# Patient Record
Sex: FEMALE | Race: WHITE | Employment: UNEMPLOYED
[De-identification: names, ages, dates, MRNs, and addresses within clinical notes are randomized per-mention and may not be internally consistent; named-entity substitution may affect disease eponyms.]

---

## 2017-01-19 ENCOUNTER — OFFICE VISIT (OUTPATIENT)
Dept: FAMILY MEDICINE CLINIC | Facility: CLINIC | Age: 27
End: 2017-01-19

## 2017-01-19 VITALS
DIASTOLIC BLOOD PRESSURE: 80 MMHG | SYSTOLIC BLOOD PRESSURE: 112 MMHG | WEIGHT: 222.6 LBS | BODY MASS INDEX: 39.44 KG/M2 | HEIGHT: 63 IN | HEART RATE: 86 BPM

## 2017-01-19 DIAGNOSIS — S46.811A TRAPEZIUS MUSCLE STRAIN, RIGHT, INITIAL ENCOUNTER: Primary | ICD-10-CM

## 2017-01-19 PROCEDURE — 99214 OFFICE O/P EST MOD 30 MIN: CPT | Performed by: FAMILY MEDICINE

## 2017-01-19 RX ORDER — MELOXICAM 15 MG/1
15 TABLET ORAL DAILY
Qty: 30 TABLET | Refills: 3 | Status: SHIPPED | OUTPATIENT
Start: 2017-01-19 | End: 2017-08-09 | Stop reason: ALTCHOICE

## 2017-01-19 RX ORDER — TIZANIDINE 4 MG/1
4 TABLET ORAL 3 TIMES DAILY
Qty: 20 TABLET | Refills: 0 | Status: SHIPPED | OUTPATIENT
Start: 2017-01-19 | End: 2017-08-09 | Stop reason: ALTCHOICE

## 2017-04-13 ENCOUNTER — OFFICE VISIT (OUTPATIENT)
Dept: FAMILY MEDICINE CLINIC | Age: 27
End: 2017-04-13
Payer: MEDICARE

## 2017-04-13 VITALS
HEART RATE: 91 BPM | OXYGEN SATURATION: 98 % | SYSTOLIC BLOOD PRESSURE: 110 MMHG | DIASTOLIC BLOOD PRESSURE: 78 MMHG | BODY MASS INDEX: 40.22 KG/M2 | RESPIRATION RATE: 14 BRPM | WEIGHT: 227 LBS | HEIGHT: 63 IN

## 2017-04-13 DIAGNOSIS — L40.9 PSORIASIS OF SCALP: Primary | ICD-10-CM

## 2017-04-13 PROCEDURE — 99213 OFFICE O/P EST LOW 20 MIN: CPT | Performed by: FAMILY MEDICINE

## 2017-04-13 RX ORDER — TRIAMCINOLONE ACETONIDE 1 MG/ML
LOTION TOPICAL
Qty: 60 ML | Refills: 3 | Status: SHIPPED | OUTPATIENT
Start: 2017-04-13 | End: 2017-04-20

## 2017-04-13 RX ORDER — TRIAMCINOLONE ACETONIDE 0.15 MG/G
AEROSOL, SPRAY TOPICAL
Qty: 100 G | Refills: 3 | Status: SHIPPED | OUTPATIENT
Start: 2017-04-13 | End: 2017-08-09 | Stop reason: ALTCHOICE

## 2017-05-03 ENCOUNTER — TELEPHONE (OUTPATIENT)
Dept: FAMILY MEDICINE CLINIC | Age: 27
End: 2017-05-03

## 2017-05-03 RX ORDER — CALCIPOTRIENE 50 UG/G
CREAM TOPICAL
Qty: 1 TUBE | Refills: 3 | Status: SHIPPED | OUTPATIENT
Start: 2017-05-03 | End: 2017-08-09 | Stop reason: ALTCHOICE

## 2017-06-01 ENCOUNTER — HOSPITAL ENCOUNTER (OUTPATIENT)
Age: 27
Setting detail: SPECIMEN
Discharge: HOME | End: 2017-06-01
Payer: MEDICARE

## 2017-06-01 ENCOUNTER — OFFICE VISIT (OUTPATIENT)
Dept: OBGYN CLINIC | Age: 27
End: 2017-06-01
Payer: MEDICARE

## 2017-06-01 VITALS
HEIGHT: 63 IN | BODY MASS INDEX: 41.11 KG/M2 | HEART RATE: 66 BPM | RESPIRATION RATE: 16 BRPM | DIASTOLIC BLOOD PRESSURE: 70 MMHG | OXYGEN SATURATION: 99 % | SYSTOLIC BLOOD PRESSURE: 100 MMHG | WEIGHT: 232 LBS

## 2017-06-01 DIAGNOSIS — N92.1 BREAKTHROUGH BLEEDING ON NEXPLANON: ICD-10-CM

## 2017-06-01 DIAGNOSIS — Z87.891 FORMER SMOKER: ICD-10-CM

## 2017-06-01 DIAGNOSIS — Z20.2 POTENTIAL EXPOSURE TO STD: ICD-10-CM

## 2017-06-01 DIAGNOSIS — Z01.419 WOMEN'S ANNUAL ROUTINE GYNECOLOGICAL EXAMINATION: Primary | ICD-10-CM

## 2017-06-01 DIAGNOSIS — Z97.5 BREAKTHROUGH BLEEDING ON NEXPLANON: ICD-10-CM

## 2017-06-01 DIAGNOSIS — Z97.5 NEXPLANON IN PLACE: ICD-10-CM

## 2017-06-01 PROCEDURE — 99395 PREV VISIT EST AGE 18-39: CPT | Performed by: NURSE PRACTITIONER

## 2017-06-01 ASSESSMENT — ENCOUNTER SYMPTOMS
RESPIRATORY NEGATIVE: 1
EYES NEGATIVE: 1
GASTROINTESTINAL NEGATIVE: 1

## 2017-06-02 LAB
C TRACH DNA GENITAL QL NAA+PROBE: NEGATIVE
CYTOLOGY REPORT: NORMAL
DIRECT EXAM: NORMAL
Lab: NORMAL
N. GONORRHOEAE DNA: NEGATIVE
SPECIMEN DESCRIPTION: NORMAL
STATUS: NORMAL

## 2017-07-25 ENCOUNTER — OFFICE VISIT (OUTPATIENT)
Dept: FAMILY MEDICINE CLINIC | Age: 27
End: 2017-07-25
Payer: MEDICARE

## 2017-07-25 VITALS
HEIGHT: 63 IN | DIASTOLIC BLOOD PRESSURE: 81 MMHG | SYSTOLIC BLOOD PRESSURE: 119 MMHG | WEIGHT: 235 LBS | OXYGEN SATURATION: 98 % | BODY MASS INDEX: 41.64 KG/M2 | RESPIRATION RATE: 12 BRPM | HEART RATE: 87 BPM

## 2017-07-25 DIAGNOSIS — Z00.01 ENCOUNTER FOR WELL ADULT EXAM WITH ABNORMAL FINDINGS: Primary | ICD-10-CM

## 2017-07-25 DIAGNOSIS — M25.50 ARTHRALGIA, UNSPECIFIED JOINT: ICD-10-CM

## 2017-07-25 DIAGNOSIS — Z13.31 POSITIVE DEPRESSION SCREENING: ICD-10-CM

## 2017-07-25 PROCEDURE — G8431 POS CLIN DEPRES SCRN F/U DOC: HCPCS | Performed by: FAMILY MEDICINE

## 2017-07-25 PROCEDURE — 99395 PREV VISIT EST AGE 18-39: CPT | Performed by: FAMILY MEDICINE

## 2017-07-25 ASSESSMENT — PATIENT HEALTH QUESTIONNAIRE - PHQ9
3. TROUBLE FALLING OR STAYING ASLEEP: 2
10. IF YOU CHECKED OFF ANY PROBLEMS, HOW DIFFICULT HAVE THESE PROBLEMS MADE IT FOR YOU TO DO YOUR WORK, TAKE CARE OF THINGS AT HOME, OR GET ALONG WITH OTHER PEOPLE: 0
2. FEELING DOWN, DEPRESSED OR HOPELESS: 3
5. POOR APPETITE OR OVEREATING: 2
8. MOVING OR SPEAKING SO SLOWLY THAT OTHER PEOPLE COULD HAVE NOTICED. OR THE OPPOSITE, BEING SO FIGETY OR RESTLESS THAT YOU HAVE BEEN MOVING AROUND A LOT MORE THAN USUAL: 1
9. THOUGHTS THAT YOU WOULD BE BETTER OFF DEAD, OR OF HURTING YOURSELF: 0
SUM OF ALL RESPONSES TO PHQ QUESTIONS 1-9: 17
6. FEELING BAD ABOUT YOURSELF - OR THAT YOU ARE A FAILURE OR HAVE LET YOURSELF OR YOUR FAMILY DOWN: 3
4. FEELING TIRED OR HAVING LITTLE ENERGY: 3
SUM OF ALL RESPONSES TO PHQ9 QUESTIONS 1 & 2: 4
1. LITTLE INTEREST OR PLEASURE IN DOING THINGS: 1
7. TROUBLE CONCENTRATING ON THINGS, SUCH AS READING THE NEWSPAPER OR WATCHING TELEVISION: 2

## 2017-07-28 LAB
AVERAGE GLUCOSE: 117
HBA1C MFR BLD: 5.7 %

## 2017-08-01 DIAGNOSIS — Z00.01 ENCOUNTER FOR WELL ADULT EXAM WITH ABNORMAL FINDINGS: ICD-10-CM

## 2017-08-01 DIAGNOSIS — M25.50 ARTHRALGIA, UNSPECIFIED JOINT: ICD-10-CM

## 2017-08-01 LAB
ALBUMIN SERPL-MCNC: NORMAL G/DL
ALP BLD-CCNC: NORMAL U/L
ALT SERPL-CCNC: NORMAL U/L
ANION GAP SERPL CALCULATED.3IONS-SCNC: NORMAL MMOL/L
AST SERPL-CCNC: NORMAL U/L
BASOPHILS ABSOLUTE: NORMAL /ΜL
BASOPHILS RELATIVE PERCENT: NORMAL %
BILIRUB SERPL-MCNC: NORMAL MG/DL (ref 0.1–1.4)
BILIRUBIN, URINE: NORMAL
BLOOD, URINE: NORMAL
BUN BLDV-MCNC: NORMAL MG/DL
CALCIUM SERPL-MCNC: NORMAL MG/DL
CHLORIDE BLD-SCNC: NORMAL MMOL/L
CLARITY: NORMAL
CO2: NORMAL MMOL/L
COLOR: NORMAL
CREAT SERPL-MCNC: NORMAL MG/DL
EOSINOPHILS ABSOLUTE: NORMAL /ΜL
EOSINOPHILS RELATIVE PERCENT: NORMAL %
GFR CALCULATED: NORMAL
GLUCOSE BLD-MCNC: NORMAL MG/DL
GLUCOSE URINE: NORMAL
HCT VFR BLD CALC: NORMAL % (ref 36–46)
HEMOGLOBIN: NORMAL G/DL (ref 12–16)
KETONES, URINE: NORMAL
LEUKOCYTE ESTERASE, URINE: NORMAL
LYMPHOCYTES ABSOLUTE: NORMAL /ΜL
LYMPHOCYTES RELATIVE PERCENT: NORMAL %
MCH RBC QN AUTO: NORMAL PG
MCHC RBC AUTO-ENTMCNC: NORMAL G/DL
MCV RBC AUTO: NORMAL FL
MONOCYTES ABSOLUTE: NORMAL /ΜL
MONOCYTES RELATIVE PERCENT: NORMAL %
NEUTROPHILS ABSOLUTE: NORMAL /ΜL
NEUTROPHILS RELATIVE PERCENT: NORMAL %
NITRITE, URINE: NORMAL
PDW BLD-RTO: NORMAL %
PH UA: NORMAL (ref 4.5–8)
PLATELET # BLD: NORMAL K/ΜL
PMV BLD AUTO: NORMAL FL
POTASSIUM SERPL-SCNC: NORMAL MMOL/L
PROTEIN UA: NORMAL
RBC # BLD: NORMAL 10^6/ΜL
RHEUMATOID FACTOR: NORMAL
SODIUM BLD-SCNC: NORMAL MMOL/L
SPECIFIC GRAVITY, URINE: NORMAL
T4 FREE: NORMAL
TOTAL PROTEIN: NORMAL
TSH SERPL DL<=0.05 MIU/L-ACNC: NORMAL UIU/ML
UROBILINOGEN, URINE: NORMAL
WBC # BLD: NORMAL 10^3/ML

## 2017-08-09 ENCOUNTER — OFFICE VISIT (OUTPATIENT)
Dept: FAMILY MEDICINE CLINIC | Age: 27
End: 2017-08-09
Payer: MEDICARE

## 2017-08-09 VITALS
OXYGEN SATURATION: 95 % | HEIGHT: 63 IN | WEIGHT: 236 LBS | BODY MASS INDEX: 41.82 KG/M2 | SYSTOLIC BLOOD PRESSURE: 106 MMHG | DIASTOLIC BLOOD PRESSURE: 76 MMHG | HEART RATE: 99 BPM

## 2017-08-09 DIAGNOSIS — R73.03 PREDIABETES: Primary | ICD-10-CM

## 2017-08-09 DIAGNOSIS — R63.5 WEIGHT GAIN FINDING: ICD-10-CM

## 2017-08-09 PROCEDURE — 99213 OFFICE O/P EST LOW 20 MIN: CPT | Performed by: FAMILY MEDICINE

## 2017-10-13 ENCOUNTER — OFFICE VISIT (OUTPATIENT)
Dept: FAMILY MEDICINE CLINIC | Age: 27
End: 2017-10-13
Payer: MEDICARE

## 2017-10-13 VITALS
HEART RATE: 83 BPM | RESPIRATION RATE: 12 BRPM | WEIGHT: 234 LBS | SYSTOLIC BLOOD PRESSURE: 106 MMHG | DIASTOLIC BLOOD PRESSURE: 82 MMHG | BODY MASS INDEX: 41.46 KG/M2 | OXYGEN SATURATION: 96 %

## 2017-10-13 DIAGNOSIS — G89.29 CHRONIC MIDLINE LOW BACK PAIN WITHOUT SCIATICA: Primary | ICD-10-CM

## 2017-10-13 DIAGNOSIS — M54.50 CHRONIC MIDLINE LOW BACK PAIN WITHOUT SCIATICA: Primary | ICD-10-CM

## 2017-10-13 DIAGNOSIS — H54.7 VISION PROBLEMS: ICD-10-CM

## 2017-10-13 PROCEDURE — 99213 OFFICE O/P EST LOW 20 MIN: CPT | Performed by: FAMILY MEDICINE

## 2017-10-13 NOTE — PROGRESS NOTES
Security Number ([id number]) and Date of Birth (mm/dd/yyyy) as indicated and click Submit. You will be taken to the next sign-up page. 5. Create a [employer] ID. This will be your [employer] login ID and cannot be changed, so think of one that is secure and easy to remember. 6. Create a [employer] password. You can change your password at any time. 7. Enter your Password Reset Question and Answer. This can be used at a later time if you forget your password. 8. Enter your e-mail address. You will receive e-mail notification when new information is available in [address] 9. Click Sign Up. You can now view your medical record. Additional Information  If you have questions, please contact your physician practice where you receive care. Remember, [employer] is NOT to be used for urgent needs. For medical emergencies, dial 911.

## 2017-10-13 NOTE — PROGRESS NOTES
with sitting to long or standing. Associated signs/symptoms: no other sx. Patient also wants to know if she can have a note from me to tell the 's proved that she can have a 's license. She tells me that she had meningitis when she was 16 years old. 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 She did not follow up with any neurologist.  She cannot tell me what her that she cannot make the 's license. She tells me that she has episodes were today. She just stare. Then she does not recognize anybody anything. She cannot tell me how long the stares are. He denies any other symptoms including vision changes, sensation loss, muscle weakness, headaches, speach problems    Review of Systems   Constitutional: Negative for fever and unexpected weight change. Respiratory: Negative for cough and shortness of breath. Cardiovascular: Negative for chest pain and leg swelling. Neurological: Negative for dizziness and headaches. positive for stares. Psychiatric/Behavioral: Negative for confusion and agitation. Objective:   Physical Exam  Constitutional: VS (see above). General appearance: normal development, habitus and attention, no deformities. Eyes: normal conjunctiva and lids. CAV: RRR, no RMG. No edema lower extremities. Pulmo: CTA bilateral, no CWR. Skin: no rashes, lesions or ulcers. Musculoskeletal: normal gait. Nails: no clubbing or cyanosis. Psychiatric: alert and oriented to place, time and person. Normal mood and affect. Assessment:      1. Chronic midline low back pain without sciatica  [facility]   2. Vision problems  [facility] Avril Suresh MD       Plan:   Patient's physical therapy already. I will send to pain management. It seems that her for muscle strength is diabetes. She has always pain.   I will send the patient to neurologist if she really has a fixed gaze and maybe she has even seizures. Await urologist's recommendation. Call or return to clinic prn if these symptoms worsen or fail to improve as anticipated. I have reviewed the instructions with the patient, answering all questions to her satisfaction. Return in about 6 months (around 4/13/2018), or if symptoms worsen or fail to improve. Orders Placed This Encounter   Procedures    [facility] [address] [facility]     Referral Priority:   Routine     Referral Type:   Consult for Advice and Opinion     Referral Reason:   Specialty Services Required     Number of Visits Requested:   1    [facility] Sonia Benson MD     Referral Priority:   Routine     Referral Type:   Consult for Advice and Opinion     Referral Reason:   Specialty Services Required     Referred to Provider:   Odessa Sanchez MD     Requested Specialty:   Neurology     Number of Visits Requested:   1     No orders of the defined types were placed in this encounter.       Electronically signed by Patricia Lara MD on 10/13/2017 at 2:01 PM       (Please note that portions of this note were completed with a voice recognition program. Efforts were made to edit the dictations but occasionally words are mis-transcribed.)

## 2017-10-19 ENCOUNTER — HOSPITAL ENCOUNTER (OUTPATIENT)
Dept: PAIN MANAGEMENT | Age: 27
Discharge: HOME | End: 2017-10-19
Payer: MEDICARE

## 2017-10-19 VITALS
BODY MASS INDEX: 41.46 KG/M2 | SYSTOLIC BLOOD PRESSURE: 107 MMHG | HEIGHT: 63 IN | DIASTOLIC BLOOD PRESSURE: 62 MMHG | WEIGHT: 234 LBS | TEMPERATURE: 97.9 F | RESPIRATION RATE: 18 BRPM

## 2017-10-19 DIAGNOSIS — G89.29 CHRONIC GENERALIZED PAIN: Primary | ICD-10-CM

## 2017-10-19 DIAGNOSIS — R52 CHRONIC GENERALIZED PAIN: Primary | ICD-10-CM

## 2017-10-19 PROCEDURE — 99204 OFFICE O/P NEW MOD 45 MIN: CPT

## 2017-10-19 PROCEDURE — 99244 OFF/OP CNSLTJ NEW/EST MOD 40: CPT | Performed by: PAIN MEDICINE

## 2017-10-19 RX ORDER — GABAPENTIN 300 MG/1
300 CAPSULE ORAL 3 TIMES DAILY
Qty: 90 CAPSULE | Refills: 0 | Status: SHIPPED | OUTPATIENT
Start: 2017-10-19 | End: 2017-12-14 | Stop reason: SDUPTHER

## 2017-10-19 ASSESSMENT — ENCOUNTER SYMPTOMS
BACK PAIN: 1
EYE DISCHARGE: 0
SUSPICIOUS LESIONS: 0
BOWEL INCONTINENCE: 0
UNUSUAL HAIR DISTRIBUTION: 0
STRIDOR: 0
SPUTUM PRODUCTION: 0
HEMOPTYSIS: 0
JAUNDICE: 0

## 2017-12-14 ENCOUNTER — HOSPITAL ENCOUNTER (OUTPATIENT)
Dept: PAIN MANAGEMENT | Age: 27
Discharge: HOME | End: 2017-12-14
Payer: MEDICARE

## 2017-12-14 VITALS
TEMPERATURE: 98.4 F | DIASTOLIC BLOOD PRESSURE: 74 MMHG | HEIGHT: 63 IN | HEART RATE: 105 BPM | SYSTOLIC BLOOD PRESSURE: 125 MMHG | WEIGHT: 234 LBS | RESPIRATION RATE: 16 BRPM | BODY MASS INDEX: 41.46 KG/M2

## 2017-12-14 DIAGNOSIS — G89.29 CHRONIC MIDLINE LOW BACK PAIN WITHOUT SCIATICA: Primary | ICD-10-CM

## 2017-12-14 DIAGNOSIS — M54.50 CHRONIC MIDLINE LOW BACK PAIN WITHOUT SCIATICA: Primary | ICD-10-CM

## 2017-12-14 DIAGNOSIS — R52 CHRONIC GENERALIZED PAIN: ICD-10-CM

## 2017-12-14 DIAGNOSIS — G89.29 CHRONIC UPPER BACK PAIN: ICD-10-CM

## 2017-12-14 DIAGNOSIS — M54.9 CHRONIC UPPER BACK PAIN: ICD-10-CM

## 2017-12-14 DIAGNOSIS — G89.29 CHRONIC GENERALIZED PAIN: ICD-10-CM

## 2017-12-14 PROCEDURE — 99214 OFFICE O/P EST MOD 30 MIN: CPT

## 2017-12-14 PROCEDURE — 99214 OFFICE O/P EST MOD 30 MIN: CPT | Performed by: PAIN MEDICINE

## 2017-12-14 RX ORDER — GABAPENTIN 300 MG/1
300 CAPSULE ORAL 3 TIMES DAILY
Qty: 90 CAPSULE | Refills: 0 | Status: SHIPPED | OUTPATIENT
Start: 2017-12-14 | End: 2018-02-28 | Stop reason: SINTOL

## 2017-12-14 ASSESSMENT — ENCOUNTER SYMPTOMS
BACK PAIN: 1
BLURRED VISION: 0

## 2017-12-14 ASSESSMENT — PAIN SCALES - GENERAL: PAINLEVEL_OUTOF10: 0

## 2018-01-19 ENCOUNTER — HOSPITAL ENCOUNTER (OUTPATIENT)
Dept: PAIN MANAGEMENT | Age: 28
Discharge: HOME | End: 2018-01-19
Payer: MEDICARE

## 2018-01-19 VITALS
SYSTOLIC BLOOD PRESSURE: 117 MMHG | DIASTOLIC BLOOD PRESSURE: 70 MMHG | HEART RATE: 87 BPM | TEMPERATURE: 98.4 F | RESPIRATION RATE: 16 BRPM

## 2018-01-19 DIAGNOSIS — M54.50 CHRONIC MIDLINE LOW BACK PAIN WITHOUT SCIATICA: Chronic | ICD-10-CM

## 2018-01-19 DIAGNOSIS — G89.29 CHRONIC MIDLINE LOW BACK PAIN WITHOUT SCIATICA: Chronic | ICD-10-CM

## 2018-01-19 PROBLEM — M54.9 BACK PAIN, CHRONIC: Chronic | Status: ACTIVE | Noted: 2018-01-19

## 2018-01-19 PROCEDURE — 99214 OFFICE O/P EST MOD 30 MIN: CPT

## 2018-01-19 PROCEDURE — 99213 OFFICE O/P EST LOW 20 MIN: CPT | Performed by: NURSE PRACTITIONER

## 2018-01-19 ASSESSMENT — ENCOUNTER SYMPTOMS
BACK PAIN: 1
COUGH: 0
CONSTIPATION: 0
SHORTNESS OF BREATH: 0
BOWEL INCONTINENCE: 0

## 2018-01-19 NOTE — PROGRESS NOTES
disturbances in coordination, loss of balance, numbness and tingling. Physical Exam:  /70   Pulse 87   Temp 98.4 °F (36.9 °C) (Oral)   Resp 16     Physical Exam   Constitutional: She is oriented to person, place, and time and well-developed, well-nourished, and in no distress. HENT:   Head: Normocephalic. Eyes: EOM are normal.   Neck: Normal range of motion. Pulmonary/Chest: Effort normal.   Musculoskeletal: Normal range of motion. Neurological: She is alert and oriented to person, place, and time. Skin: Skin is warm and dry. Psychiatric: Affect normal.       Record/Diagnostics Review:    As above, I did review the imaging    Assessment:  Problem List Items Addressed This Visit     Back pain, chronic (Chronic)      Other Visit Diagnoses    None. Treatment Plan:  DISCUSSION: Treatment options discussed with patient and all questions answered to patient's satisfaction.       TREATMENT OPTIONS:   Patient has failed conservative treatment for back pain  She is unable to localize pain to one are in the back  Follow up with PCP for eval for fibromyalgia

## 2018-01-31 ENCOUNTER — OFFICE VISIT (OUTPATIENT)
Dept: FAMILY MEDICINE CLINIC | Age: 28
End: 2018-01-31
Payer: MEDICARE

## 2018-01-31 VITALS
HEIGHT: 63 IN | DIASTOLIC BLOOD PRESSURE: 79 MMHG | BODY MASS INDEX: 41.11 KG/M2 | HEART RATE: 72 BPM | WEIGHT: 232 LBS | RESPIRATION RATE: 16 BRPM | SYSTOLIC BLOOD PRESSURE: 108 MMHG | OXYGEN SATURATION: 99 %

## 2018-01-31 DIAGNOSIS — M54.9 OTHER CHRONIC BACK PAIN: Primary | ICD-10-CM

## 2018-01-31 DIAGNOSIS — G89.29 OTHER CHRONIC BACK PAIN: Primary | ICD-10-CM

## 2018-01-31 PROCEDURE — G8484 FLU IMMUNIZE NO ADMIN: HCPCS | Performed by: FAMILY MEDICINE

## 2018-01-31 PROCEDURE — 99213 OFFICE O/P EST LOW 20 MIN: CPT | Performed by: FAMILY MEDICINE

## 2018-01-31 PROCEDURE — G8417 CALC BMI ABV UP PARAM F/U: HCPCS | Performed by: FAMILY MEDICINE

## 2018-01-31 PROCEDURE — G8427 DOCREV CUR MEDS BY ELIG CLIN: HCPCS | Performed by: FAMILY MEDICINE

## 2018-01-31 PROCEDURE — 1036F TOBACCO NON-USER: CPT | Performed by: FAMILY MEDICINE

## 2018-01-31 RX ORDER — DULOXETIN HYDROCHLORIDE 30 MG/1
30 CAPSULE, DELAYED RELEASE ORAL DAILY
Qty: 30 CAPSULE | Refills: 3 | Status: SHIPPED | OUTPATIENT
Start: 2018-01-31 | End: 2018-02-19 | Stop reason: SDUPTHER

## 2018-01-31 NOTE — PROGRESS NOTES
Routine     Referral Type:   Consult for Advice and Opinion     Referral Reason:   Specialty Services Required     Referred to Provider:   Nazia Cuellar MD     Requested Specialty:   Physical Medicine and Rehab     Number of Visits Requested:   1     Orders Placed This Encounter   Medications    DULoxetine (CYMBALTA) 30 MG extended release capsule     Sig: Take 1 capsule by mouth daily     Dispense:  30 capsule     Refill:  3       Electronically signed by Kevan Valdez MD on 1/31/2018 at 11:59 AM       (Please note that portions of this note were completed with a voice recognition program. Efforts were made to edit the dictations but occasionally words are mis-transcribed.)

## 2018-02-11 DIAGNOSIS — R51.9 INTRACTABLE HEADACHE, UNSPECIFIED CHRONICITY PATTERN, UNSPECIFIED HEADACHE TYPE: ICD-10-CM

## 2018-02-12 RX ORDER — SUMATRIPTAN 50 MG/1
TABLET, FILM COATED ORAL
Qty: 9 TABLET | Refills: 3 | Status: SHIPPED | OUTPATIENT
Start: 2018-02-12 | End: 2018-04-17

## 2018-02-19 ENCOUNTER — TELEPHONE (OUTPATIENT)
Dept: FAMILY MEDICINE CLINIC | Age: 28
End: 2018-02-19

## 2018-02-19 DIAGNOSIS — G89.29 OTHER CHRONIC BACK PAIN: ICD-10-CM

## 2018-02-19 DIAGNOSIS — M54.9 OTHER CHRONIC BACK PAIN: ICD-10-CM

## 2018-02-19 RX ORDER — DULOXETIN HYDROCHLORIDE 30 MG/1
30 CAPSULE, DELAYED RELEASE ORAL 2 TIMES DAILY
Qty: 60 CAPSULE | Refills: 3 | Status: SHIPPED | OUTPATIENT
Start: 2018-02-19 | End: 2018-04-17

## 2018-02-19 RX ORDER — DULOXETIN HYDROCHLORIDE 30 MG/1
30 CAPSULE, DELAYED RELEASE ORAL 2 TIMES DAILY
Qty: 30 CAPSULE | Refills: 3 | Status: SHIPPED | OUTPATIENT
Start: 2018-02-19 | End: 2018-02-19 | Stop reason: SDUPTHER

## 2018-02-28 ENCOUNTER — INITIAL CONSULT (OUTPATIENT)
Dept: PHYSICAL MEDICINE AND REHAB | Age: 28
End: 2018-02-28
Payer: MEDICARE

## 2018-02-28 VITALS
HEIGHT: 63 IN | WEIGHT: 229 LBS | HEART RATE: 68 BPM | SYSTOLIC BLOOD PRESSURE: 117 MMHG | BODY MASS INDEX: 40.57 KG/M2 | TEMPERATURE: 98.6 F | DIASTOLIC BLOOD PRESSURE: 70 MMHG

## 2018-02-28 DIAGNOSIS — M70.61 TROCHANTERIC BURSITIS OF RIGHT HIP: ICD-10-CM

## 2018-02-28 DIAGNOSIS — G89.29 CHRONIC BILATERAL LOW BACK PAIN WITHOUT SCIATICA: Primary | Chronic | ICD-10-CM

## 2018-02-28 DIAGNOSIS — R52 DIFFUSE PAIN: ICD-10-CM

## 2018-02-28 DIAGNOSIS — M54.50 CHRONIC BILATERAL LOW BACK PAIN WITHOUT SCIATICA: Primary | Chronic | ICD-10-CM

## 2018-02-28 PROCEDURE — G8484 FLU IMMUNIZE NO ADMIN: HCPCS | Performed by: PHYSICAL MEDICINE & REHABILITATION

## 2018-02-28 PROCEDURE — G8417 CALC BMI ABV UP PARAM F/U: HCPCS | Performed by: PHYSICAL MEDICINE & REHABILITATION

## 2018-02-28 PROCEDURE — G8427 DOCREV CUR MEDS BY ELIG CLIN: HCPCS | Performed by: PHYSICAL MEDICINE & REHABILITATION

## 2018-02-28 PROCEDURE — 99244 OFF/OP CNSLTJ NEW/EST MOD 40: CPT | Performed by: PHYSICAL MEDICINE & REHABILITATION

## 2018-02-28 RX ORDER — NAPROXEN 500 MG/1
500 TABLET ORAL 2 TIMES DAILY WITH MEALS
Qty: 60 TABLET | Refills: 1 | Status: SHIPPED | OUTPATIENT
Start: 2018-02-28 | End: 2018-04-17

## 2018-02-28 ASSESSMENT — ENCOUNTER SYMPTOMS
BACK PAIN: 1
BOWEL INCONTINENCE: 0

## 2018-02-28 NOTE — PROGRESS NOTES
[facility] PHYSICIANS  [facility] PHYSICAL MEDICINE AND REHABILITATION  [address] [id number]  Dept: [phone]  Dept Fax: [phone]    New Patient Consultation Note    Kennedy Padron, 24 y.o., female, is c/o of Back Pain (Consult: Chronic Back Pain per Dr. Patrick Márquez)   and request for evaluation of back pain by Temo Islas MD.    HPI:     Back Pain   This is a chronic (7 years ) problem. The current episode started more than 1 year ago. The problem occurs intermittently. The problem is unchanged (for the last 6-7 months). The pain is present in the lumbar spine (right side only). The quality of the pain is described as shooting and stabbing. Radiates to: can radiate to either foot at times. Can radiate to either elbow or wrist. The pain is at a severity of 4/10. The pain is mild. The pain is the same all the time. The symptoms are aggravated by bending, lying down, twisting and standing. Stiffness is present: sometimes she is stiff could be any time of day. ~2 x/ month. Pertinent negatives include no bladder incontinence, bowel incontinence, numbness, tingling or weakness. She has tried ice, heat and NSAIDs for the symptoms. The treatment provided no relief. The patient states that her pain varies. For the last 6-7 months she has had mid to low back pain everyday. She only has pain in the peripherary if she sits or stands for too long. This lasts for 1-2 hours.       Past Medical History:   Diagnosis Date    ADHD     Anxiety     Brain injury ([address]     Depression     Hearing loss     Learning disability     Migraine headache       Past Surgical History:   Procedure Laterality Date    TONSILLECTOMY      WISDOM TOOTH EXTRACTION       Family History   Problem Relation Age of Onset    Diabetes Mother     Heart Disease Father     Diabetes Father     Diabetes Maternal Grandmother     Diabetes Maternal Grandfather     Diabetes Paternal Grandmother     Diabetes Paternal Grandfather     Other Sister      sepsis in blood stream and pneumonia    Alcohol Abuse Brother     Drug Abuse Brother     Alcohol Abuse Brother     Drug Abuse Brother      Social History   Substance Use Topics    Smoking status: Former Smoker     Quit date: 1/1/2016    Smokeless tobacco: Never Used    Alcohol use No      Occupation: does not work    Current Outpatient Prescriptions   Medication Sig Dispense Refill    naproxen (NAPROSYN) 500 MG tablet Take 1 tablet by mouth 2 times daily (with meals) 60 tablet 1    DULoxetine (CYMBALTA) 30 MG extended release capsule Take 1 capsule by mouth 2 times daily 60 capsule 3    amitriptyline (ELAVIL) 50 MG tablet Take 50 mg by mouth nightly       etonogestrel (NEXPLANON) 68 MG implant Inject 68 mg into the skin once Indications: old one removed and new one inserted 8/14/15      SUMAtriptan (IMITREX) 50 MG tablet TAKE ONE TABLET BY MOUTH ONCE AS NEEDED FOR MIGRAINE 9 tablet 3     No current facility-administered medications for this visit. Allergies   Allergen Reactions    Cat Hair Extract     Dust Mite Extract     Molds & Smuts        Subjective:      Review of Systems   Gastrointestinal: Negative for bowel incontinence. Genitourinary: Negative for bladder incontinence. Musculoskeletal: Positive for back pain. Neurological: Negative for tingling, weakness and numbness. Constitutional: Negative for fever, chills and unexpected weight change. HENT: Negative for trouble swallowing. Respiratory: Negative for cough and shortness of breath. Cardiovascular: Negative for chest pain. Endocrine: Negative for polyuria. Genitourinary: Negative for dysuria, urgency, frequency and difficulty urinating. Musculoskeletal: Negative for back pain and arthralgias. Neurological: Negative for headaches. Objective:     Physical Exam   Constitutional: She appears well-developed. Neck: Normal range of motion.    Musculoskeletal:        Cervical measuring 2.7 cm in maximum diameter. This likely represents a dominant follicle. Assessment:      1. Chronic bilateral low back pain without sciatica  [facility] Antonio Lemos MD   2. Trochanteric bursitis of right hip  naproxen (NAPROSYN) 500 MG tablet   3. Diffuse pain  [facility] Antonio Lemos MD          Plan:      1. The patient is here today with diffuse pain in the back. She reports a history of diffuse pain in the extremities off and on for years. The patient had an essentially normal exam today. She reports that she just finished physical therapy in December 2017 with minimal improvement. 2.  The patient was started on treatment for fibromyalgia and is currently taking cymbalta with fair results per patient report. He reports some fatigue , but does not feel pain on both sides of the body or above and below the waist currently. The patient had only 4-5 of 18 painful points on exam (old criteria and inconsistent exam). I will recommend evaluation and treatment by a rheumatologist for fibromyalgia.  3 Greater trochanteric bursitis- we'll start Naproxen BID for 1 month and provided the patient with a HEP. Orders Placed This Encounter   Procedures    [facility] Antonio Lemos MD     Referral Priority:   Routine     Referral Type:   Consult for Advice and Opinion     Referral Reason:   Specialty Services Required     Referred to Provider:   Roney Rockwell MD     Requested Specialty:   Rheumatology     Number of Visits Requested:   1     Orders Placed This Encounter   Medications    naproxen (NAPROSYN) 500 MG tablet     Sig: Take 1 tablet by mouth 2 times daily (with meals)     Dispense:  60 tablet     Refill:  1     Return in about 1 month (around 3/28/2018) for reevaluate hip pain.        Electronically signed by Eve Ibarra MD on 2/28/2018 at 12:18 PM.     Please note that this chart was

## 2018-03-12 DIAGNOSIS — G89.29 OTHER CHRONIC BACK PAIN: ICD-10-CM

## 2018-03-12 DIAGNOSIS — M54.9 OTHER CHRONIC BACK PAIN: ICD-10-CM

## 2018-03-12 RX ORDER — DULOXETIN HYDROCHLORIDE 30 MG/1
30 CAPSULE, DELAYED RELEASE ORAL 2 TIMES DAILY
Qty: 60 CAPSULE | Refills: 3 | Status: CANCELLED | OUTPATIENT
Start: 2018-03-12

## 2018-03-27 ENCOUNTER — TELEPHONE (OUTPATIENT)
Dept: FAMILY MEDICINE CLINIC | Age: 28
End: 2018-03-27

## 2018-04-06 ENCOUNTER — CLINICAL DOCUMENTATION (OUTPATIENT)
Dept: PHYSICAL MEDICINE AND REHAB | Age: 28
End: 2018-04-06

## 2018-04-17 ENCOUNTER — OFFICE VISIT (OUTPATIENT)
Dept: PHYSICAL MEDICINE AND REHAB | Age: 28
End: 2018-04-17
Payer: MEDICARE

## 2018-04-17 VITALS
WEIGHT: 239.2 LBS | DIASTOLIC BLOOD PRESSURE: 72 MMHG | BODY MASS INDEX: 44.02 KG/M2 | TEMPERATURE: 99 F | SYSTOLIC BLOOD PRESSURE: 102 MMHG | HEART RATE: 93 BPM | HEIGHT: 62 IN

## 2018-04-17 DIAGNOSIS — M54.50 CHRONIC BILATERAL LOW BACK PAIN WITHOUT SCIATICA: Primary | Chronic | ICD-10-CM

## 2018-04-17 DIAGNOSIS — G89.29 CHRONIC BILATERAL LOW BACK PAIN WITHOUT SCIATICA: Primary | Chronic | ICD-10-CM

## 2018-04-17 PROCEDURE — 1036F TOBACCO NON-USER: CPT | Performed by: PHYSICAL MEDICINE & REHABILITATION

## 2018-04-17 PROCEDURE — G8427 DOCREV CUR MEDS BY ELIG CLIN: HCPCS | Performed by: PHYSICAL MEDICINE & REHABILITATION

## 2018-04-17 PROCEDURE — G8417 CALC BMI ABV UP PARAM F/U: HCPCS | Performed by: PHYSICAL MEDICINE & REHABILITATION

## 2018-04-17 PROCEDURE — 99214 OFFICE O/P EST MOD 30 MIN: CPT | Performed by: PHYSICAL MEDICINE & REHABILITATION

## 2018-04-19 ENCOUNTER — TELEPHONE (OUTPATIENT)
Dept: PHYSICAL MEDICINE AND REHAB | Age: 28
End: 2018-04-19

## 2018-04-19 DIAGNOSIS — M54.50 CHRONIC BILATERAL LOW BACK PAIN WITHOUT SCIATICA: Primary | Chronic | ICD-10-CM

## 2018-04-19 DIAGNOSIS — G89.29 CHRONIC BILATERAL LOW BACK PAIN WITHOUT SCIATICA: Primary | Chronic | ICD-10-CM

## 2018-08-14 ENCOUNTER — PROCEDURE VISIT (OUTPATIENT)
Dept: OBGYN CLINIC | Age: 28
End: 2018-08-14
Payer: MEDICARE

## 2018-08-14 VITALS
SYSTOLIC BLOOD PRESSURE: 110 MMHG | DIASTOLIC BLOOD PRESSURE: 76 MMHG | HEIGHT: 62 IN | WEIGHT: 233 LBS | BODY MASS INDEX: 42.88 KG/M2

## 2018-08-14 DIAGNOSIS — Z30.46 ENCOUNTER FOR NEXPLANON REMOVAL: Primary | ICD-10-CM

## 2018-08-14 DIAGNOSIS — Z30.013 ENCOUNTER FOR INITIAL PRESCRIPTION OF INJECTABLE CONTRACEPTIVE: ICD-10-CM

## 2018-08-14 DIAGNOSIS — N92.6 IRREGULAR BLEEDING: ICD-10-CM

## 2018-08-14 PROCEDURE — 96372 THER/PROPH/DIAG INJ SC/IM: CPT | Performed by: ADVANCED PRACTICE MIDWIFE

## 2018-08-14 PROCEDURE — 11982 REMOVE DRUG IMPLANT DEVICE: CPT | Performed by: ADVANCED PRACTICE MIDWIFE

## 2018-08-14 RX ORDER — MEDROXYPROGESTERONE ACETATE 150 MG/ML
150 INJECTION, SUSPENSION INTRAMUSCULAR ONCE
Qty: 1 ML | Refills: 3 | Status: SHIPPED | OUTPATIENT
Start: 2018-08-14 | End: 2019-06-17 | Stop reason: SDUPTHER

## 2018-08-15 ENCOUNTER — NURSE ONLY (OUTPATIENT)
Dept: OBGYN CLINIC | Age: 28
End: 2018-08-15
Payer: MEDICARE

## 2018-08-15 DIAGNOSIS — Z30.013 ENCOUNTER FOR INITIAL PRESCRIPTION OF INJECTABLE CONTRACEPTIVE: Primary | ICD-10-CM

## 2018-08-15 PROBLEM — Z30.46 ENCOUNTER FOR NEXPLANON REMOVAL: Status: ACTIVE | Noted: 2018-08-15

## 2018-08-15 PROCEDURE — 96372 THER/PROPH/DIAG INJ SC/IM: CPT | Performed by: ADVANCED PRACTICE MIDWIFE

## 2018-08-15 RX ORDER — MEDROXYPROGESTERONE ACETATE 150 MG/ML
150 INJECTION, SUSPENSION INTRAMUSCULAR ONCE
Status: COMPLETED | OUTPATIENT
Start: 2018-08-15 | End: 2018-08-15

## 2018-08-15 RX ORDER — NORTRIPTYLINE HYDROCHLORIDE 25 MG/1
CAPSULE ORAL
COMMUNITY
Start: 2018-08-01 | End: 2019-05-24 | Stop reason: ALTCHOICE

## 2018-08-15 RX ADMIN — MEDROXYPROGESTERONE ACETATE 150 MG: 150 INJECTION, SUSPENSION INTRAMUSCULAR at 10:50

## 2018-08-15 NOTE — PROGRESS NOTES
Maira Villalba  1990    CC: Here for removal of Nexplanon after 3 years. States has gained a lot of weight since placement and Nexplanon is very deep. Difficulty palpating device, but eventually located and marked. Wanting to start DMPA. Pt consents to proceed and consent form has been signed. PROCEDURE:  Nexplanon Removal  Left upper inner arm cleansed with Betadine swabsticks. 8 cc of 1% Lidocaine with Epinephrine injected  Incision approximately 1 inch was made proximal to device. Device removed after some difficulty locating it. Device was confirmed intact. 3 interrupted sutures placed. Band aid was placed over removal site and arm was pressure wrapped. Patient tolerated procedure well. Bleeding, infection and pain precautions discussed.   RTO in 2 week for Nurse Visit - Suture removal

## 2018-08-21 ENCOUNTER — TELEPHONE (OUTPATIENT)
Dept: OBGYN CLINIC | Age: 28
End: 2018-08-21

## 2018-08-21 NOTE — TELEPHONE ENCOUNTER
Patient called today and states she would like to take out her own sutures. She states the wound is healed and she does not want to come in for a 5 minute appointment to have them taken out. I told her we do not recommend taking out the sutures at home. I stated we would like to see the area and remove them at the office. I offered her an appointment today and she declined and said she would call back.

## 2018-08-24 ENCOUNTER — OFFICE VISIT (OUTPATIENT)
Dept: OBGYN CLINIC | Age: 28
End: 2018-08-24
Payer: MEDICARE

## 2018-08-24 ENCOUNTER — HOSPITAL ENCOUNTER (OUTPATIENT)
Age: 28
Setting detail: SPECIMEN
Discharge: HOME | End: 2018-08-24
Payer: MEDICARE

## 2018-08-24 VITALS
SYSTOLIC BLOOD PRESSURE: 104 MMHG | HEART RATE: 68 BPM | BODY MASS INDEX: 42.88 KG/M2 | DIASTOLIC BLOOD PRESSURE: 78 MMHG | HEIGHT: 62 IN | WEIGHT: 233 LBS

## 2018-08-24 DIAGNOSIS — Z01.419 ENCOUNTER FOR WELL WOMAN EXAM: ICD-10-CM

## 2018-08-24 PROCEDURE — 99395 PREV VISIT EST AGE 18-39: CPT | Performed by: ADVANCED PRACTICE MIDWIFE

## 2018-08-24 NOTE — PROGRESS NOTES
adenopathy    Physical Exam:     Constitutional:   Blood pressure 104/78, pulse 68, height 5' 2\" (1.575 m), weight 233 lb (105.7 kg), last menstrual period 08/15/2018, not currently breastfeeding. General Appearance: This is a well-developed, well-nourished and well-groomed female    Skin:  Inspection of the skin revealed no rashes or lesions. Incision from Nexplanon removal is C/D/I. Neck and EENT:  The neck was supple with full range of motion and no masses. The thyroid was not enlarged and had no masses. Normal external ears are present  Nares are patent    Respiratory: The lungs were clear to auscultation bilaterally. There were no rales, rhonchi or wheezes. There was good respiratory effort. Cardiovascular: The heart was in a regular rhythm and rate was normal.  No murmur or extra sounds were noted. Breast:  The breasts are normal size and symmetrical.  There are no skin changes with position changes. The nipples are without deviations or discharge. No masses were palpated. No axillary lymphadenopathy is present. Back:  Straight with no CVA tenderness present    Abdomen: The abdomen is soft and non-tender. There was no guarding, rebound or rigidity. The bladder was without fullness or tenderness. No hernias were appreciated. Pelvic: The external genitalia has a normal appearance without masses or lesions. BUS is normal.  The vagina is pink and well rugated. The cervix is without lesions with no CMT. Pap was obtained without difficulty. The uterus is normal size, shape and consistency. The adnexa are without masses or tenderness. Rectum is normal.    Psychiatric:  Alert, oriented to time, place, person and situation. There are no mood or affect changes.     Assesment:     Routine annual gynecological exam  Patient Active Problem List   Diagnosis    Irregular bleeding    Back pain, chronic    Chronic bilateral low back pain without sciatica    Encounter for Nexplanon removal    Encounter for initial prescription of injectable contraceptive    Encounter for well woman exam     Plan:   1. Return for annual exam.  Pap per current recommendations. 2.  Mammogram: No.  SBE was reinforced  3. DEXA scan:  No  4. Colonscopy: No  5. Routine health maintenance: With PCP  6. Hereditary Breast, Ovarian, Colon and Uterine Cancer screening: not done    Patient was seen with total face to face time of 25 minutes. More than 50% of this visit was counseling and education regarding    Diagnosis Orders   1.  Encounter for well woman exam

## 2018-09-12 LAB — CYTOLOGY REPORT: NORMAL

## 2018-09-23 PROBLEM — Z01.419 ENCOUNTER FOR WELL WOMAN EXAM: Status: RESOLVED | Noted: 2018-08-24 | Resolved: 2018-09-23

## 2018-10-08 ENCOUNTER — OFFICE VISIT (OUTPATIENT)
Dept: OBGYN CLINIC | Age: 28
End: 2018-10-08
Payer: MEDICARE

## 2018-10-08 VITALS
DIASTOLIC BLOOD PRESSURE: 82 MMHG | SYSTOLIC BLOOD PRESSURE: 104 MMHG | BODY MASS INDEX: 42.88 KG/M2 | WEIGHT: 233 LBS | HEIGHT: 62 IN | HEART RATE: 66 BPM

## 2018-10-08 DIAGNOSIS — K64.4 CUTANEOUS TAG, HEMORRHOIDAL: Primary | ICD-10-CM

## 2018-10-08 PROCEDURE — G8417 CALC BMI ABV UP PARAM F/U: HCPCS | Performed by: ADVANCED PRACTICE MIDWIFE

## 2018-10-08 PROCEDURE — 99213 OFFICE O/P EST LOW 20 MIN: CPT | Performed by: ADVANCED PRACTICE MIDWIFE

## 2018-10-08 PROCEDURE — 1036F TOBACCO NON-USER: CPT | Performed by: ADVANCED PRACTICE MIDWIFE

## 2018-10-08 PROCEDURE — G8427 DOCREV CUR MEDS BY ELIG CLIN: HCPCS | Performed by: ADVANCED PRACTICE MIDWIFE

## 2018-10-08 PROCEDURE — G8484 FLU IMMUNIZE NO ADMIN: HCPCS | Performed by: ADVANCED PRACTICE MIDWIFE

## 2018-10-09 ENCOUNTER — OFFICE VISIT (OUTPATIENT)
Dept: FAMILY MEDICINE CLINIC | Age: 28
End: 2018-10-09
Payer: MEDICARE

## 2018-10-09 VITALS
WEIGHT: 233 LBS | SYSTOLIC BLOOD PRESSURE: 111 MMHG | HEART RATE: 98 BPM | DIASTOLIC BLOOD PRESSURE: 80 MMHG | RESPIRATION RATE: 16 BRPM | HEIGHT: 62 IN | BODY MASS INDEX: 42.88 KG/M2

## 2018-10-09 DIAGNOSIS — L30.8 PSORIASIFORM DERMATITIS: Primary | ICD-10-CM

## 2018-10-09 PROCEDURE — G8484 FLU IMMUNIZE NO ADMIN: HCPCS | Performed by: FAMILY MEDICINE

## 2018-10-09 PROCEDURE — 99213 OFFICE O/P EST LOW 20 MIN: CPT | Performed by: FAMILY MEDICINE

## 2018-10-09 PROCEDURE — G8427 DOCREV CUR MEDS BY ELIG CLIN: HCPCS | Performed by: FAMILY MEDICINE

## 2018-10-09 PROCEDURE — 1036F TOBACCO NON-USER: CPT | Performed by: FAMILY MEDICINE

## 2018-10-09 PROCEDURE — G8417 CALC BMI ABV UP PARAM F/U: HCPCS | Performed by: FAMILY MEDICINE

## 2018-10-09 ASSESSMENT — PATIENT HEALTH QUESTIONNAIRE - PHQ9
2. FEELING DOWN, DEPRESSED OR HOPELESS: 1
SUM OF ALL RESPONSES TO PHQ QUESTIONS 1-9: 2
SUM OF ALL RESPONSES TO PHQ QUESTIONS 1-9: 2
1. LITTLE INTEREST OR PLEASURE IN DOING THINGS: 1
SUM OF ALL RESPONSES TO PHQ9 QUESTIONS 1 & 2: 2

## 2018-10-31 ENCOUNTER — NURSE ONLY (OUTPATIENT)
Dept: OBGYN CLINIC | Age: 28
End: 2018-10-31
Payer: MEDICARE

## 2018-10-31 DIAGNOSIS — Z30.42 ENCOUNTER FOR SURVEILLANCE OF INJECTABLE CONTRACEPTIVE: Primary | ICD-10-CM

## 2018-10-31 PROCEDURE — 99211 OFF/OP EST MAY X REQ PHY/QHP: CPT | Performed by: ADVANCED PRACTICE MIDWIFE

## 2018-10-31 RX ORDER — MEDROXYPROGESTERONE ACETATE 150 MG/ML
150 INJECTION, SUSPENSION INTRAMUSCULAR ONCE
Status: COMPLETED | OUTPATIENT
Start: 2018-10-31 | End: 2018-10-31

## 2018-10-31 RX ADMIN — MEDROXYPROGESTERONE ACETATE 150 MG: 150 INJECTION, SUSPENSION INTRAMUSCULAR at 09:43

## 2018-11-05 ENCOUNTER — OFFICE VISIT (OUTPATIENT)
Dept: DERMATOLOGY | Age: 28
End: 2018-11-05
Payer: MEDICARE

## 2018-11-05 VITALS
DIASTOLIC BLOOD PRESSURE: 79 MMHG | SYSTOLIC BLOOD PRESSURE: 111 MMHG | HEIGHT: 62 IN | BODY MASS INDEX: 42.18 KG/M2 | HEART RATE: 90 BPM | WEIGHT: 229.2 LBS | OXYGEN SATURATION: 97 %

## 2018-11-05 DIAGNOSIS — L40.9 PSORIASIS: Primary | ICD-10-CM

## 2018-11-05 DIAGNOSIS — L40.8 SEBOPSORIASIS: ICD-10-CM

## 2018-11-05 PROCEDURE — G8484 FLU IMMUNIZE NO ADMIN: HCPCS | Performed by: DERMATOLOGY

## 2018-11-05 PROCEDURE — 99202 OFFICE O/P NEW SF 15 MIN: CPT | Performed by: DERMATOLOGY

## 2018-11-05 PROCEDURE — 1036F TOBACCO NON-USER: CPT | Performed by: DERMATOLOGY

## 2018-11-05 PROCEDURE — G8417 CALC BMI ABV UP PARAM F/U: HCPCS | Performed by: DERMATOLOGY

## 2018-11-05 PROCEDURE — G8427 DOCREV CUR MEDS BY ELIG CLIN: HCPCS | Performed by: DERMATOLOGY

## 2018-11-05 RX ORDER — DESONIDE 0.5 MG/G
CREAM TOPICAL
Qty: 60 G | Refills: 2 | Status: SHIPPED | OUTPATIENT
Start: 2018-11-05 | End: 2019-07-24

## 2018-11-05 RX ORDER — KETOCONAZOLE 20 MG/ML
SHAMPOO TOPICAL
Qty: 120 ML | Refills: 2 | Status: SHIPPED | OUTPATIENT
Start: 2018-11-05 | End: 2019-01-07 | Stop reason: SDUPTHER

## 2018-11-05 RX ORDER — AMITRIPTYLINE HYDROCHLORIDE 25 MG/1
25 TABLET, FILM COATED ORAL NIGHTLY
Status: ON HOLD | COMMUNITY
End: 2020-12-14 | Stop reason: ALTCHOICE

## 2018-11-05 RX ORDER — FLUOCINONIDE TOPICAL SOLUTION USP, 0.05% 0.5 MG/ML
SOLUTION TOPICAL
Qty: 60 ML | Refills: 2 | Status: SHIPPED | OUTPATIENT
Start: 2018-11-05 | End: 2019-01-07 | Stop reason: SDUPTHER

## 2018-11-05 NOTE — PROGRESS NOTES
Dermatology Patient Note  [facility] DERMATOLOGY  [address]  [address] [place] [address]  Dept: [phone]  Dept Fax: [phone]: 11/5/2018   REFERRING PROVIDER: Les Marin MD      Amy Wilson is a 34 y.o. female  who presents today in the office for:    New Patient (Pt states that her scalp itches and she has it on her ears and eyebrows. She thinks that it could be Psoriasis or eczema. She states that she has had it for years.)      HISTORY OF PRESENT ILLNESS:  Patient presents for rash  Location: scalp and face  Duration: years  Symptoms: itching, scaling  Course: worsening  Exacerbating factors: unsure  Prior treatments: dovonex (didn't help), kenalog shampoo (helped a littl)      CURRENT MEDICATIONS:   Current Outpatient Prescriptions   Medication Sig Dispense Refill    amitriptyline (ELAVIL) 25 MG tablet Take 25 mg by mouth nightly      fluocinonide (LIDEX) 0.05 % external solution Apply to scalp daily for rash 60 mL 2    ketoconazole (NIZORAL) 2 % shampoo Apply 3-4 times weekly to scalp, leave on for five minutes prior to washing off 120 mL 2    desonide (DESOWEN) 0.05 % cream Apply to rash twice daily 60 g 2    nortriptyline (PAMELOR) 25 MG capsule       LYRICA 50 MG capsule       medroxyPROGESTERone (DEPO-PROVERA) 150 MG/ML injection Inject 1 mL into the muscle once for 1 dose 1 mL 3     No current facility-administered medications for this visit. ALLERGIES:   Allergies   Allergen Reactions    Cat Hair Extract     Dust Mite Extract     Molds & Smuts        SOCIAL HISTORY:  Social History   Substance Use Topics    Smoking status: Former Smoker     Quit date: 1/1/2016    Smokeless tobacco: Never Used    Alcohol use No       REVIEW OF SYSTEMS:  Review of Systems  Skin: Denies any new changing, growing orbleeding lesions or rashes except as described in the HPI   Constitutional: Denies fevers, chills, and malaise.     PHYSICAL EXAM:   BP 111/79 (Site: Left Upper Arm, Position: Sitting, Cuff Size: Large Adult)   Pulse 90   Ht 5' 2\" (1.575 m)   Wt 229 lb 3.2 oz (104 kg)   SpO2 97%   BMI 41.92 kg/m²     General Exam:  General Appearance: No acute distress, Well nourished     Neuro: Alert and oriented to person, place and time  Psych: Normal affect   Lymph Node: Not performed    Cutaneous Exam: Performed as documented in clinic note below. Sun-exposed skin, which includes the head/face, neck, both arms, digits and/or nails was examined. Pertinent Physical Exam Findings:  Physical Exam  Thick psoriatic plaques of scalp  Erythematous thin scaly plaques and seborrheic distribution on face    Medical Necessity of Exam Performed:   Monitoring of side effectsfrom topical therapy    Additional Diagnostic Testing performed during exam: Not performed ,  Not performed    ASSESSMENT:   Diagnosis Orders   1. Psoriasis  fluocinonide (LIDEX) 0.05 % external solution    ketoconazole (NIZORAL) 2 % shampoo    desonide (DESOWEN) 0.05 % cream   2. Sebopsoriasis  fluocinonide (LIDEX) 0.05 % external solution    ketoconazole (NIZORAL) 2 % shampoo    desonide (DESOWEN) 0.05 % cream       Plan of Action is as Follows:  Assessment   1. Psoriasis/Sebopsoriasis - scalp and face only  - discussed diagnosis, etiology, natural course, and treatment options  - If can control topically, can avoid systemic rx and side effects.  - fluocinonide (LIDEX) 0.05 % external solution; Apply to scalp daily for rash  Dispense: 60 mL; Refill: 2  - ketoconazole (NIZORAL) 2 % shampoo;  Apply 3-4 times weekly to scalp, leave on for five minutes prior to washing off  Dispense: 120 mL; Refill: 2  - desonide (DESOWEN) 0.05 % cream; Apply to rash twice daily  Dispense: 60 g; Refill: 2    RTC 2 months       Patient Instructions   Apply Fluocinonide solution once a day to scalp  Apply Desonide to the face twice a day  Apply Ketoconazole shampoo 2-3 times per week, lather it into the scalp and

## 2018-12-28 NOTE — PROGRESS NOTES
After obtaining consent, and per orders of Mehnaz Myles CNM, injection of Medroxyprogesterone 150mg given by Marie Lopez. Patient instructed to remain in clinic for 20 minutes afterwards, and to report any adverse reaction to me immediately. Message left informing patient of normal results.

## 2019-01-07 ENCOUNTER — OFFICE VISIT (OUTPATIENT)
Dept: DERMATOLOGY | Age: 29
End: 2019-01-07
Payer: MEDICARE

## 2019-01-07 VITALS
OXYGEN SATURATION: 91 % | HEIGHT: 62 IN | TEMPERATURE: 98.3 F | WEIGHT: 229.2 LBS | SYSTOLIC BLOOD PRESSURE: 115 MMHG | HEART RATE: 103 BPM | DIASTOLIC BLOOD PRESSURE: 82 MMHG | BODY MASS INDEX: 42.18 KG/M2

## 2019-01-07 DIAGNOSIS — L40.9 PSORIASIS: ICD-10-CM

## 2019-01-07 DIAGNOSIS — L40.8 SEBOPSORIASIS: Primary | ICD-10-CM

## 2019-01-07 PROCEDURE — 1036F TOBACCO NON-USER: CPT | Performed by: DERMATOLOGY

## 2019-01-07 PROCEDURE — G8417 CALC BMI ABV UP PARAM F/U: HCPCS | Performed by: DERMATOLOGY

## 2019-01-07 PROCEDURE — 99213 OFFICE O/P EST LOW 20 MIN: CPT | Performed by: DERMATOLOGY

## 2019-01-07 PROCEDURE — G8484 FLU IMMUNIZE NO ADMIN: HCPCS | Performed by: DERMATOLOGY

## 2019-01-07 PROCEDURE — G8427 DOCREV CUR MEDS BY ELIG CLIN: HCPCS | Performed by: DERMATOLOGY

## 2019-01-07 RX ORDER — FLUOCINONIDE TOPICAL SOLUTION USP, 0.05% 0.5 MG/ML
SOLUTION TOPICAL
Qty: 60 ML | Refills: 2 | Status: SHIPPED | OUTPATIENT
Start: 2019-01-07 | End: 2019-05-24 | Stop reason: SDUPTHER

## 2019-01-07 RX ORDER — TACROLIMUS 1 MG/G
OINTMENT TOPICAL
Qty: 30 G | Refills: 2 | Status: SHIPPED | OUTPATIENT
Start: 2019-01-07 | End: 2019-05-24 | Stop reason: SDUPTHER

## 2019-01-07 RX ORDER — KETOCONAZOLE 20 MG/ML
SHAMPOO TOPICAL
Qty: 120 ML | Refills: 2 | Status: SHIPPED | OUTPATIENT
Start: 2019-01-07 | End: 2019-05-24 | Stop reason: SDUPTHER

## 2019-01-10 ENCOUNTER — TELEPHONE (OUTPATIENT)
Dept: DERMATOLOGY | Age: 29
End: 2019-01-10

## 2019-01-14 ENCOUNTER — OFFICE VISIT (OUTPATIENT)
Dept: FAMILY MEDICINE CLINIC | Age: 29
End: 2019-01-14
Payer: MEDICARE

## 2019-01-14 ENCOUNTER — HOSPITAL ENCOUNTER (OUTPATIENT)
Age: 29
Setting detail: SPECIMEN
Discharge: HOME | End: 2019-01-14
Payer: MEDICARE

## 2019-01-14 VITALS
OXYGEN SATURATION: 97 % | SYSTOLIC BLOOD PRESSURE: 115 MMHG | HEART RATE: 90 BPM | DIASTOLIC BLOOD PRESSURE: 82 MMHG | WEIGHT: 230 LBS | BODY MASS INDEX: 42.07 KG/M2 | TEMPERATURE: 97.3 F

## 2019-01-14 DIAGNOSIS — R53.82 CHRONIC FATIGUE: ICD-10-CM

## 2019-01-14 DIAGNOSIS — Z13.31 POSITIVE DEPRESSION SCREENING: ICD-10-CM

## 2019-01-14 DIAGNOSIS — R45.89 DEPRESSED MOOD: ICD-10-CM

## 2019-01-14 DIAGNOSIS — R53.82 CHRONIC FATIGUE: Primary | ICD-10-CM

## 2019-01-14 LAB
-: ABNORMAL
ABSOLUTE EOS #: 0.12 K/UL (ref 0–0.44)
ABSOLUTE IMMATURE GRANULOCYTE: <0.03 K/UL (ref 0–0.3)
ABSOLUTE LYMPH #: 2.89 K/UL (ref 1.1–3.7)
ABSOLUTE MONO #: 0.57 K/UL (ref 0.1–1.2)
ALBUMIN SERPL-MCNC: 4.2 G/DL (ref 3.5–5.2)
ALBUMIN/GLOBULIN RATIO: 1.4 (ref 1–2.5)
ALP BLD-CCNC: 74 U/L (ref 35–104)
ALT SERPL-CCNC: 14 U/L (ref 5–33)
AMORPHOUS: ABNORMAL
ANION GAP SERPL CALCULATED.3IONS-SCNC: 14 MMOL/L (ref 9–17)
AST SERPL-CCNC: 14 U/L
BACTERIA: ABNORMAL
BASOPHILS # BLD: 1 % (ref 0–2)
BASOPHILS ABSOLUTE: 0.06 K/UL (ref 0–0.2)
BILIRUB SERPL-MCNC: 0.16 MG/DL (ref 0.3–1.2)
BILIRUBIN URINE: NEGATIVE
BUN BLDV-MCNC: 11 MG/DL (ref 6–20)
BUN/CREAT BLD: ABNORMAL (ref 9–20)
CALCIUM SERPL-MCNC: 9 MG/DL (ref 8.6–10.4)
CASTS UA: ABNORMAL /LPF (ref 0–2)
CHLORIDE BLD-SCNC: 104 MMOL/L (ref 98–107)
CO2: 22 MMOL/L (ref 20–31)
COLOR: YELLOW
COMMENT UA: ABNORMAL
CREAT SERPL-MCNC: 0.92 MG/DL (ref 0.5–0.9)
CRYSTALS, UA: ABNORMAL /HPF
DIFFERENTIAL TYPE: ABNORMAL
EOSINOPHILS RELATIVE PERCENT: 2 % (ref 1–4)
EPITHELIAL CELLS UA: ABNORMAL /HPF (ref 0–5)
GFR AFRICAN AMERICAN: >60 ML/MIN
GFR NON-AFRICAN AMERICAN: >60 ML/MIN
GFR SERPL CREATININE-BSD FRML MDRD: ABNORMAL ML/MIN/{1.73_M2}
GFR SERPL CREATININE-BSD FRML MDRD: ABNORMAL ML/MIN/{1.73_M2}
GLUCOSE BLD-MCNC: 135 MG/DL (ref 70–99)
GLUCOSE URINE: NEGATIVE
HCT VFR BLD CALC: 43.6 % (ref 36.3–47.1)
HEMOGLOBIN: 13.6 G/DL (ref 11.9–15.1)
IMMATURE GRANULOCYTES: 0 %
KETONES, URINE: NEGATIVE
LEUKOCYTE ESTERASE, URINE: NEGATIVE
LYMPHOCYTES # BLD: 42 % (ref 24–43)
MCH RBC QN AUTO: 25.6 PG (ref 25.2–33.5)
MCHC RBC AUTO-ENTMCNC: 31.2 G/DL (ref 28.4–34.8)
MCV RBC AUTO: 82 FL (ref 82.6–102.9)
MONOCYTES # BLD: 8 % (ref 3–12)
MUCUS: ABNORMAL
NITRITE, URINE: NEGATIVE
NRBC AUTOMATED: 0 PER 100 WBC
OTHER OBSERVATIONS UA: ABNORMAL
PDW BLD-RTO: 13.9 % (ref 11.8–14.4)
PH UA: 5.5 (ref 5–8)
PLATELET # BLD: 367 K/UL (ref 138–453)
PLATELET ESTIMATE: ABNORMAL
PMV BLD AUTO: 10.9 FL (ref 8.1–13.5)
POTASSIUM SERPL-SCNC: 3.7 MMOL/L (ref 3.7–5.3)
PROTEIN UA: NEGATIVE
RBC # BLD: 5.32 M/UL (ref 3.95–5.11)
RBC # BLD: ABNORMAL 10*6/UL
RBC UA: ABNORMAL /HPF (ref 0–2)
RENAL EPITHELIAL, UA: ABNORMAL /HPF
SEG NEUTROPHILS: 47 % (ref 36–65)
SEGMENTED NEUTROPHILS ABSOLUTE COUNT: 3.23 K/UL (ref 1.5–8.1)
SODIUM BLD-SCNC: 140 MMOL/L (ref 135–144)
SPECIFIC GRAVITY UA: 1.02 (ref 1–1.03)
THYROXINE, FREE: 1.07 NG/DL (ref 0.93–1.7)
TOTAL PROTEIN: 7.3 G/DL (ref 6.4–8.3)
TRICHOMONAS: ABNORMAL
TSH SERPL DL<=0.05 MIU/L-ACNC: 1.3 MIU/L (ref 0.3–5)
TURBIDITY: ABNORMAL
URINE HGB: ABNORMAL
UROBILINOGEN, URINE: NORMAL
VITAMIN B-12: 803 PG/ML (ref 232–1245)
VITAMIN D 25-HYDROXY: 20.4 NG/ML (ref 30–100)
WBC # BLD: 6.9 K/UL (ref 3.5–11.3)
WBC # BLD: ABNORMAL 10*3/UL
WBC UA: ABNORMAL /HPF (ref 0–5)
YEAST: ABNORMAL

## 2019-01-14 PROCEDURE — 1036F TOBACCO NON-USER: CPT | Performed by: FAMILY MEDICINE

## 2019-01-14 PROCEDURE — G8431 POS CLIN DEPRES SCRN F/U DOC: HCPCS | Performed by: FAMILY MEDICINE

## 2019-01-14 PROCEDURE — G8417 CALC BMI ABV UP PARAM F/U: HCPCS | Performed by: FAMILY MEDICINE

## 2019-01-14 PROCEDURE — G8484 FLU IMMUNIZE NO ADMIN: HCPCS | Performed by: FAMILY MEDICINE

## 2019-01-14 PROCEDURE — G8427 DOCREV CUR MEDS BY ELIG CLIN: HCPCS | Performed by: FAMILY MEDICINE

## 2019-01-14 PROCEDURE — 99213 OFFICE O/P EST LOW 20 MIN: CPT | Performed by: FAMILY MEDICINE

## 2019-01-14 RX ORDER — PAROXETINE HYDROCHLORIDE 12.5 MG/1
12.5 TABLET, FILM COATED, EXTENDED RELEASE ORAL DAILY
Qty: 30 TABLET | Refills: 3 | Status: SHIPPED | OUTPATIENT
Start: 2019-01-14 | End: 2019-02-18 | Stop reason: SDUPTHER

## 2019-01-14 ASSESSMENT — PATIENT HEALTH QUESTIONNAIRE - PHQ9
4. FEELING TIRED OR HAVING LITTLE ENERGY: 2
10. IF YOU CHECKED OFF ANY PROBLEMS, HOW DIFFICULT HAVE THESE PROBLEMS MADE IT FOR YOU TO DO YOUR WORK, TAKE CARE OF THINGS AT HOME, OR GET ALONG WITH OTHER PEOPLE: 0
9. THOUGHTS THAT YOU WOULD BE BETTER OFF DEAD, OR OF HURTING YOURSELF: 0
8. MOVING OR SPEAKING SO SLOWLY THAT OTHER PEOPLE COULD HAVE NOTICED. OR THE OPPOSITE, BEING SO FIGETY OR RESTLESS THAT YOU HAVE BEEN MOVING AROUND A LOT MORE THAN USUAL: 0
SUM OF ALL RESPONSES TO PHQ9 QUESTIONS 1 & 2: 5
5. POOR APPETITE OR OVEREATING: 0
7. TROUBLE CONCENTRATING ON THINGS, SUCH AS READING THE NEWSPAPER OR WATCHING TELEVISION: 1
1. LITTLE INTEREST OR PLEASURE IN DOING THINGS: 3
6. FEELING BAD ABOUT YOURSELF - OR THAT YOU ARE A FAILURE OR HAVE LET YOURSELF OR YOUR FAMILY DOWN: 2
SUM OF ALL RESPONSES TO PHQ QUESTIONS 1-9: 12
SUM OF ALL RESPONSES TO PHQ QUESTIONS 1-9: 12
2. FEELING DOWN, DEPRESSED OR HOPELESS: 2
3. TROUBLE FALLING OR STAYING ASLEEP: 2

## 2019-01-15 LAB — THYROID PEROXIDASE (TPO) AB: <10 IU/ML (ref 0–35)

## 2019-01-16 ENCOUNTER — TELEPHONE (OUTPATIENT)
Dept: FAMILY MEDICINE CLINIC | Age: 29
End: 2019-01-16

## 2019-01-16 ENCOUNTER — NURSE ONLY (OUTPATIENT)
Dept: OBGYN CLINIC | Age: 29
End: 2019-01-16
Payer: MEDICARE

## 2019-01-16 DIAGNOSIS — Z30.42 ENCOUNTER FOR SURVEILLANCE OF INJECTABLE CONTRACEPTIVE: Primary | ICD-10-CM

## 2019-01-16 PROCEDURE — 99211 OFF/OP EST MAY X REQ PHY/QHP: CPT | Performed by: ADVANCED PRACTICE MIDWIFE

## 2019-01-16 RX ORDER — MEDROXYPROGESTERONE ACETATE 150 MG/ML
150 INJECTION, SUSPENSION INTRAMUSCULAR ONCE
Status: COMPLETED | OUTPATIENT
Start: 2019-01-16 | End: 2019-01-16

## 2019-01-16 RX ADMIN — MEDROXYPROGESTERONE ACETATE 150 MG: 150 INJECTION, SUSPENSION INTRAMUSCULAR at 10:25

## 2019-01-24 ENCOUNTER — TELEPHONE (OUTPATIENT)
Dept: FAMILY MEDICINE CLINIC | Age: 29
End: 2019-01-24

## 2019-02-18 ENCOUNTER — OFFICE VISIT (OUTPATIENT)
Dept: FAMILY MEDICINE CLINIC | Age: 29
End: 2019-02-18
Payer: MEDICARE

## 2019-02-18 VITALS
HEART RATE: 92 BPM | OXYGEN SATURATION: 97 % | WEIGHT: 231 LBS | DIASTOLIC BLOOD PRESSURE: 81 MMHG | SYSTOLIC BLOOD PRESSURE: 111 MMHG | BODY MASS INDEX: 42.25 KG/M2

## 2019-02-18 DIAGNOSIS — R45.89 DEPRESSED MOOD: Primary | ICD-10-CM

## 2019-02-18 DIAGNOSIS — E55.9 VITAMIN D DEFICIENCY: ICD-10-CM

## 2019-02-18 PROCEDURE — G8484 FLU IMMUNIZE NO ADMIN: HCPCS | Performed by: FAMILY MEDICINE

## 2019-02-18 PROCEDURE — G8417 CALC BMI ABV UP PARAM F/U: HCPCS | Performed by: FAMILY MEDICINE

## 2019-02-18 PROCEDURE — 1036F TOBACCO NON-USER: CPT | Performed by: FAMILY MEDICINE

## 2019-02-18 PROCEDURE — G8427 DOCREV CUR MEDS BY ELIG CLIN: HCPCS | Performed by: FAMILY MEDICINE

## 2019-02-18 PROCEDURE — 99213 OFFICE O/P EST LOW 20 MIN: CPT | Performed by: FAMILY MEDICINE

## 2019-02-18 RX ORDER — PAROXETINE HYDROCHLORIDE 25 MG/1
25 TABLET, FILM COATED, EXTENDED RELEASE ORAL DAILY
Qty: 30 TABLET | Refills: 3 | Status: SHIPPED | OUTPATIENT
Start: 2019-02-18 | End: 2019-05-30 | Stop reason: SDUPTHER

## 2019-02-18 RX ORDER — ERGOCALCIFEROL (VITAMIN D2) 1250 MCG
50000 CAPSULE ORAL WEEKLY
Qty: 4 CAPSULE | Refills: 3 | Status: SHIPPED | OUTPATIENT
Start: 2019-02-18 | End: 2019-07-25 | Stop reason: SDUPTHER

## 2019-04-03 ENCOUNTER — NURSE ONLY (OUTPATIENT)
Dept: OBGYN CLINIC | Age: 29
End: 2019-04-03
Payer: MEDICARE

## 2019-04-03 DIAGNOSIS — Z30.42 ENCOUNTER FOR SURVEILLANCE OF INJECTABLE CONTRACEPTIVE: Primary | ICD-10-CM

## 2019-04-03 PROCEDURE — 96372 THER/PROPH/DIAG INJ SC/IM: CPT | Performed by: ADVANCED PRACTICE MIDWIFE

## 2019-04-03 RX ORDER — MEDROXYPROGESTERONE ACETATE 150 MG/ML
150 INJECTION, SUSPENSION INTRAMUSCULAR ONCE
Status: COMPLETED | OUTPATIENT
Start: 2019-04-03 | End: 2019-04-03

## 2019-04-03 RX ADMIN — MEDROXYPROGESTERONE ACETATE 150 MG: 150 INJECTION, SUSPENSION INTRAMUSCULAR at 10:06

## 2019-05-24 ENCOUNTER — OFFICE VISIT (OUTPATIENT)
Dept: DERMATOLOGY | Age: 29
End: 2019-05-24
Payer: MEDICARE

## 2019-05-24 VITALS
HEIGHT: 62 IN | BODY MASS INDEX: 42.84 KG/M2 | HEART RATE: 87 BPM | SYSTOLIC BLOOD PRESSURE: 112 MMHG | OXYGEN SATURATION: 92 % | WEIGHT: 232.8 LBS | DIASTOLIC BLOOD PRESSURE: 77 MMHG

## 2019-05-24 DIAGNOSIS — L40.9 PSORIASIS: ICD-10-CM

## 2019-05-24 DIAGNOSIS — L40.8 SEBOPSORIASIS: ICD-10-CM

## 2019-05-24 DIAGNOSIS — M25.561 ARTHRALGIA OF RIGHT KNEE: Primary | ICD-10-CM

## 2019-05-24 PROCEDURE — G8427 DOCREV CUR MEDS BY ELIG CLIN: HCPCS | Performed by: DERMATOLOGY

## 2019-05-24 PROCEDURE — 99213 OFFICE O/P EST LOW 20 MIN: CPT | Performed by: DERMATOLOGY

## 2019-05-24 PROCEDURE — G8417 CALC BMI ABV UP PARAM F/U: HCPCS | Performed by: DERMATOLOGY

## 2019-05-24 PROCEDURE — 1036F TOBACCO NON-USER: CPT | Performed by: DERMATOLOGY

## 2019-05-24 RX ORDER — TACROLIMUS 1 MG/G
OINTMENT TOPICAL
Qty: 30 G | Refills: 2 | Status: SHIPPED | OUTPATIENT
Start: 2019-05-24 | End: 2019-07-24

## 2019-05-24 RX ORDER — KETOCONAZOLE 20 MG/ML
SHAMPOO TOPICAL
Qty: 120 ML | Refills: 2 | Status: SHIPPED | OUTPATIENT
Start: 2019-05-24 | End: 2019-07-24

## 2019-05-24 RX ORDER — FLUOCINONIDE TOPICAL SOLUTION USP, 0.05% 0.5 MG/ML
SOLUTION TOPICAL
Qty: 60 ML | Refills: 2 | Status: SHIPPED | OUTPATIENT
Start: 2019-05-24 | End: 2019-07-24

## 2019-05-24 NOTE — PATIENT INSTRUCTIONS
1. Continue ketoconazole and fluocinonide for scalp  2. Continue tacrolimus (PROTOPIC) 0.1 % ointment  3. Apply triamcinolone 0.1% twice daily to active plaques on elbows  4. Referral to rheumatology  5. Follow up in 4 months    METHOTREXATE    What is methotrexate and how does it work? Methotrexate is known as a disease-modifying drug or DMARD. It slows the  production of new cells by the bodys immune system (the body's immune  defence system) and reduces inflammation. It was first used to treat certain  types of cancer and was discovered to be an effective treatment for skin and  joint diseases when used at lower doses. Which skin conditions are treated with methotrexate? The diseases that methotrexate is used to treat include psoriasis, and  associated psoriatic arthritis, eczema, pemphigoid, pemphigus, sarcoidosis,  scleroderma and dermatomyositis. How long will I need to take methotrexate before it has an effect? It may take 3-12 weeks before you notice significant improvement in your  condition after starting methotrexate. When should I take methotrexate? Methotrexate is usually taken in tablet form once a week and should be taken  on the same day each week. It should never be taken every day. The tablets  are taken with food and should be swallowed whole, not crushed or chewed. Methotrexate may also be given once a week by injection - either subcutaneous  (an injection under the skin), or intramuscular (an injection into a muscle, for  example of the buttocks or thigh). Dermatologists usually prescribe 2.5 mg strength tablets of methotrexate. These tablets must not be confused with the 10 mg tablets, which look similar  but are clearly of a much higher strength. Care should be taken to make sure  that the correct dose and strength has been prescribed and dispensed to you. You must always check the dose and strength with your  or doctor  before taking methotrexate.     If you miss taking methotrexate on your normal day, you can take it within 48  hours. However, if you are more than three days late you should not take the  methotrexate that week. You should take your next dose on the usual day the  following week. What dose should I take? Your doctor will advise you on the weekly dose. Usually a small dose is  prescribed at first and the dose is gradually increased until the drug is effective  at a safe dose. The dose will be adjusted according to your response to  treatment and any side effects you may experience. What are the possible side effects of methotrexate? Methotrexate can cause nausea, tiredness, diarrhoea or mouth ulcers. Rarely  hair loss and rashes may occur. Methotrexate can affect the white blood cell  count so that fewer white blood cells are produced (bone marrow suppression). You will be more prone to develop infections such as chest infections. You should see your doctor if:   You develop a sore throat, a fever or any other symptoms or signs of  infection.  You develop mouth ulcers.  You develop unexplained bruising or bleeding from the gums.  You develop nausea, vomiting, abdominal pain or dark urine.  You become breathless or develop a cough. Folic acid is frequently recommended as a vitamin supplement when taking  methotrexate because it may reduce the incidence of side effects such as  nausea. Liver and lung fibrosis are very rare complications that may occur when the  methotrexate has been taken for a number of years. If you have not had chicken-pox previously and come into contact with someone  who has chicken-pox or shingles infection, or if you develop chicken-pox or  shingles while you are taking methotrexate, you should see your doctor  immediately as you may need specialist treatment. How will I be monitored for the side effects of methotrexate treatment?     Your doctor will arrange for you to have regular blood tests alcohol consumption to a  minimum while taking methotrexate. Can I take other medicines at the same time as methotrexate? Some drugs interact with methotrexate and this can be dangerous. You should  always tell any doctor, nurse or pharmacist treating you that you are taking  Methotrexate. Special care is needed with non-steroidal anti-inflammatory drugs, such as  aspirin or ibuprofen. You should only take anti-inflammatory drugs if your doctor  prescribes them for you. Paracetamol preparations are generally safer to take. Do not take 'over-the-counter' herbal or vitamin preparations without discussing  this first with your doctor, nurse or pharmacist. You must always avoid  antibiotics containing trimethoprim. Where can I find out more about methotrexate? If you want to know more about methotrexate, you should speak to your doctor  or pharmacist. Please note that this information leaflet does not list all of the  side effects of methotrexate. For further details, look at the drug information  sheet which comes as an insert with your prescription for methotrexate.     Source: [facility]

## 2019-05-24 NOTE — PROGRESS NOTES
Dermatology Patient Note  [facility] DERMATOLOGY  [address]  [address] [place] [address]  Dept: [phone]  Dept Fax: [phone]      VISITDATE: 5/24/2019   REFERRING PROVIDER: No ref. provider found      Yordan Martinez is a 34 y.o. female  who presents today in the office for:    Follow-up (Patient is here for her 3 month follow-up for psoriasis. Patient states she is using ketoconozole shampoo, protopic, and desonide with improvement. )      HISTORY OF PRESENT ILLNESS:  Patients presents for f/u psoriasis  Interval history: significant improvement of face and scalp, nearly clear, but has new lesions on elbows  Patient assessment of progress: near complete and adequate  Current treatment and adherence: keto shampoo, fluocinonide for scalp, desonide and protopic for face  Prior treatments tried: see initial HPI  Patient describes chronic back pain from bulging disk, right knee pain, fibromyalgia.   Has not seen rheumatology  Interested in [address] [place] [address] for psoriasis      CURRENT MEDICATIONS:   Current Outpatient Medications   Medication Sig Dispense Refill    triamcinolone (KENALOG) 0.1 % ointment Apply to rash on body twice daily (not face, armpit, or groin) 80 g 2    tacrolimus (PROTOPIC) 0.1 % ointment Apply to rash twice daily 30 g 2    ketoconazole (NIZORAL) 2 % shampoo Apply 3-4 times weekly to scalp, leave on for five minutes prior to washing off 120 mL 2    fluocinonide (LIDEX) 0.05 % external solution Apply to scalp daily for rash 60 mL 2    PARoxetine (PAXIL CR) 25 MG extended release tablet Take 1 tablet by mouth daily 30 tablet 3    ergocalciferol (ERGOCALCIFEROL) 02164 units capsule Take 1 capsule by mouth once a week 4 capsule 3    amitriptyline (ELAVIL) 25 MG tablet Take 25 mg by mouth nightly      desonide (DESOWEN) 0.05 % cream Apply to rash twice daily 60 g 2    LYRICA 50 MG capsule       medroxyPROGESTERone (DEPO-PROVERA) 150 MG/ML injection Inject 1 mL into the muscle once for 1 dose 1 mL 3     No current facility-administered medications for this visit. ALLERGIES:   Allergies   Allergen Reactions    Cat Hair Extract     Dust Mite Extract     Molds & Smuts        SOCIAL HISTORY:  Social History     Tobacco Use    Smoking status: Former Smoker     Last attempt to quit: 1/1/2016     Years since quitting: 3.3    Smokeless tobacco: Never Used   Substance Use Topics    Alcohol use: No     Alcohol/week: 0.0 oz       REVIEW OF SYSTEMS:  Review of Systems  Skin: Denies any new changing, growing orbleeding lesions or rashes except as described in the HPI   Constitutional: Denies fevers, chills, and malaise. PHYSICAL EXAM:   /77 (Site: Left Upper Arm, Position: Sitting, Cuff Size: Large Adult)   Pulse 87   Ht 5' 2\" (1.575 m)   Wt 232 lb 12.8 oz (105.6 kg)   SpO2 92%   BMI 42.58 kg/m²     General Exam:  General Appearance: No acute distress, Well nourished     Neuro: Alert and oriented to person, place and time  Psych: Normal affect   Lymph Node: Not performed    Cutaneous Exam: Performed as documented in clinic note below. Sun-exposed skin, which includes the head/face, neck, both arms, digits and/or nails was examined. Pertinent Physical Exam Findings:  Physical Exam  Thin psoriatic plaques of elbows  Thin isolated psoriatic plaques of scalp  Face clear    Photo surveillance performed: No    Medical Necessity of Exam Performed:   Distribution of patient concerns    Additional Diagnostic Testing performed during exam: Not performed ,  Not performed    ASSESSMENT:   Diagnosis Orders   1. Arthralgia of right knee  AFL - [place] Bamberger, MD, Rheumatology, [place]   2. Sebopsoriasis  triamcinolone (KENALOG) 0.1 % ointment    tacrolimus (PROTOPIC) 0.1 % ointment    ketoconazole (NIZORAL) 2 % shampoo    fluocinonide (LIDEX) 0.05 % external solution   3.  Psoriasis  triamcinolone (KENALOG) 0.1 % ointment    tacrolimus (PROTOPIC) 0.1 % ointment ketoconazole (NIZORAL) 2 % shampoo    fluocinonide (LIDEX) 0.05 % external solution    KIM Nam MD, Rheumatology, [facility] is as Follows:  Assessment   1. Psoriasis/Sebopsoriasis, 5% BSA  - discussed that biologics use limited by insurance requirements to moderate-severe psoriasis or psoriatic arthritis. Patient has joint pain so will refer for evaluation  - if still interested in systemic and no PsA, can consider MTX  - add tmc for elbow  - triamcinolone (KENALOG) 0.1 % ointment; Apply to rash on body twice daily (not face, armpit, or groin)  Dispense: 80 g; Refill: 2  - tacrolimus (PROTOPIC) 0.1 % ointment; Apply to rash twice daily  Dispense: 30 g; Refill: 2  - ketoconazole (NIZORAL) 2 % shampoo; Apply 3-4 times weekly to scalp, leave on for five minutes prior to washing off  Dispense: 120 mL; Refill: 2  - fluocinonide (LIDEX) 0.05 % external solution; Apply to scalp daily for rash  Dispense: 60 mL; Refill: 2    3. Arthralgia of right knee  - KIM Nam MD, Rheumatology, [facility] 4 months            Patient Instructions   1. Continue ketoconazole and fluocinonide for scalp  2. Continue tacrolimus (PROTOPIC) 0.1 % ointment  3. Apply triamcinolone 0.1% twice daily to active plaques on elbows  4. Referral to rheumatology  5. Follow up in 4 months    METHOTREXATE    What is methotrexate and how does it work? Methotrexate is known as a disease-modifying drug or DMARD. It slows the  production of new cells by the bodys immune system (the body's immune  defence system) and reduces inflammation. It was first used to treat certain  types of cancer and was discovered to be an effective treatment for skin and  joint diseases when used at lower doses. Which skin conditions are treated with methotrexate?     The diseases that methotrexate is used to treat include psoriasis, and  associated psoriatic arthritis, eczema, pemphigoid, pemphigus, sarcoidosis,  scleroderma and dermatomyositis. How long will I need to take methotrexate before it has an effect? It may take 3-12 weeks before you notice significant improvement in your  condition after starting methotrexate. When should I take methotrexate? Methotrexate is usually taken in tablet form once a week and should be taken  on the same day each week. It should never be taken every day. The tablets  are taken with food and should be swallowed whole, not crushed or chewed. Methotrexate may also be given once a week by injection - either subcutaneous  (an injection under the skin), or intramuscular (an injection into a muscle, for  example of the buttocks or thigh). Dermatologists usually prescribe 2.5 mg strength tablets of methotrexate. These tablets must not be confused with the 10 mg tablets, which look similar  but are clearly of a much higher strength. Care should be taken to make sure  that the correct dose and strength has been prescribed and dispensed to you. You must always check the dose and strength with your  or doctor  before taking methotrexate. If you miss taking methotrexate on your normal day, you can take it within 48  hours. However, if you are more than three days late you should not take the  methotrexate that week. You should take your next dose on the usual day the  following week. What dose should I take? Your doctor will advise you on the weekly dose. Usually a small dose is  prescribed at first and the dose is gradually increased until the drug is effective  at a safe dose. The dose will be adjusted according to your response to  treatment and any side effects you may experience. What are the possible side effects of methotrexate? Methotrexate can cause nausea, tiredness, diarrhoea or mouth ulcers. Rarely  hair loss and rashes may occur.  Methotrexate can affect the white blood cell  count so that fewer white blood cells are produced (bone months  before the vaccination and not prescribed until 2 weeks after the vaccination  has been given. Yearly flu and five yearly Pneumovax vaccinations are safe  and recommended. However, the new nasal flu vaccination is a live vaccine  and should not be given to patients on methotrexate (see Patient Information  Leaflet on Immunisations). Does methotrexate affect fertility, pregnancy and breast feeding? Methotrexate can reduce fertility in men, especially at higher doses, and is likely  to harm an unborn baby. Women must not take methotrexate during  pregnancy. Men and women should take effective contraceptive precautions  whilst taking methotrexate and for at least 3 months after stopping the  Methotrexate. If you are planning a family, or if you become pregnant while on methotrexate,  you must discuss this with your doctor as soon as possible. You must not breast feed if you are taking methotrexate. May I drink alcohol while I am taking methotrexate? Alcohol does interact with methotrexate. Both alcohol and methotrexate can  potentially damage the liver. It is advisable to keep alcohol consumption to a  minimum while taking methotrexate. Can I take other medicines at the same time as methotrexate? Some drugs interact with methotrexate and this can be dangerous. You should  always tell any doctor, nurse or pharmacist treating you that you are taking  Methotrexate. Special care is needed with non-steroidal anti-inflammatory drugs, such as  aspirin or ibuprofen. You should only take anti-inflammatory drugs if your doctor  prescribes them for you. Paracetamol preparations are generally safer to take. Do not take 'over-the-counter' herbal or vitamin preparations without discussing  this first with your doctor, nurse or pharmacist. You must always avoid  antibiotics containing trimethoprim. Where can I find out more about methotrexate?     If you want to know more about methotrexate, you should speak to your doctor  or pharmacist. Please note that this information leaflet does not list all of the  side effects of methotrexate. For further details, look at the drug information  sheet which comes as an insert with your prescription for methotrexate. Source: [facility] Dermat[facility]        Follow-up: No follow-ups on file. This note was created with the assistance of a speech-recognition program.  Although the intention is to generate a document that actually reflects the content of the visit, no guarantees can be provided that every mistake has been identified and corrected byediting.     Electronically signed by Uziel Evans MD on 5/24/19 at 10:49 AM

## 2019-05-30 DIAGNOSIS — R45.89 DEPRESSED MOOD: ICD-10-CM

## 2019-05-30 RX ORDER — PAROXETINE HYDROCHLORIDE 25 MG/1
TABLET, FILM COATED, EXTENDED RELEASE ORAL
Qty: 30 TABLET | Refills: 3 | Status: SHIPPED | OUTPATIENT
Start: 2019-05-30 | End: 2019-06-12 | Stop reason: ALTCHOICE

## 2019-06-12 ENCOUNTER — TELEPHONE (OUTPATIENT)
Dept: FAMILY MEDICINE CLINIC | Age: 29
End: 2019-06-12

## 2019-06-12 RX ORDER — PAROXETINE HYDROCHLORIDE 37.5 MG/1
37.5 TABLET, FILM COATED, EXTENDED RELEASE ORAL DAILY
Qty: 30 TABLET | Refills: 3 | Status: SHIPPED | OUTPATIENT
Start: 2019-06-12 | End: 2019-10-21 | Stop reason: SDUPTHER

## 2019-06-12 NOTE — TELEPHONE ENCOUNTER
Pt states paxil er 25mg is no longer working for her for the past month and is requesting an alternate med be sent to [facility] [address].

## 2019-06-19 ENCOUNTER — NURSE ONLY (OUTPATIENT)
Dept: OBGYN CLINIC | Age: 29
End: 2019-06-19
Payer: MEDICARE

## 2019-06-19 PROCEDURE — 96372 THER/PROPH/DIAG INJ SC/IM: CPT | Performed by: ADVANCED PRACTICE MIDWIFE

## 2019-06-19 RX ORDER — MEDROXYPROGESTERONE ACETATE 150 MG/ML
150 INJECTION, SUSPENSION INTRAMUSCULAR ONCE
Status: COMPLETED | OUTPATIENT
Start: 2019-06-19 | End: 2019-06-19

## 2019-06-19 RX ADMIN — MEDROXYPROGESTERONE ACETATE 150 MG: 150 INJECTION, SUSPENSION INTRAMUSCULAR at 13:11

## 2019-07-24 ENCOUNTER — OFFICE VISIT (OUTPATIENT)
Dept: FAMILY MEDICINE CLINIC | Age: 29
End: 2019-07-24
Payer: MEDICARE

## 2019-07-24 ENCOUNTER — HOSPITAL ENCOUNTER (OUTPATIENT)
Age: 29
Setting detail: SPECIMEN
Discharge: HOME | End: 2019-07-24
Payer: MEDICARE

## 2019-07-24 VITALS
TEMPERATURE: 98.3 F | BODY MASS INDEX: 42.98 KG/M2 | DIASTOLIC BLOOD PRESSURE: 74 MMHG | HEART RATE: 80 BPM | OXYGEN SATURATION: 98 % | WEIGHT: 235 LBS | SYSTOLIC BLOOD PRESSURE: 103 MMHG

## 2019-07-24 DIAGNOSIS — R45.89 DEPRESSED MOOD: Primary | ICD-10-CM

## 2019-07-24 DIAGNOSIS — E55.9 VITAMIN D DEFICIENCY: ICD-10-CM

## 2019-07-24 LAB — VITAMIN D 25-HYDROXY: 25 NG/ML (ref 30–100)

## 2019-07-24 PROCEDURE — 1036F TOBACCO NON-USER: CPT | Performed by: FAMILY MEDICINE

## 2019-07-24 PROCEDURE — G8417 CALC BMI ABV UP PARAM F/U: HCPCS | Performed by: FAMILY MEDICINE

## 2019-07-24 PROCEDURE — 99213 OFFICE O/P EST LOW 20 MIN: CPT | Performed by: FAMILY MEDICINE

## 2019-07-24 PROCEDURE — G8427 DOCREV CUR MEDS BY ELIG CLIN: HCPCS | Performed by: FAMILY MEDICINE

## 2019-07-24 RX ORDER — PAROXETINE HYDROCHLORIDE 12.5 MG/1
12.5 TABLET, FILM COATED, EXTENDED RELEASE ORAL DAILY
Qty: 30 TABLET | Refills: 3 | Status: SHIPPED | OUTPATIENT
Start: 2019-07-24 | End: 2020-03-04

## 2019-07-25 DIAGNOSIS — E55.9 VITAMIN D DEFICIENCY: Primary | ICD-10-CM

## 2019-07-25 RX ORDER — ERGOCALCIFEROL (VITAMIN D2) 1250 MCG
50000 CAPSULE ORAL WEEKLY
Qty: 4 CAPSULE | Refills: 3 | Status: SHIPPED | OUTPATIENT
Start: 2019-07-25 | End: 2019-11-05 | Stop reason: SDUPTHER

## 2019-08-19 ENCOUNTER — OFFICE VISIT (OUTPATIENT)
Dept: FAMILY MEDICINE CLINIC | Age: 29
End: 2019-08-19
Payer: MEDICARE

## 2019-08-19 VITALS
HEART RATE: 81 BPM | SYSTOLIC BLOOD PRESSURE: 116 MMHG | WEIGHT: 235 LBS | DIASTOLIC BLOOD PRESSURE: 85 MMHG | TEMPERATURE: 98.6 F | BODY MASS INDEX: 42.98 KG/M2 | OXYGEN SATURATION: 95 %

## 2019-08-19 DIAGNOSIS — Z72.89: ICD-10-CM

## 2019-08-19 DIAGNOSIS — R05.9 COUGH: Primary | ICD-10-CM

## 2019-08-19 PROCEDURE — 1036F TOBACCO NON-USER: CPT | Performed by: FAMILY MEDICINE

## 2019-08-19 PROCEDURE — 99213 OFFICE O/P EST LOW 20 MIN: CPT | Performed by: FAMILY MEDICINE

## 2019-08-19 PROCEDURE — G8427 DOCREV CUR MEDS BY ELIG CLIN: HCPCS | Performed by: FAMILY MEDICINE

## 2019-08-19 PROCEDURE — G8417 CALC BMI ABV UP PARAM F/U: HCPCS | Performed by: FAMILY MEDICINE

## 2019-08-19 RX ORDER — AZITHROMYCIN 250 MG/1
250 TABLET, FILM COATED ORAL SEE ADMIN INSTRUCTIONS
Qty: 6 TABLET | Refills: 0 | Status: SHIPPED | OUTPATIENT
Start: 2019-08-19 | End: 2019-08-24

## 2019-08-19 NOTE — PROGRESS NOTES
Visit Information    Have you changed or started any medications since your last visit including any over-the-counter medicines, vitamins, or herbal medicines? no   Are you having any side effects from any of your medications? -  no  Have you stopped taking any of your medications? Is so, why? -  no    Have you seen any other physician or provider since your last visit? No  Have you had any other diagnostic tests since your last visit? No  Have you been seen in the emergency room and/or had an admission to a hospital since we last saw you? No  Have you had your routine dental cleaning in the past 6 months? no    Have you activated your [facility] [employer] account? If not, what are your barriers?  Yes     Patient Care Team:  Marlen Ashton MD as PCP - General (Family Medicine)  Marlen Ashton MD as PCP - [facility]  Ryan Corbin MD as Consulting Physician (Obstetrics & Gynecology)    Medical History Review  Past Medical, Family, and Social History reviewed and does not contribute to the patient presenting condition    Health Maintenance   Topic Date Due    Varicella Vaccine (1 of 2 - 13+ 2-dose series) 07/18/2005    HIV screen  07/18/2007    DTaP/Tdap/Td vaccine (1 - Tdap) 07/18/2011    A1C test (Diabetic or Prediabetic)  07/28/2018    Cervical cancer screen  08/24/2019    Flu vaccine (1) 02/18/2020 (Originally 9/1/2019)    HPV vaccine  Aged Out    Pneumococcal 0-64 years Vaccine  Aged Out

## 2019-09-05 DIAGNOSIS — Z30.013 ENCOUNTER FOR INITIAL PRESCRIPTION OF INJECTABLE CONTRACEPTIVE: ICD-10-CM

## 2019-09-05 RX ORDER — MEDROXYPROGESTERONE ACETATE 150 MG/ML
INJECTION, SUSPENSION INTRAMUSCULAR
Qty: 1 ML | Refills: 0 | Status: SHIPPED | OUTPATIENT
Start: 2019-09-05 | End: 2019-11-30 | Stop reason: SDUPTHER

## 2019-09-10 ENCOUNTER — OFFICE VISIT (OUTPATIENT)
Dept: OBGYN CLINIC | Age: 29
End: 2019-09-10
Payer: MEDICARE

## 2019-09-10 VITALS
WEIGHT: 233.8 LBS | BODY MASS INDEX: 43.02 KG/M2 | DIASTOLIC BLOOD PRESSURE: 79 MMHG | SYSTOLIC BLOOD PRESSURE: 119 MMHG | HEIGHT: 62 IN | HEART RATE: 82 BPM

## 2019-09-10 DIAGNOSIS — Z30.42 SURVEILLANCE FOR DEPO-PROVERA CONTRACEPTION: Primary | ICD-10-CM

## 2019-09-10 DIAGNOSIS — Z00.00 WELL WOMAN EXAM (NO GYNECOLOGICAL EXAM): ICD-10-CM

## 2019-09-10 DIAGNOSIS — N94.6 DYSMENORRHEA: ICD-10-CM

## 2019-09-10 LAB
CONTROL: PRESENT
PREGNANCY TEST URINE, POC: NEGATIVE

## 2019-09-10 PROCEDURE — 99395 PREV VISIT EST AGE 18-39: CPT | Performed by: ADVANCED PRACTICE MIDWIFE

## 2019-09-10 PROCEDURE — 96372 THER/PROPH/DIAG INJ SC/IM: CPT | Performed by: ADVANCED PRACTICE MIDWIFE

## 2019-09-10 PROCEDURE — 81025 URINE PREGNANCY TEST: CPT | Performed by: ADVANCED PRACTICE MIDWIFE

## 2019-09-10 RX ORDER — MEDROXYPROGESTERONE ACETATE 150 MG/ML
150 INJECTION, SUSPENSION INTRAMUSCULAR ONCE
Status: COMPLETED | OUTPATIENT
Start: 2019-09-10 | End: 2019-09-10

## 2019-09-10 RX ORDER — TRAMADOL HYDROCHLORIDE 50 MG/1
TABLET ORAL
Refills: 2 | Status: ON HOLD | COMMUNITY
Start: 2019-07-08 | End: 2020-12-14 | Stop reason: ALTCHOICE

## 2019-09-10 RX ORDER — DICLOFENAC SODIUM 75 MG/1
TABLET, DELAYED RELEASE ORAL
Refills: 4 | COMMUNITY
Start: 2019-09-06 | End: 2022-06-09 | Stop reason: ALTCHOICE

## 2019-09-10 RX ORDER — ROPINIROLE 0.5 MG/1
TABLET, FILM COATED ORAL
Refills: 4 | Status: ON HOLD | COMMUNITY
Start: 2019-09-06 | End: 2020-12-14 | Stop reason: ALTCHOICE

## 2019-09-10 RX ADMIN — MEDROXYPROGESTERONE ACETATE 150 MG: 150 INJECTION, SUSPENSION INTRAMUSCULAR at 14:12

## 2019-09-10 NOTE — PATIENT INSTRUCTIONS
Call w concerns    Consider adding exercise for weight management and daily vitamin, especially if desires pregnancy

## 2019-09-30 ENCOUNTER — OFFICE VISIT (OUTPATIENT)
Dept: DERMATOLOGY | Age: 29
End: 2019-09-30
Payer: MEDICARE

## 2019-09-30 VITALS
WEIGHT: 237 LBS | BODY MASS INDEX: 43.61 KG/M2 | SYSTOLIC BLOOD PRESSURE: 106 MMHG | OXYGEN SATURATION: 96 % | HEIGHT: 62 IN | HEART RATE: 83 BPM | DIASTOLIC BLOOD PRESSURE: 75 MMHG

## 2019-09-30 DIAGNOSIS — L40.8 SEBOPSORIASIS: Primary | ICD-10-CM

## 2019-09-30 PROCEDURE — 1036F TOBACCO NON-USER: CPT | Performed by: DERMATOLOGY

## 2019-09-30 PROCEDURE — G8427 DOCREV CUR MEDS BY ELIG CLIN: HCPCS | Performed by: DERMATOLOGY

## 2019-09-30 PROCEDURE — G8417 CALC BMI ABV UP PARAM F/U: HCPCS | Performed by: DERMATOLOGY

## 2019-09-30 PROCEDURE — 99213 OFFICE O/P EST LOW 20 MIN: CPT | Performed by: DERMATOLOGY

## 2019-09-30 RX ORDER — KETOCONAZOLE 20 MG/ML
SHAMPOO TOPICAL
Qty: 120 ML | Refills: 2 | Status: SHIPPED | OUTPATIENT
Start: 2019-09-30 | End: 2020-10-20 | Stop reason: ALTCHOICE

## 2019-09-30 RX ORDER — TRIAMCINOLONE ACETONIDE 1 MG/G
CREAM TOPICAL
Qty: 80 G | Refills: 5 | Status: SHIPPED | OUTPATIENT
Start: 2019-09-30 | End: 2020-10-20 | Stop reason: ALTCHOICE

## 2019-09-30 NOTE — PROGRESS NOTES
Dermatology Patient Note  [facility] PHYSIC[facility]ANS  [facility] HE[facility] DERMATOLOGY  [address] [address]  Dept: [phone]  Dept Fax: [phone]: 9/30/2019   REFERRING PROVIDER: No ref. provider found      Meryl Palacios is a 34 y.o. female  who presents today in the office for:    Follow-up (scalp is resolved; elbows could be better; never saw rheumatology)      HISTORY OF PRESENT ILLNESS:  Patient presents for f/u psoriasis  Interval history: scalp is improved but still has rash, elbows still bother her. Never saw rheum  Patient assessment of progress: partial and inadequate  Current treatment and adherence: not using triam ointment for elbows bc too greasy. Not using fluocinonide solution for scalp bc it burns. Using ketoconazole shampoo only  Prior treatments tried: see initial HPI      CURRENT MEDICATIONS:   Current Outpatient Medications   Medication Sig Dispense Refill    triamcinolone (KENALOG) 0.1 % cream Apply to rash on body twice daily (avoid face and body folds) 80 g 5    ketoconazole (NIZORAL) 2 % shampoo Apply 3-4 times weekly to scalp, leave on for five minutes prior to washing off 120 mL 2    diclofenac (VOLTAREN) 75 MG EC tablet TAKE 1 TABLET BY MOUTH EVERY 12 HOURS AS NEEDED FOR BODY ACHES  4    rOPINIRole (REQUIP) 0.5 MG tablet TAKE 2 TABLETS BY MOUTH AT BEDTIME  4    traMADol (ULTRAM) 50 MG tablet TAKE 1 TO 2 EVERY 12 HOURS AS NEEDED . DO NOT EXCEED 3 TO 4 TABLETS BY MOUTH PER 24 HOURS FOR SEVERE PAIN ONLY.  NO MORE THAN 10 TABLETS PER  2    medroxyPROGESTERone (DEPO-PROVERA) 150 MG/ML injection  INJECT 1ML (ONE SYRINGE) INTRAMUSCULARLY ONCE FOR ONE DOSE 1 mL 0    ergocalciferol (ERGOCALCIFEROL) 74007 units capsule Take 1 capsule by mouth once a week 4 capsule 3    PARoxetine (PAXIL CR) 12.5 MG extended release tablet Take 1 tablet by mouth daily 30 tablet 3    PARoxetine (PAXIL CR) 37.5 MG extended release tablet Take 1 tablet by mouth daily 30 tablet 3    amitriptyline (ELAVIL) 25 MG tablet Take 25 mg by mouth nightly      LYRICA 50 MG capsule        No current facility-administered medications for this visit. ALLERGIES:   Allergies   Allergen Reactions    Cat Hair Extract     Dust Mite Extract     Molds & Smuts     Seasonal        SOCIAL HISTORY:  Social History     Tobacco Use    Smoking status: Former Smoker     Last attempt to quit: 1/1/2016     Years since quitting: 3.7    Smokeless tobacco: Never Used   Substance Use Topics    Alcohol use: No     Alcohol/week: 0.0 standard drinks       REVIEW OF SYSTEMS:  Review of Systems  Skin: Denies any new changing, growing orbleeding lesions or rashes except as described in the HPI   Constitutional: Denies fevers, chills, and malaise. PHYSICAL EXAM:   /75 (Site: Left Upper Arm, Position: Sitting, Cuff Size: Large Adult)   Pulse 83   Ht 5' 2\" (1.575 m)   Wt 237 lb (107.5 kg)   LMP  (LMP Unknown)   SpO2 96%   BMI 43.35 kg/m²     General Exam:  General Appearance: No acute distress, Well nourished     Neuro: Alert and oriented to person, place and time  Psych: Normal affect   Lymph Node: Not performed    Cutaneous Exam: Performed as documented in clinic note below. Sun-exposed skin, which includes the head/face, neck, both arms, digits and/or nails was examined. Pertinent Physical Exam Findings:  Physical Exam  Psoriatic plaques of occiput and elbows    Photo surveillance performed: No    Medical Necessity of Exam Performed:   Distribution of patient concerns    Additional Diagnostic Testing performed during exam: Not performed ,  Not performed    ASSESSMENT:   Diagnosis Orders   1. Sebopsoriasis  triamcinolone (KENALOG) 0.1 % cream    ketoconazole (NIZORAL) 2 % shampoo       Plan of Action is as Follows:  Assessment   1. Sebopsoriasis, 5% BSA  -Patient not interested in topicals at this time  -You may try using the Triamcinolone cream to the scalp at night.  Wash out in the

## 2019-10-21 RX ORDER — PAROXETINE HYDROCHLORIDE 37.5 MG/1
TABLET, FILM COATED, EXTENDED RELEASE ORAL
Qty: 30 TABLET | Refills: 3 | Status: SHIPPED | OUTPATIENT
Start: 2019-10-21 | End: 2020-03-04

## 2019-11-05 DIAGNOSIS — E55.9 VITAMIN D DEFICIENCY: ICD-10-CM

## 2019-11-06 RX ORDER — ERGOCALCIFEROL 1.25 MG/1
CAPSULE ORAL
Qty: 4 CAPSULE | Refills: 3 | Status: SHIPPED | OUTPATIENT
Start: 2019-11-06 | End: 2020-10-20 | Stop reason: ALTCHOICE

## 2019-11-30 DIAGNOSIS — Z30.013 ENCOUNTER FOR INITIAL PRESCRIPTION OF INJECTABLE CONTRACEPTIVE: ICD-10-CM

## 2019-12-02 RX ORDER — MEDROXYPROGESTERONE ACETATE 150 MG/ML
INJECTION, SUSPENSION INTRAMUSCULAR
Qty: 1 ML | Refills: 0 | Status: SHIPPED | OUTPATIENT
Start: 2019-12-02 | End: 2020-02-21 | Stop reason: SDUPTHER

## 2019-12-06 ENCOUNTER — NURSE ONLY (OUTPATIENT)
Dept: OBGYN CLINIC | Age: 29
End: 2019-12-06
Payer: MEDICARE

## 2019-12-06 VITALS — DIASTOLIC BLOOD PRESSURE: 80 MMHG | WEIGHT: 244 LBS | BODY MASS INDEX: 44.63 KG/M2 | SYSTOLIC BLOOD PRESSURE: 106 MMHG

## 2019-12-06 DIAGNOSIS — N94.6 DYSMENORRHEA: Primary | ICD-10-CM

## 2019-12-06 PROCEDURE — 96372 THER/PROPH/DIAG INJ SC/IM: CPT | Performed by: ADVANCED PRACTICE MIDWIFE

## 2019-12-06 RX ORDER — MEDROXYPROGESTERONE ACETATE 150 MG/ML
150 INJECTION, SUSPENSION INTRAMUSCULAR ONCE
Status: COMPLETED | OUTPATIENT
Start: 2019-12-06 | End: 2019-12-06

## 2019-12-06 RX ADMIN — MEDROXYPROGESTERONE ACETATE 150 MG: 150 INJECTION, SUSPENSION INTRAMUSCULAR at 08:49

## 2020-02-21 ENCOUNTER — TELEPHONE (OUTPATIENT)
Dept: OBGYN CLINIC | Age: 30
End: 2020-02-21

## 2020-02-21 RX ORDER — MEDROXYPROGESTERONE ACETATE 150 MG/ML
INJECTION, SUSPENSION INTRAMUSCULAR
Qty: 1 ML | Refills: 2 | Status: SHIPPED | OUTPATIENT
Start: 2020-02-21 | End: 2020-10-12

## 2020-02-24 ENCOUNTER — NURSE ONLY (OUTPATIENT)
Dept: OBGYN CLINIC | Age: 30
End: 2020-02-24
Payer: MEDICARE

## 2020-02-24 VITALS
HEART RATE: 68 BPM | SYSTOLIC BLOOD PRESSURE: 108 MMHG | WEIGHT: 244 LBS | BODY MASS INDEX: 44.63 KG/M2 | DIASTOLIC BLOOD PRESSURE: 74 MMHG

## 2020-02-24 PROCEDURE — 96372 THER/PROPH/DIAG INJ SC/IM: CPT | Performed by: ADVANCED PRACTICE MIDWIFE

## 2020-02-24 RX ORDER — MEDROXYPROGESTERONE ACETATE 150 MG/ML
150 INJECTION, SUSPENSION INTRAMUSCULAR ONCE
Status: COMPLETED | OUTPATIENT
Start: 2020-02-24 | End: 2020-02-24

## 2020-02-24 RX ADMIN — MEDROXYPROGESTERONE ACETATE 150 MG: 150 INJECTION, SUSPENSION INTRAMUSCULAR at 12:59

## 2020-02-24 NOTE — PROGRESS NOTES
After obtaining consent, and per orders of GUMARO Garza, CNM injection of Medroxyprogesterone 150mg given in right dorsogluteal by Luis Ramos. Patient instructed to remain in clinic for 20 minutes afterwards, and to report any adverse reaction to me immediately.

## 2020-03-04 ENCOUNTER — OFFICE VISIT (OUTPATIENT)
Dept: FAMILY MEDICINE CLINIC | Age: 30
End: 2020-03-04
Payer: MEDICARE

## 2020-03-04 VITALS
HEART RATE: 62 BPM | WEIGHT: 247 LBS | OXYGEN SATURATION: 98 % | DIASTOLIC BLOOD PRESSURE: 70 MMHG | BODY MASS INDEX: 45.18 KG/M2 | SYSTOLIC BLOOD PRESSURE: 98 MMHG | TEMPERATURE: 97.6 F

## 2020-03-04 PROCEDURE — G8427 DOCREV CUR MEDS BY ELIG CLIN: HCPCS | Performed by: FAMILY MEDICINE

## 2020-03-04 PROCEDURE — G8431 POS CLIN DEPRES SCRN F/U DOC: HCPCS | Performed by: FAMILY MEDICINE

## 2020-03-04 PROCEDURE — G8484 FLU IMMUNIZE NO ADMIN: HCPCS | Performed by: FAMILY MEDICINE

## 2020-03-04 PROCEDURE — 99214 OFFICE O/P EST MOD 30 MIN: CPT | Performed by: FAMILY MEDICINE

## 2020-03-04 PROCEDURE — 1036F TOBACCO NON-USER: CPT | Performed by: FAMILY MEDICINE

## 2020-03-04 PROCEDURE — G8417 CALC BMI ABV UP PARAM F/U: HCPCS | Performed by: FAMILY MEDICINE

## 2020-03-04 ASSESSMENT — PATIENT HEALTH QUESTIONNAIRE - PHQ9
1. LITTLE INTEREST OR PLEASURE IN DOING THINGS: 2
10. IF YOU CHECKED OFF ANY PROBLEMS, HOW DIFFICULT HAVE THESE PROBLEMS MADE IT FOR YOU TO DO YOUR WORK, TAKE CARE OF THINGS AT HOME, OR GET ALONG WITH OTHER PEOPLE: 1
5. POOR APPETITE OR OVEREATING: 2
SUM OF ALL RESPONSES TO PHQ QUESTIONS 1-9: 10
7. TROUBLE CONCENTRATING ON THINGS, SUCH AS READING THE NEWSPAPER OR WATCHING TELEVISION: 0
SUM OF ALL RESPONSES TO PHQ QUESTIONS 1-9: 10
4. FEELING TIRED OR HAVING LITTLE ENERGY: 2
6. FEELING BAD ABOUT YOURSELF - OR THAT YOU ARE A FAILURE OR HAVE LET YOURSELF OR YOUR FAMILY DOWN: 0
3. TROUBLE FALLING OR STAYING ASLEEP: 3
2. FEELING DOWN, DEPRESSED OR HOPELESS: 1
SUM OF ALL RESPONSES TO PHQ9 QUESTIONS 1 & 2: 3
9. THOUGHTS THAT YOU WOULD BE BETTER OFF DEAD, OR OF HURTING YOURSELF: 0
8. MOVING OR SPEAKING SO SLOWLY THAT OTHER PEOPLE COULD HAVE NOTICED. OR THE OPPOSITE, BEING SO FIGETY OR RESTLESS THAT YOU HAVE BEEN MOVING AROUND A LOT MORE THAN USUAL: 0

## 2020-03-04 NOTE — PROGRESS NOTES
General FM note    Karyn Herrera is a 34 y.o. female who presents today for follow up on her  medical conditions as noted below.   Karyn Herrera is c/o of   Chief Complaint   Patient presents with    Abdominal Pain       Patient Active Problem List:     Irregular bleeding     Back pain, chronic     Chronic bilateral low back pain without sciatica     Encounter for Nexplanon removal     Encounter for initial prescription of injectable contraceptive     Past Medical History:   Diagnosis Date    ADHD     Anxiety     Brain injury ([address]     Depression     Hearing loss     Learning disability     Migraine headache       Past Surgical History:   Procedure Laterality Date    TONSILLECTOMY      WISDOM TOOTH EXTRACTION       Family History   Problem Relation Age of Onset    Diabetes Mother     Heart Disease Father     Diabetes Father     Diabetes Maternal Grandmother     Diabetes Maternal Grandfather     Diabetes Paternal Grandmother     Diabetes Paternal Grandfather     Other Sister         sepsis in blood stream and pneumonia    Alcohol Abuse Brother     Drug Abuse Brother     Alcohol Abuse Brother     Drug Abuse Brother      Current Outpatient Medications   Medication Sig Dispense Refill    medroxyPROGESTERone (DEPO-PROVERA) 150 MG/ML injection INJECT 1 ML (ONE SYRINGE) INTRAMUSCULARLY ONCE FOR ONE DOSE 1 mL 2    vitamin D (ERGOCALCIFEROL) 1.25 MG (36979 UT) CAPS capsule TAKE 1 CAPSULE BY MOUTH ONCE A WEEK 4 capsule 3    triamcinolone (KENALOG) 0.1 % cream Apply to rash on body twice daily (avoid face and body folds) 80 g 5    ketoconazole (NIZORAL) 2 % shampoo Apply 3-4 times weekly to scalp, leave on for five minutes prior to washing off 120 mL 2    diclofenac (VOLTAREN) 75 MG EC tablet TAKE 1 TABLET BY MOUTH EVERY 12 HOURS AS NEEDED FOR BODY ACHES  4    rOPINIRole (REQUIP) 0.5 MG tablet TAKE 2 TABLETS BY MOUTH AT BEDTIME  4    traMADol (ULTRAM) 50 MG tablet TAKE 1 TO 2 EVERY 12 HOURS AS Neurological: Negative for dizziness and headaches. Psychiatric/Behavioral: Negative for confusion and agitation. Objective:   Physical Exam  Constitutional: VS (see above). General appearance: normal development, habitus and attention, no deformities. No distress. Eyes: normal conjunctiva and lids. CAV: RRR, no RMG. No edema lower extremities. Pulmo: CTA bilateral, no CWR. Skin: no rashes, lesions or ulcers. Abdomen: Bowel sounds positive. There is some slight discomfort to palpitation of right upper quadrant. No guarding no rebounding. Musculoskeletal: normal gait. Nails: no clubbing or cyanosis. Psychiatric: alert and oriented to place, time and person. Normal mood and affect. Assessment:       Diagnosis Orders   1. RUQ pain  US GALLBLADDER RUQ    US Liver    XR ABDOMEN (2 VIEWS)   2. Positive depression screening  Positive Screen for Clinical Depression with a Documented Follow-up Plan    3. Morbid obesity with BMI of 45.0-49.9, adult [facility])         Plan:   Await results of studies. If needed we will send to specialist.  Patient will continue to follow-up with the psychiatrist.  I discussed with patient again diet and exercise. Discussed with her comorbidities which could occur when getting older and having this weight. Call or return to clinic prn if these symptoms worsen or fail to improve as anticipated. I have reviewed the instructions with the patient, answering all questions to her satisfaction. Return in about 6 months (around 9/4/2020), or if symptoms worsen or fail to improve, for [facility]   Orders Placed This Encounter   Procedures    US GALLBLADDER RUQ     Standing Status:   Future     Standing Expiration Date:   3/4/2021    US Liver     Standing Status:   Future     Standing Expiration Date:   3/5/2021    XR ABDOMEN (2 VIEWS)     Standing Status:   Future     Standing Expiration Date:   3/4/2021    Positive Screen for Clinical Depression with a Documented Follow-up Plan

## 2020-05-12 ENCOUNTER — NURSE ONLY (OUTPATIENT)
Dept: OBGYN CLINIC | Age: 30
End: 2020-05-12
Payer: MEDICARE

## 2020-05-12 VITALS
SYSTOLIC BLOOD PRESSURE: 105 MMHG | HEART RATE: 83 BPM | DIASTOLIC BLOOD PRESSURE: 71 MMHG | BODY MASS INDEX: 45.82 KG/M2 | WEIGHT: 250.5 LBS

## 2020-05-12 PROCEDURE — 96372 THER/PROPH/DIAG INJ SC/IM: CPT | Performed by: ADVANCED PRACTICE MIDWIFE

## 2020-05-12 RX ORDER — MEDROXYPROGESTERONE ACETATE 150 MG/ML
150 INJECTION, SUSPENSION INTRAMUSCULAR ONCE
Status: COMPLETED | OUTPATIENT
Start: 2020-05-12 | End: 2020-05-12

## 2020-05-12 RX ADMIN — MEDROXYPROGESTERONE ACETATE 150 MG: 150 INJECTION, SUSPENSION INTRAMUSCULAR at 11:52

## 2020-07-28 ENCOUNTER — NURSE ONLY (OUTPATIENT)
Dept: OBGYN CLINIC | Age: 30
End: 2020-07-28
Payer: MEDICARE

## 2020-07-28 VITALS — TEMPERATURE: 97.3 F | DIASTOLIC BLOOD PRESSURE: 74 MMHG | HEART RATE: 86 BPM | SYSTOLIC BLOOD PRESSURE: 110 MMHG

## 2020-07-28 PROCEDURE — 96372 THER/PROPH/DIAG INJ SC/IM: CPT | Performed by: ADVANCED PRACTICE MIDWIFE

## 2020-07-28 RX ORDER — MEDROXYPROGESTERONE ACETATE 150 MG/ML
150 INJECTION, SUSPENSION INTRAMUSCULAR ONCE
Status: COMPLETED | OUTPATIENT
Start: 2020-07-28 | End: 2020-07-28

## 2020-07-28 RX ADMIN — MEDROXYPROGESTERONE ACETATE 150 MG: 150 INJECTION, SUSPENSION INTRAMUSCULAR at 13:11

## 2020-07-28 NOTE — PROGRESS NOTES
After obtaining consent, and per orders of MARIVEL Hodgson CNM, injection of medroxyprogesterone 150mg given in Left dorsoglute by Josi Valle RN. Patient instructed to remain in clinic for 20 minutes afterwards, and to report any adverse reaction to me immediately.

## 2020-08-31 ENCOUNTER — OFFICE VISIT (OUTPATIENT)
Dept: PODIATRY | Age: 30
End: 2020-08-31
Payer: MEDICARE

## 2020-08-31 VITALS — TEMPERATURE: 98 F

## 2020-08-31 PROCEDURE — 17110 DESTRUCTION B9 LES UP TO 14: CPT | Performed by: PODIATRIST

## 2020-08-31 PROCEDURE — 1036F TOBACCO NON-USER: CPT | Performed by: PODIATRIST

## 2020-08-31 PROCEDURE — G8427 DOCREV CUR MEDS BY ELIG CLIN: HCPCS | Performed by: PODIATRIST

## 2020-08-31 PROCEDURE — 99203 OFFICE O/P NEW LOW 30 MIN: CPT | Performed by: PODIATRIST

## 2020-08-31 PROCEDURE — G8417 CALC BMI ABV UP PARAM F/U: HCPCS | Performed by: PODIATRIST

## 2020-08-31 NOTE — PROGRESS NOTES
[facility] PHYSICIANS  [facility]  [address] [place] [address]  Dept: [phone]  Dept Fax: [phone]    NEW PATIENT PROGRESS NOTE  Date of patient's visit: 8/31/2020  Patient's Name:  Aleshia Lara YOB: 1990            Patient Care Team:  Janace Epley, MD as PCP - General (Family Medicine)  Janace Epley, MD as PCP - [facility] Empaneled Provider  Mark Warren MD as Consulting Physician (Obstetrics & Gynecology)  Latoya Cameron DPM as Physician (Podiatry)        Chief Complaint   Patient presents with    New Patient    Benign Neoplasm         HPI:   Aleshia Lara is a 31 y.o. female who presents to the office today complaining of  Wart to left foot. And growth between 4th and 5th digits right foot  Symptoms began 1 week(s) ago. Patient relates pain is Present. Pain is rated 5 out of 10 and is described as intermittent. Treatments prior to today's visit include: none. Currently denies F/C/N/V. Pt's primary care physician is Janace Epley, MD last seen 5/12/20     Allergies   Allergen Reactions    Cat Hair Extract     Dust Mite Extract     Molds & Smuts     Seasonal        Past Medical History:   Diagnosis Date    ADHD     Anxiety     [address] injury [address] [address]     Depression     Hearing loss     Learning disability     Migraine headache        Prior to Admission medications    Medication Sig Start Date End Date Taking? Authorizing Provider   DULoxetine HCl (CYMBALTA PO) Take 20 mg by mouth daily Indications: pt uncertain re: dosing.    Yes Historical Provider, MD   medroxyPROGESTERone (DEPO-PROVERA) 150 MG/ML injection INJECT 1 ML (ONE SYRINGE) INTRAMUSCULARLY ONCE FOR ONE DOSE 2/21/20  Yes FUNMILAYO Nielsen CNM   vitamin D (ERGOCALCIFEROL) 1.25 MG (54028 UT) CAPS capsule TAKE 1 CAPSULE BY MOUTH ONCE A WEEK 11/6/19  Yes Janace Epley, MD   triamcinolone (KENALOG) 0.1 % cream Apply to rash on body twice daily (avoid face and body folds) 19  Yes Larry Weathers MD   ketoconazole (NIZORAL) 2 % shampoo Apply 3-4 times weekly to scalp, leave on for five minutes prior to washing off 19  Yes Larry Weathers MD   diclofenac (VOLTAREN) 75 MG EC tablet TAKE 1 TABLET BY MOUTH EVERY 12 HOURS AS NEEDED FOR BODY ACHES 19  Yes Historical Provider, MD   rOPINIRole (REQUIP) 0.5 MG tablet TAKE 2 TABLETS BY MOUTH AT BEDTIME 19  Yes Historical Provider, MD   traMADol (ULTRAM) 50 MG tablet TAKE 1 TO 2 EVERY 12 HOURS AS NEEDED . DO NOT EXCEED 3 TO 4 TABLETS BY MOUTH PER 24 HOURS FOR SEVERE PAIN ONLY. NO MORE THAN 10 TABLETS PER 19  Yes Historical Provider, MD   amitriptyline (ELAVIL) 25 MG tablet Take 25 mg by mouth nightly   Yes Historical Provider, MD   [facility] 50 MG capsule  18  Yes Historical Provider, MD       Past Surgical History:   Procedure Laterality Date    TONSILLECTOMY      WISDOM TOOTH EXTRACTION         Family History   Problem Relation Age of Onset    Diabetes Mother     Heart Disease Father     Diabetes Father     Diabetes Maternal Grandmother     Diabetes Maternal Grandfather     Diabetes Paternal Grandmother     Diabetes Paternal Grandfather     Other Sister         sepsis in blood stream and pneumonia    Alcohol Abuse Brother     Drug Abuse Brother     Alcohol Abuse Brother     Drug Abuse Brother        Social History     Tobacco Use    Smoking status: Former Smoker     Last attempt to quit: 2016     Years since quittin.6    Smokeless tobacco: Never Used   Substance Use Topics    Alcohol use: No     Alcohol/week: 0.0 standard drinks       Review of Systems    Review of Systems:   History obtained from chart review and the patient  General ROS: negative for - chills, fatigue, fever, night sweats or weight gain  Constitutional: Negative for chills, diaphoresis, fatigue, fever and unexpected weight change. Musculoskeletal: Positive for arthralgias, gait problem and joint swelling.   Neurological ROS: negative for - behavioral changes, confusion, headaches or seizures. Negative for weakness and numbness. Dermatological ROS: negative for - mole changes, rash  Cardiovascular: Negative for leg swelling. Gastrointestinal: Negative for constipation, diarrhea, nausea and vomiting. Lower Extremity Physical Examination:   Vitals:   Vitals:    08/31/20 0803   Temp: 98 °F (36.7 °C)     General: AAO x 3 in NAD. Dermatologic Exam:  Soft tissue lesion to the plantar right 4th digit and left foot sub 5th MTH with central core and petechiae. Pain on palpation of lesion. Musculoskeletal:     1st MPJ ROM decreased, Bilateral.  Muscle strength 5/5, Bilateral.  Pain present upon palpation of skin lesion. Medial longitudinal arch, Bilateral WNL. Ankle ROM WNL,Bilateral.    Dorsally contracted digits absent digits 1-5 Bilateral.     Vascular: DP and PT pulses palpable 2/4, Bilateral.  CFT <3 seconds, Bilateral.  Hair growth present to the level of the digits, Bilateral.  Edema absent, Bilateral.  Varicosities absent, Bilateral. Erythema absent, Bilateral    Neurological: Sensation intact to light touch to level of digits, Bilateral.  Protective sensation intact 10/10 sites via 5.07/10g [place] Monofilament, Bilateral.  negative Tinel's, Bilateral.  negative Valleix sign, Bilateral.      Integument: Warm, dry, supple, Bilateral.  Open lesion absent, Bilateral.  Interdigital maceration absent to web spaces 1-4, Bilateral.  Nails are normal in length, thickness and color 1-5 bilateral.  Fissures absent, Bilateral.       Asessment: Patient is a 31 y.o. female with:    Diagnosis Orders   1. Benign neoplasm of skin of right foot  67246 - [place] DESTRUCTION BENIGN LESIONS UP TO 14   2. 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 Current condition and treatment options discussed in detail.   Discussed conservative and surgical options with the patient. The lesion was partially excised and salinocain acid was applied under occlusion. The patient will leave in place for 24-48 hours and than remove. The patient tolerated the procedure well and without complication. Verbal and written instructions given to patient. Contact office with any questions/problems/concerns. RTC in 2week(s).     8/31/2020    Electronically signed by Anastasia Denis DPM on 8/31/2020 at 8:09 AM  8/31/2020

## 2020-09-28 ENCOUNTER — HOSPITAL ENCOUNTER (OUTPATIENT)
Age: 30
Setting detail: SPECIMEN
Discharge: HOME | End: 2020-09-28
Payer: MEDICARE

## 2020-09-28 ENCOUNTER — OFFICE VISIT (OUTPATIENT)
Dept: FAMILY MEDICINE CLINIC | Age: 30
End: 2020-09-28
Payer: MEDICARE

## 2020-09-28 VITALS
WEIGHT: 238.4 LBS | TEMPERATURE: 96.7 F | BODY MASS INDEX: 43.6 KG/M2 | HEART RATE: 94 BPM | SYSTOLIC BLOOD PRESSURE: 108 MMHG | DIASTOLIC BLOOD PRESSURE: 72 MMHG | OXYGEN SATURATION: 99 %

## 2020-09-28 LAB
ABSOLUTE EOS #: 0.28 K/UL (ref 0–0.44)
ABSOLUTE IMMATURE GRANULOCYTE: 0.05 K/UL (ref 0–0.3)
ABSOLUTE LYMPH #: 2.68 K/UL (ref 1.1–3.7)
ABSOLUTE MONO #: 0.85 K/UL (ref 0.1–1.2)
ALBUMIN SERPL-MCNC: 4.3 G/DL (ref 3.5–5.2)
ALBUMIN/GLOBULIN RATIO: 1.5 (ref 1–2.5)
ALP BLD-CCNC: 69 U/L (ref 35–104)
ALT SERPL-CCNC: 22 U/L (ref 5–33)
ANION GAP SERPL CALCULATED.3IONS-SCNC: 13 MMOL/L (ref 9–17)
AST SERPL-CCNC: 16 U/L
BASOPHILS # BLD: 1 % (ref 0–2)
BASOPHILS ABSOLUTE: 0.11 K/UL (ref 0–0.2)
BILIRUB SERPL-MCNC: <0.1 MG/DL (ref 0.3–1.2)
BUN BLDV-MCNC: 13 MG/DL (ref 6–20)
BUN/CREAT BLD: ABNORMAL (ref 9–20)
CALCIUM SERPL-MCNC: 9.2 MG/DL (ref 8.6–10.4)
CHLORIDE BLD-SCNC: 106 MMOL/L (ref 98–107)
CO2: 21 MMOL/L (ref 20–31)
CREAT SERPL-MCNC: 0.74 MG/DL (ref 0.5–0.9)
DIFFERENTIAL TYPE: ABNORMAL
EOSINOPHILS RELATIVE PERCENT: 3 % (ref 1–4)
GFR AFRICAN AMERICAN: >60 ML/MIN
GFR NON-AFRICAN AMERICAN: >60 ML/MIN
GFR SERPL CREATININE-BSD FRML MDRD: ABNORMAL ML/MIN/{1.73_M2}
GFR SERPL CREATININE-BSD FRML MDRD: ABNORMAL ML/MIN/{1.73_M2}
GLUCOSE BLD-MCNC: 90 MG/DL (ref 70–99)
HCT VFR BLD CALC: 46.8 % (ref 36.3–47.1)
HEMOGLOBIN: 14.6 G/DL (ref 11.9–15.1)
IMMATURE GRANULOCYTES: 1 %
LYMPHOCYTES # BLD: 24 % (ref 24–43)
MCH RBC QN AUTO: 25.7 PG (ref 25.2–33.5)
MCHC RBC AUTO-ENTMCNC: 31.2 G/DL (ref 28.4–34.8)
MCV RBC AUTO: 82.2 FL (ref 82.6–102.9)
MONOCYTES # BLD: 8 % (ref 3–12)
NRBC AUTOMATED: 0 PER 100 WBC
PDW BLD-RTO: 14.2 % (ref 11.8–14.4)
PLATELET # BLD: 409 K/UL (ref 138–453)
PLATELET ESTIMATE: ABNORMAL
PMV BLD AUTO: 10.4 FL (ref 8.1–13.5)
POTASSIUM SERPL-SCNC: 4.1 MMOL/L (ref 3.7–5.3)
RBC # BLD: 5.69 M/UL (ref 3.95–5.11)
RBC # BLD: ABNORMAL 10*6/UL
SEG NEUTROPHILS: 63 % (ref 36–65)
SEGMENTED NEUTROPHILS ABSOLUTE COUNT: 7 K/UL (ref 1.5–8.1)
SODIUM BLD-SCNC: 140 MMOL/L (ref 135–144)
TOTAL PROTEIN: 7.2 G/DL (ref 6.4–8.3)
TSH SERPL DL<=0.05 MIU/L-ACNC: 1.52 MIU/L (ref 0.3–5)
WBC # BLD: 11 K/UL (ref 3.5–11.3)
WBC # BLD: ABNORMAL 10*3/UL

## 2020-09-28 PROCEDURE — 99213 OFFICE O/P EST LOW 20 MIN: CPT | Performed by: FAMILY MEDICINE

## 2020-09-28 PROCEDURE — 1036F TOBACCO NON-USER: CPT | Performed by: FAMILY MEDICINE

## 2020-09-28 PROCEDURE — G8427 DOCREV CUR MEDS BY ELIG CLIN: HCPCS | Performed by: FAMILY MEDICINE

## 2020-09-28 PROCEDURE — G8417 CALC BMI ABV UP PARAM F/U: HCPCS | Performed by: FAMILY MEDICINE

## 2020-09-28 RX ORDER — FLUOXETINE 20 MG/1
20 TABLET, FILM COATED ORAL DAILY
Status: ON HOLD | COMMUNITY
Start: 2020-09-25 | End: 2020-12-14 | Stop reason: ALTCHOICE

## 2020-09-28 SDOH — ECONOMIC STABILITY: FOOD INSECURITY: WITHIN THE PAST 12 MONTHS, THE FOOD YOU BOUGHT JUST DIDN'T LAST AND YOU DIDN'T HAVE MONEY TO GET MORE.: NEVER TRUE

## 2020-09-28 SDOH — ECONOMIC STABILITY: INCOME INSECURITY: HOW HARD IS IT FOR YOU TO PAY FOR THE VERY BASICS LIKE FOOD, HOUSING, MEDICAL CARE, AND HEATING?: NOT HARD AT ALL

## 2020-09-28 SDOH — ECONOMIC STABILITY: FOOD INSECURITY: WITHIN THE PAST 12 MONTHS, YOU WORRIED THAT YOUR FOOD WOULD RUN OUT BEFORE YOU GOT MONEY TO BUY MORE.: NEVER TRUE

## 2020-09-28 ASSESSMENT — PATIENT HEALTH QUESTIONNAIRE - PHQ9
2. FEELING DOWN, DEPRESSED OR HOPELESS: 2
SUM OF ALL RESPONSES TO PHQ QUESTIONS 1-9: 2
SUM OF ALL RESPONSES TO PHQ9 QUESTIONS 1 & 2: 2
SUM OF ALL RESPONSES TO PHQ QUESTIONS 1-9: 2
1. LITTLE INTEREST OR PLEASURE IN DOING THINGS: 0

## 2020-09-28 NOTE — PROGRESS NOTES
General FM note    Myriam Valentine is a 31 y.o. female who presents today for follow up on her  medical conditions as noted below. Myriam Valentine is c/o of   Chief Complaint   Patient presents with    Back Pain       Patient Active Problem List:     Irregular bleeding     Back pain, chronic     Chronic bilateral low back pain without sciatica     Encounter for Nexplanon removal     Encounter for initial prescription of injectable contraceptive     Past Medical History:   Diagnosis Date    ADHD     Anxiety     Brain injury ([address]     Depression     Hearing loss     Learning disability     Migraine headache       Past Surgical History:   Procedure Laterality Date    TONSILLECTOMY      WISDOM TOOTH EXTRACTION       Family History   Problem Relation Age of Onset    Diabetes Mother     Heart Disease Father     Diabetes Father     Diabetes Maternal Grandmother     Diabetes Maternal Grandfather     Diabetes Paternal Grandmother     Diabetes Paternal Grandfather     Other Sister         sepsis in blood stream and pneumonia    Alcohol Abuse Brother     Drug Abuse Brother     Alcohol Abuse Brother     Drug Abuse Brother      Current Outpatient Medications   Medication Sig Dispense Refill    FLUoxetine (PROZAC) 20 MG tablet Take 20 mg by mouth daily      DULoxetine HCl (CYMBALTA PO) Take 20 mg by mouth daily Indications: pt uncertain re: dosing.       medroxyPROGESTERone (DEPO-PROVERA) 150 MG/ML injection INJECT 1 ML (ONE SYRINGE) INTRAMUSCULARLY ONCE FOR ONE DOSE 1 mL 2    amitriptyline (ELAVIL) 25 MG tablet Take 25 mg by mouth nightly      vitamin D (ERGOCALCIFEROL) 1.25 MG (29418 UT) CAPS capsule TAKE 1 CAPSULE BY MOUTH ONCE A WEEK (Patient not taking: Reported on 9/28/2020) 4 capsule 3    triamcinolone (KENALOG) 0.1 % cream Apply to rash on body twice daily (avoid face and body folds) (Patient not taking: Reported on 9/28/2020) 80 g 5    ketoconazole (NIZORAL) 2 % shampoo Apply 3-4 times weekly to scalp, leave on for five minutes prior to washing off (Patient not taking: Reported on 2020) 120 mL 2    diclofenac (VOLTAREN) 75 MG EC tablet TAKE 1 TABLET BY MOUTH EVERY 12 HOURS AS NEEDED FOR BODY ACHES  4    rOPINIRole (REQUIP) 0.5 MG tablet TAKE 2 TABLETS BY MOUTH AT BEDTIME  4    traMADol (ULTRAM) 50 MG tablet TAKE 1 TO 2 EVERY 12 HOURS AS NEEDED . DO NOT EXCEED 3 TO 4 TABLETS BY MOUTH PER 24 HOURS FOR SEVERE PAIN ONLY. NO MORE THAN 10 TABLETS PER  2    LYRICA 50 MG capsule        No current facility-administered medications for this visit. ALLERGIES:    Allergies   Allergen Reactions    Cat Hair Extract     Dust Mite Extract     Molds & Smuts     Seasonal        Social History     Tobacco Use    Smoking status: Former Smoker     Last attempt to quit: 2016     Years since quittin.7    Smokeless tobacco: Never Used   Substance Use Topics    Alcohol use: No     Alcohol/week: 0.0 standard drinks      Body mass index is 43.6 kg/m². /72   Pulse 94   Temp 96.7 °F (35.9 °C)   Wt 238 lb 6.4 oz (108.1 kg)   SpO2 99%   BMI 43.60 kg/m²     Subjective:      HPI    32 yo female coming in today with her boyfriend because of continuing back pains. She would like to get a referral to a specialist.  Back pain for over 2 years. In spine area more so certain ounce but for the fibromyalgia is in her whole back -- was diagnosed in 2018. Was on meds before - lyrica and duloxetine. tried PT not helping. She does not do any home exercises. She tells me that she walks daily. Review of Systems   Constitutional: Negative for fever and unexpected weight change. Pertinent items are noted in HPI. Objective:   Physical Exam  Constitutional: VS (see above). General appearance: normal development, habitus and attention, no deformities. No distress. Eyes: normal conjunctiva and lids. CAV: RRR, no RMG. No edema lower extremities. Pulmo: CTA bilateral, no CWR.   Skin: no rashes, lesions or ulcers. Musculoskeletal: normal gait. Nails: no clubbing or cyanosis. Psychiatric: alert and oriented to place, time and person. Normal mood and affect. Assessment:       Diagnosis Orders   1. Chronic bilateral back pain, unspecified back location  CBC Auto Differential    Urinalysis    TSH with Reflex    Comprehensive Metabolic Panel    Marichuy Long MD, [facility]   2. BMI 40.0-44.9, adult (HCC)  CBC Auto Differential    Urinalysis    TSH with Reflex    Comprehensive Metabolic Panel    Hemoglobin A1C       Plan:   Patient is morbidly obese. She has a very bad posture. I discussed with her to start home exercises. see the pain specialist for further recommendation. Call or return to clinic prn if these symptoms worsen or fail to improve as anticipated. I have reviewed the instructions with the patient, answering all questions to her and her boyfriend's satisfaction. No follow-ups on file. Orders Placed This Encounter   Procedures    CBC Auto Differential     Standing Status:   Future     Number of Occurrences:   1     Standing Expiration Date:   9/28/2021    Urinalysis     Standing Status:   Future     Standing Expiration Date:   9/28/2021     Order Specific Question:   SPECIFY(EX-CATH,MIDSTREAM,CYSTO,ETC)?      Answer:   midstream    TSH with Reflex     Standing Status:   Future     Number of Occurrences:   1     Standing Expiration Date:   9/29/2021    Comprehensive Metabolic Panel     Standing Status:   Future     Number of Occurrences:   1     Standing Expiration Date:   9/28/2021    Hemoglobin A1C     Standing Status:   Future     Number of Occurrences:   1     Standing Expiration Date:   9/28/2021    Loreta Collado MD, Pain Management, [place]     Referral Priority:   Routine     Referral Type:   Eval and Treat     Referral Reason:   Specialty Services Required     Referred to Provider:   Gabe Lennox, MD     Requested Specialty:   Pain

## 2020-09-29 LAB
-: ABNORMAL
AMORPHOUS: ABNORMAL
BACTERIA: ABNORMAL
BILIRUBIN URINE: NEGATIVE
CASTS UA: ABNORMAL /LPF (ref 0–8)
COLOR: YELLOW
COMMENT UA: ABNORMAL
CRYSTALS, UA: ABNORMAL /HPF
EPITHELIAL CELLS UA: ABNORMAL /HPF (ref 0–5)
ESTIMATED AVERAGE GLUCOSE: 128 MG/DL
GLUCOSE URINE: NEGATIVE
HBA1C MFR BLD: 6.1 % (ref 4–6)
KETONES, URINE: NEGATIVE
LEUKOCYTE ESTERASE, URINE: NEGATIVE
MUCUS: ABNORMAL
NITRITE, URINE: NEGATIVE
OTHER OBSERVATIONS UA: ABNORMAL
PH UA: 5.5 (ref 5–8)
PROTEIN UA: NEGATIVE
RBC UA: ABNORMAL /HPF (ref 0–4)
RENAL EPITHELIAL, UA: ABNORMAL /HPF
SPECIFIC GRAVITY UA: 1.02 (ref 1–1.03)
TRICHOMONAS: ABNORMAL
TURBIDITY: ABNORMAL
URINE HGB: NEGATIVE
UROBILINOGEN, URINE: NORMAL
WBC UA: ABNORMAL /HPF (ref 0–5)
YEAST: ABNORMAL

## 2020-10-12 ENCOUNTER — OFFICE VISIT (OUTPATIENT)
Dept: PODIATRY | Age: 30
End: 2020-10-12
Payer: MEDICARE

## 2020-10-12 VITALS — BODY MASS INDEX: 43.79 KG/M2 | TEMPERATURE: 97 F | HEIGHT: 62 IN | RESPIRATION RATE: 16 BRPM | WEIGHT: 238 LBS

## 2020-10-12 PROCEDURE — G8428 CUR MEDS NOT DOCUMENT: HCPCS | Performed by: PODIATRIST

## 2020-10-12 PROCEDURE — 1036F TOBACCO NON-USER: CPT | Performed by: PODIATRIST

## 2020-10-12 PROCEDURE — G8417 CALC BMI ABV UP PARAM F/U: HCPCS | Performed by: PODIATRIST

## 2020-10-12 PROCEDURE — 17110 DESTRUCTION B9 LES UP TO 14: CPT | Performed by: PODIATRIST

## 2020-10-12 PROCEDURE — 99213 OFFICE O/P EST LOW 20 MIN: CPT | Performed by: PODIATRIST

## 2020-10-12 PROCEDURE — G8484 FLU IMMUNIZE NO ADMIN: HCPCS | Performed by: PODIATRIST

## 2020-10-12 RX ORDER — MEDROXYPROGESTERONE ACETATE 150 MG/ML
INJECTION, SUSPENSION INTRAMUSCULAR
Qty: 1 ML | Refills: 0 | Status: SHIPPED | OUTPATIENT
Start: 2020-10-12 | End: 2020-12-28

## 2020-10-12 NOTE — TELEPHONE ENCOUNTER
Alexey Aceves is requesting a refill.    Last Annual visit: 9/10/2019  Last OB visit: N/A  Next OB/Office visit:  10/13/20  Last prescribing provider:  Radha Simons     Has an appointment for tomorrow

## 2020-10-12 NOTE — PROGRESS NOTES
Bilateral.     Vascular: DP and PT pulses palpable 2/4, Bilateral.  CFT <3 seconds, Bilateral.  Hair growth present to the level of the digits, Bilateral.  Edema absent, Bilateral.  Varicosities absent, Bilateral. Erythema absent, Bilateral    Neurological: Sensation intact to light touch to level of digits, Bilateral.  Protective sensation intact 10/10 sites via 5.07/10g [place] Monofilament, Bilateral.  negative Tinel's, Bilateral.  negative Valleix sign, Bilateral.      Integument: Warm, dry, supple, Bilateral.  Open lesion absent, Bilateral.  Interdigital maceration absent to web spaces 1-4, Bilateral.  Nails are normal in length, thickness and color 1-5 bilateral.  Fissures absent, Bilateral.       Asessment: Patient is a 31 y.o. female with:    Diagnosis Orders   1. Benign neoplasm of skin of right foot  43321 - [place] DESTRUCTION BENIGN LESIONS UP TO 14   2. 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 Current condition and treatment options discussed in detail. The lesion was partially excised and Pyrogallic acid was applied under occlusion. The patient will leave in place for 24-48 hours and than remove. The patient tolerated the procedure well and without complication. Verbal and written instructions given to patient. Contact office with any questions/problems/concerns. No orders of the defined types were placed in this encounter. No orders of the defined types were placed in this encounter. RTC in 9week(s).     10/12/2020      Electronically signed by Conor Smith DPM on 10/12/2020 at 10:10 AM  10/12/2020

## 2020-10-13 ENCOUNTER — NURSE ONLY (OUTPATIENT)
Dept: OBGYN CLINIC | Age: 30
End: 2020-10-13
Payer: MEDICARE

## 2020-10-13 VITALS
SYSTOLIC BLOOD PRESSURE: 108 MMHG | DIASTOLIC BLOOD PRESSURE: 70 MMHG | BODY MASS INDEX: 43.36 KG/M2 | HEIGHT: 62 IN | WEIGHT: 235.6 LBS | TEMPERATURE: 98 F

## 2020-10-13 PROCEDURE — 96372 THER/PROPH/DIAG INJ SC/IM: CPT | Performed by: ADVANCED PRACTICE MIDWIFE

## 2020-10-13 RX ORDER — MEDROXYPROGESTERONE ACETATE 150 MG/ML
150 INJECTION, SUSPENSION INTRAMUSCULAR ONCE
Status: COMPLETED | OUTPATIENT
Start: 2020-10-13 | End: 2020-10-13

## 2020-10-13 RX ADMIN — MEDROXYPROGESTERONE ACETATE 150 MG: 150 INJECTION, SUSPENSION INTRAMUSCULAR at 13:36

## 2020-10-13 NOTE — PROGRESS NOTES
After obtaining consent, and per orders of [facility], injection of DEPO 150mg given in RT dorso gluteal  by Cintia Lee. Patient instructed to remain in clinic for 20 minutes afterwards, and to report any adverse reaction to me immediately.     UXL:[id number]  EXP:03/2022  [facility]:[id number]

## 2020-10-20 ENCOUNTER — INITIAL CONSULT (OUTPATIENT)
Dept: PAIN MANAGEMENT | Age: 30
End: 2020-10-20
Payer: MEDICARE

## 2020-10-20 VITALS
OXYGEN SATURATION: 97 % | SYSTOLIC BLOOD PRESSURE: 114 MMHG | WEIGHT: 236 LBS | HEART RATE: 107 BPM | HEIGHT: 63 IN | TEMPERATURE: 96.8 F | DIASTOLIC BLOOD PRESSURE: 82 MMHG | BODY MASS INDEX: 41.82 KG/M2

## 2020-10-20 PROCEDURE — G8427 DOCREV CUR MEDS BY ELIG CLIN: HCPCS | Performed by: ANESTHESIOLOGY

## 2020-10-20 PROCEDURE — 1036F TOBACCO NON-USER: CPT | Performed by: ANESTHESIOLOGY

## 2020-10-20 PROCEDURE — G8484 FLU IMMUNIZE NO ADMIN: HCPCS | Performed by: ANESTHESIOLOGY

## 2020-10-20 PROCEDURE — G8417 CALC BMI ABV UP PARAM F/U: HCPCS | Performed by: ANESTHESIOLOGY

## 2020-10-20 PROCEDURE — 99204 OFFICE O/P NEW MOD 45 MIN: CPT | Performed by: ANESTHESIOLOGY

## 2020-10-20 RX ORDER — BACLOFEN 10 MG/1
10 TABLET ORAL NIGHTLY
Qty: 30 TABLET | Refills: 1 | Status: SHIPPED | OUTPATIENT
Start: 2020-10-20 | End: 2020-11-19

## 2020-10-20 RX ORDER — MELOXICAM 15 MG/1
15 TABLET ORAL DAILY
Qty: 30 TABLET | Refills: 1 | Status: SHIPPED | OUTPATIENT
Start: 2020-10-20 | End: 2020-11-24 | Stop reason: SDUPTHER

## 2020-10-20 ASSESSMENT — ENCOUNTER SYMPTOMS
BACK PAIN: 1
RESPIRATORY NEGATIVE: 1
GASTROINTESTINAL NEGATIVE: 1
EYES NEGATIVE: 1

## 2020-10-20 NOTE — PROGRESS NOTES
The patient is a 31 y. o. Non-/non  female. Chief Complaint   Patient presents with    Back Pain    Consultation        HPI    Requesting physician for the evaluation of Maira Villalba 1990:    Terry García MD    Pain History  -31 year-old woman with history of chronic back pain onset of symptom more than 5 years ago, no particular injury associated with the onset of symptom  Pain is located in the mid and lower back  Worst of her pain is in the lower back  Describe this pain as aching throbbing stabbing sensation  Pain radiates down both legs left side more than right  It radiates over the gluteal region posterior thigh to the knee  Aggravated with standing lifting bending twisting walking  Alleviating factors nothing  Associated symptoms include numbness and tingling predominantly on the left side  No changes in bladder or bowel control  No history of fever chills or weight loss  No previous lumbar spine injections  No previous lumbar spine surgical history  Had an MRI lumbar spine in nearly 2018, report is available in epic  Images views are not available because it was done at an outside facility  Symptoms of progressively been worsening and affecting daily life activities   pain score today  7  1. Location:thoriaci, Lumbar  2. Radiation:bilateral legs  3. Character:aching, throbbing, shooting,sharp  5. Duration:constant  6. Onset: years  7. Did an injury cause pain: no  8. Aggravating factors:sitting, standing, walking, lying down  9. Alleviating factors:nothing  10. Associated symptoms (numbness / tingling / weakness):  no  -Where at:none  -Down into finger tips or toes (specify which finger or toes):n/a  -constant or intermitting: n/a  11. Red Flags: (weight loss / chills / loss of bladder or bowel control):none    Previous management history  1.  Previous diagnostic workup: (Imaging/EMG)   CT, MRI, or Xray: MRI  What part of the body:lumbar, thoracic  What facility did they have it at:[facility]  What year or specific date: 2018  EMG:  no    2. Previous non interventional treatments tried:  chiropractor or physical therapy:Both  What part of the body:back  What facility was it done at: [address]  How long ago was it last tried:week ago for chiropractor, PT last tried one year ago  Did it work: No   Did they complete it:Yes    3. Previous Medications tried  NSAID's: yes  Neurontin: no  Lyrica: yes  Trycyclic antidepressant (Ellavil / Pamelor ): Elavil  Cymbalta: yes  Opioids (Ultram / Vicodin / Percocet / Morphine / Dilaudid / Oramorph/ Fentanyl etc.):Tramadol  Last Pain medication taken (name of med and date): Ibuprofen last taken last week    4. Previous Interventional pain procedures tried:  What kind of injection:n/a  Who did the injection: n/a  did the injection help: n/a  Last time injection was done:N/A    5.  Previous surgeries for pain  What part of the body did they have the surgery:n/a  What physician did the surgery:n.a  What Facility did they have the surgery done[facility]  Date of Surgery:N/A    Social History:  Marital status:Single  Employment History:n/a  Working  No  Full time Or Part time: n/a  Disability  Yes   Legal Issues related to pain complaint: No     Pain Disability Index score : 83    Informant: patient        Past Medical History:   Diagnosis Date    ADHD     Anxiety     Brain injury ([facility] [address]     Depression     Hearing loss     Learning disability     Migraine headache       Past Surgical History:   Procedure Laterality Date    TONSILLECTOMY      WISDOM TOOTH EXTRACTION       Social History     Socioeconomic History    Marital status: Single     Spouse name: None    Number of children: None    Years of education: None    Highest education level: None   Occupational History    None   Social Needs    Financial resource strain: Not hard at all   Dis[employer] insecurity     Worry: Never true     Inability: Never true   [employer] needs     Medical: None     Non-medical: None   Tobacco Use    Smoking status: Former Smoker     Last attempt to quit: 2016     Years since quittin.8    Smokeless tobacco: Never Used   Substance and Sexual Activity    Alcohol use: No     Alcohol/week: 0.0 standard drinks    Drug use: No    Sexual activity: Yes   Lifestyle    Physical activity     Days per week: None     Minutes per session: None    Stress: None   Relationships    Social connections     Talks on phone: None     Gets together: None     Attends Alevism service: None     Active member of club or organization: None     Attends meetings of clubs or organizations: None     Relationship status: None    Intimate partner violence     Fear of current or ex partner: None     Emotionally abused: None     Physically abused: None     Forced sexual activity: None   Other Topics Concern    None   Social History Narrative    None     Family History   Problem Relation Age of Onset    Diabetes Mother     Heart Disease Father     Diabetes Father     Diabetes Maternal Grandmother     Diabetes Maternal Grandfather     Diabetes Paternal Grandmother     Diabetes Paternal Grandfather     Other Sister         sepsis in blood stream and pneumonia    Alcohol Abuse Brother     Drug Abuse Brother     Alcohol Abuse Brother     Drug Abuse Brother      Allergies   Allergen Reactions    Cat Hair Extract     Dust Mite Extract     Molds & Smuts     Seasonal      Cat hair extract; Dust mite extract; Molds & smuts; and Seasonal   Vitals:    10/20/20 1254   BP: 114/82   Pulse: 107   Temp: 96.8 °F (36 °C)   SpO2: 97%     Current Outpatient Medications   Medication Sig Dispense Refill    Cariprazine HCl (VRAYLAR) 6 MG CAPS Take by mouth daily      Elastic Bandages & Supports (LUMBAR BACK BRACE/SUPPORT PAD) MISC 1 Units by Does not apply route daily 1 each 0    meloxicam (MOBIC) 15 MG tablet Take 1 tablet by mouth daily 30 tablet 1    baclofen (LIORESAL) 10 MG tablet Take 1 tablet by mouth nightly 30 tablet 1    medroxyPROGESTERone (DEPO-PROVERA) 150 MG/ML injection INJECT 1  INTRAMUSCULARLY ONCE EVERY 3  MONTH 1 mL 0    FLUoxetine (PROZAC) 20 MG tablet Take 20 mg by mouth daily      DULoxetine HCl (CYMBALTA PO) Take 20 mg by mouth daily Indications: pt uncertain re: dosing.  diclofenac (VOLTAREN) 75 MG EC tablet TAKE 1 TABLET BY MOUTH EVERY 12 HOURS AS NEEDED FOR BODY ACHES  4    rOPINIRole (REQUIP) 0.5 MG tablet TAKE 2 TABLETS BY MOUTH AT BEDTIME  4    traMADol (ULTRAM) 50 MG tablet TAKE 1 TO 2 EVERY 12 HOURS AS NEEDED . DO NOT EXCEED 3 TO 4 TABLETS BY MOUTH PER 24 HOURS FOR SEVERE PAIN ONLY. NO MORE THAN 10 TABLETS PER  2    amitriptyline (ELAVIL) 25 MG tablet Take 25 mg by mouth nightly      LYRICA 50 MG capsule        No current facility-administered medications for this visit. Review of Systems   Constitutional: Negative. Negative for fever. HENT: Negative. Eyes: Negative. Respiratory: Negative. Cardiovascular: Negative. Gastrointestinal: Negative. Endocrine: Negative. Genitourinary: Negative. Musculoskeletal: Positive for arthralgias, back pain, joint swelling, myalgias, neck pain and neck stiffness. Skin: Negative. Allergic/Immunologic: Positive for environmental allergies. Neurological: Negative. Hematological: Negative. Psychiatric/Behavioral: The patient is nervous/anxious. All other systems reviewed and are negative. Objective:  General Appearance:  Uncomfortable, in pain, well-appearing and in no acute distress. Vital signs: (most recent): Blood pressure 114/82, pulse 107, temperature 96.8 °F (36 °C), temperature source Infrared, height 5' 3\" (1.6 m), weight 236 lb (107 kg), SpO2 97 %, not currently breastfeeding. Vital signs are normal.  No fever. Output: Producing urine and producing stool. HEENT: Normal HEENT exam.    Lungs:  Normal effort and normal respiratory rate.   Breath sounds clear to tablet     Refill:  1    baclofen (LIORESAL) 10 MG tablet     Sig: Take 1 tablet by mouth nightly     Dispense:  30 tablet     Refill:  1        CONSULTATION  note sent to the referring physician  Electronically signed by Harleen Galeano MD on 10/20/2020 at 1:42 PM      MRI lumbar spine November 9, 2020  L4-L5 level ligamentum flavum redundancy with posterior facet hypertrophy and posterior disc bulge

## 2020-10-27 ENCOUNTER — TELEPHONE (OUTPATIENT)
Dept: PAIN MANAGEMENT | Age: 30
End: 2020-10-27

## 2020-11-24 ENCOUNTER — OFFICE VISIT (OUTPATIENT)
Dept: PAIN MANAGEMENT | Age: 30
End: 2020-11-24
Payer: MEDICARE

## 2020-11-24 VITALS — WEIGHT: 240 LBS | HEIGHT: 63 IN | TEMPERATURE: 97.5 F | BODY MASS INDEX: 42.52 KG/M2

## 2020-11-24 PROBLEM — M51.26 LUMBAR DISC HERNIATION: Status: ACTIVE | Noted: 2020-11-24

## 2020-11-24 PROCEDURE — G8484 FLU IMMUNIZE NO ADMIN: HCPCS | Performed by: ANESTHESIOLOGY

## 2020-11-24 PROCEDURE — G8417 CALC BMI ABV UP PARAM F/U: HCPCS | Performed by: ANESTHESIOLOGY

## 2020-11-24 PROCEDURE — G8427 DOCREV CUR MEDS BY ELIG CLIN: HCPCS | Performed by: ANESTHESIOLOGY

## 2020-11-24 PROCEDURE — 1036F TOBACCO NON-USER: CPT | Performed by: ANESTHESIOLOGY

## 2020-11-24 PROCEDURE — 99214 OFFICE O/P EST MOD 30 MIN: CPT | Performed by: ANESTHESIOLOGY

## 2020-11-24 RX ORDER — MELOXICAM 15 MG/1
15 TABLET ORAL DAILY
Qty: 30 TABLET | Refills: 1 | Status: SHIPPED | OUTPATIENT
Start: 2020-11-24 | End: 2021-01-20 | Stop reason: SDUPTHER

## 2020-11-24 RX ORDER — BACLOFEN 10 MG/1
10 TABLET ORAL NIGHTLY
Qty: 30 TABLET | Refills: 1 | Status: CANCELLED | OUTPATIENT
Start: 2020-11-24 | End: 2020-12-24

## 2020-11-24 RX ORDER — TIZANIDINE 4 MG/1
4 TABLET ORAL DAILY PRN
Qty: 30 TABLET | Refills: 1 | Status: SHIPPED | OUTPATIENT
Start: 2020-11-24 | End: 2020-12-24

## 2020-11-24 ASSESSMENT — ENCOUNTER SYMPTOMS
COUGH: 0
WHEEZING: 0
BACK PAIN: 1

## 2020-11-24 NOTE — PROGRESS NOTES
The patient is a 31 y. o. Non-/non  female. Chief Complaint   Patient presents with    Back Pain    Medication Refill        HPI    Lower back pain  Chronic onset many years ago located in the lumbar area across midline affecting both side  Radiates down both legs left more than right over gluteal region and posterior thigh  Aggravated with lifting bending standing and prolonged walking  Alleviating factors are rest and medication  No changes in bladder or bowel control    Patient of tried conservative measures with therapy and NSAIDs  Recently completed MRI lumbar spine and is here for review of results    No previous lumbar spine injection or surgical history    Pain score Today:  6  Adverse effects (Constipation / Nausea / Sedation / sexual Dysfunction / others) : none  Mood: good  Sleep pattern and quality: fair  Activity level: fair    Pill count Today:   Last dose taken  11/24/2020  OARRS report reviewed today: yes  ER/Hospitalizations/PCP visit related to pain since last visit:no   Any legal problems e.g. DUI etc.:No  Satisfied with current management: Yes    Opioid Contract: none  Last Urine Dug screen dated: none    Lab Results   Component Value Date    LABA1C 6.1 (H) 09/28/2020     Lab Results   Component Value Date     09/28/2020       Past Medical History, Past Surgical History, Social History, Allergies and Medications reviewed and updated in EPIC as indicated    Family History reviewed and is noncontributory.       Past Medical History:   Diagnosis Date    ADHD     Anxiety     Brain injury ([address]     Depression     Hearing loss     Learning disability     Migraine headache       Past Surgical History:   Procedure Laterality Date    TONSILLECTOMY      WISDOM TOOTH EXTRACTION       Social History     Socioeconomic History    Marital status: Single     Spouse name: None    Number of children: None    Years of education: None    Highest education level: None Occupational History    None   Social Needs    Financial resource strain: Not hard at all   [employer] insecurity     Worry: Never true     Inability: Never true   [employer] needs     Medical: None     Non-medical: None   Tobacco Use    Smoking status: Former Smoker     Last attempt to quit: 2016     Years since quittin.9    Smokeless tobacco: Never Used   Substance and Sexual Activity    Alcohol use: No     Alcohol/week: 0.0 standard drinks    Drug use: No    Sexual activity: Yes   Lifestyle    Physical activity     Days per week: None     Minutes per session: None    Stress: None   Relationships    Social connections     Talks on phone: None     Gets together: None     Attends Restorationism service: None     Active member of club or organization: None     Attends meetings of clubs or organizations: None     Relationship status: None    Intimate partner violence     Fear of current or ex partner: None     Emotionally abused: None     Physically abused: None     Forced sexual activity: None   Other Topics Concern    None   Social History Narrative    None     Family History   Problem Relation Age of Onset    Diabetes Mother     Heart Disease Father     Diabetes Father     Diabetes Maternal Grandmother     Diabetes Maternal Grandfather     Diabetes Paternal Grandmother     Diabetes Paternal Grandfather     Other Sister         sepsis in blood stream and pneumonia    Alcohol Abuse Brother     Drug Abuse Brother     Alcohol Abuse Brother     Drug Abuse Brother      Allergies   Allergen Reactions    Cat Hair Extract     Dust Mite Extract     Molds & Smuts     Seasonal      Cat hair extract; Dust mite extract; Molds & smuts; and Seasonal   Vitals:    20 1547   Temp: 97.5 °F (36.4 °C)     Current Outpatient Medications   Medication Sig Dispense Refill    meloxicam (MOBIC) 15 MG tablet Take 1 tablet by mouth daily 30 tablet 1    tiZANidine (ZANAFLEX) 4 MG tablet Take 1 tablet light. Pupils are equal.   Skin:  Warm and dry. No rash or cyanosis. Assessment & Plan     1. Abnormal MRI, lumbar spine    2. Chronic bilateral low back pain with bilateral sciatica    3.  Lumbar disc herniation        MRI lumbar spine  Report reviewed  Finding discussed with patient    Chronic low back pain with bilateral sciatica onset many years ago affecting daily life activity refractory to conservative measures with physical therapy and medication management  Will recommend for epidural steroid injection    You do not find baclofen helpful  We will change to tizanidine  We will continue Mobic at this time  Orders Placed This Encounter   Procedures    KS INJECT ANES/STEROID FORAMEN LUMBAR/SACRAL W IMG GUIDE ,1 LEVEL      Orders Placed This Encounter   Medications    meloxicam (MOBIC) 15 MG tablet     Sig: Take 1 tablet by mouth daily     Dispense:  30 tablet     Refill:  1    tiZANidine (ZANAFLEX) 4 MG tablet     Sig: Take 1 tablet by mouth daily as needed (pain)     Dispense:  30 tablet     Refill:  1          Electronically signed by Burt Downs MD on 11/24/2020 at 3:57 PM

## 2020-12-02 ENCOUNTER — TELEPHONE (OUTPATIENT)
Dept: PAIN MANAGEMENT | Age: 30
End: 2020-12-02

## 2020-12-02 NOTE — TELEPHONE ENCOUNTER
Pt notified procedure on 12/28/20 has to be cancelled.  Pt agreed to reschedule to 1/4/21 and requested afternoon time of 2;20 pm

## 2020-12-14 ENCOUNTER — HOSPITAL ENCOUNTER (OUTPATIENT)
Age: 30
Setting detail: OUTPATIENT SURGERY
Discharge: HOME | End: 2020-12-14
Attending: ANESTHESIOLOGY | Admitting: ANESTHESIOLOGY
Payer: MEDICARE

## 2020-12-14 ENCOUNTER — APPOINTMENT (OUTPATIENT)
Dept: GENERAL RADIOLOGY | Age: 30
End: 2020-12-14
Attending: ANESTHESIOLOGY
Payer: MEDICARE

## 2020-12-14 VITALS
RESPIRATION RATE: 16 BRPM | OXYGEN SATURATION: 99 % | SYSTOLIC BLOOD PRESSURE: 109 MMHG | DIASTOLIC BLOOD PRESSURE: 78 MMHG | HEART RATE: 99 BPM | TEMPERATURE: 97.9 F | HEIGHT: 63 IN | WEIGHT: 242.5 LBS | BODY MASS INDEX: 42.97 KG/M2

## 2020-12-14 PROBLEM — M51.26 LUMBAR DISC HERNIATION: Chronic | Status: ACTIVE | Noted: 2020-11-24

## 2020-12-14 LAB — HCG, PREGNANCY URINE (POC): NEGATIVE

## 2020-12-14 PROCEDURE — 7100000010 HC PHASE II RECOVERY - FIRST 15 MIN: Performed by: ANESTHESIOLOGY

## 2020-12-14 PROCEDURE — 3600000051 HC PAIN LEVEL 1 ADDL 15 MIN: Performed by: ANESTHESIOLOGY

## 2020-12-14 PROCEDURE — 81025 URINE PREGNANCY TEST: CPT

## 2020-12-14 PROCEDURE — 6360000002 HC RX W HCPCS: Performed by: ANESTHESIOLOGY

## 2020-12-14 PROCEDURE — 64483 NJX AA&/STRD TFRM EPI L/S 1: CPT | Performed by: ANESTHESIOLOGY

## 2020-12-14 PROCEDURE — 2709999900 HC NON-CHARGEABLE SUPPLY: Performed by: ANESTHESIOLOGY

## 2020-12-14 PROCEDURE — 2500000003 HC RX 250 WO HCPCS: Performed by: ANESTHESIOLOGY

## 2020-12-14 PROCEDURE — 99152 MOD SED SAME PHYS/QHP 5/>YRS: CPT | Performed by: ANESTHESIOLOGY

## 2020-12-14 PROCEDURE — 7100000011 HC PHASE II RECOVERY - ADDTL 15 MIN: Performed by: ANESTHESIOLOGY

## 2020-12-14 PROCEDURE — 3600000050 HC PAIN LEVEL 1 BASE: Performed by: ANESTHESIOLOGY

## 2020-12-14 PROCEDURE — 3209999900 FLUORO FOR SURGICAL PROCEDURES

## 2020-12-14 RX ORDER — SODIUM CHLORIDE 0.9 % (FLUSH) 0.9 %
10 SYRINGE (ML) INJECTION EVERY 12 HOURS SCHEDULED
Status: DISCONTINUED | OUTPATIENT
Start: 2020-12-14 | End: 2020-12-14 | Stop reason: HOSPADM

## 2020-12-14 RX ORDER — MIDAZOLAM HYDROCHLORIDE 1 MG/ML
INJECTION INTRAMUSCULAR; INTRAVENOUS PRN
Status: DISCONTINUED | OUTPATIENT
Start: 2020-12-14 | End: 2020-12-14 | Stop reason: ALTCHOICE

## 2020-12-14 RX ORDER — LIDOCAINE HYDROCHLORIDE 10 MG/ML
INJECTION, SOLUTION INFILTRATION; PERINEURAL PRN
Status: DISCONTINUED | OUTPATIENT
Start: 2020-12-14 | End: 2020-12-14 | Stop reason: ALTCHOICE

## 2020-12-14 RX ORDER — SODIUM CHLORIDE 0.9 % (FLUSH) 0.9 %
10 SYRINGE (ML) INJECTION PRN
Status: DISCONTINUED | OUTPATIENT
Start: 2020-12-14 | End: 2020-12-14 | Stop reason: HOSPADM

## 2020-12-14 RX ORDER — DEXAMETHASONE SODIUM PHOSPHATE 10 MG/ML
INJECTION INTRAMUSCULAR; INTRAVENOUS PRN
Status: DISCONTINUED | OUTPATIENT
Start: 2020-12-14 | End: 2020-12-14 | Stop reason: ALTCHOICE

## 2020-12-14 RX ORDER — FENTANYL CITRATE 50 UG/ML
INJECTION, SOLUTION INTRAMUSCULAR; INTRAVENOUS PRN
Status: DISCONTINUED | OUTPATIENT
Start: 2020-12-14 | End: 2020-12-14 | Stop reason: ALTCHOICE

## 2020-12-14 RX ORDER — LIDOCAINE HYDROCHLORIDE 10 MG/ML
INJECTION, SOLUTION EPIDURAL; INFILTRATION; INTRACAUDAL; PERINEURAL PRN
Status: DISCONTINUED | OUTPATIENT
Start: 2020-12-14 | End: 2020-12-14 | Stop reason: ALTCHOICE

## 2020-12-14 ASSESSMENT — PAIN DESCRIPTION - DESCRIPTORS: DESCRIPTORS: ACHING

## 2020-12-14 ASSESSMENT — PAIN SCALES - GENERAL
PAINLEVEL_OUTOF10: 0
PAINLEVEL_OUTOF10: 6
PAINLEVEL_OUTOF10: 0
PAINLEVEL_OUTOF10: 0

## 2020-12-14 ASSESSMENT — PAIN - FUNCTIONAL ASSESSMENT: PAIN_FUNCTIONAL_ASSESSMENT: 0-10

## 2020-12-14 NOTE — OP NOTE
Operative Note      Patient: Sandee Wood  YOB: 1990  MRN: [id number]    Date of Procedure: 12/14/2020    Pre-Op Diagnosis: DX CHRONIC CODY LOW BACK PAIN WITH BILATERAL SCIATICA, LUMBAR DISC HERNIATION    Post-Op Diagnosis: Same       Procedure(s):  EPIDURAL STEROID INJECTION BILATERAL L5    Surgeon(s):  Yulissa Fernandez MD    Assistant:   * No surgical staff found *    Anesthesia: IV Sedation    Estimated Blood Loss (mL): Minimal    Complications: None    Specimens:   * No specimens in log *    Implants:  * No implants in log *      Drains: * No LDAs found *    Findings: n/a    Detailed Description of Procedure:     Patient Name: Sandee Wood   YOB: 1990  Room/Bed: STAZ OR Pool/NONE  Medical Record Number: [id number]  Date: 12/14/2020       Preoperative Diagnosis:    Lumbar disc herniation    Postoperative diagnosis:   Lumbar disc herniation    Blood Loss: none    Procedure Performed:  Transforaminal lumbar epidural steroid injection at the levels of L4 on the Bilateral side under fluoroscopic guidance. Procedure: The Patient was seen in the preop area, chart was reviewed, informed consent was obtained. Patient was taken to procedure room and was placed in prone position. Vital signs were monitored through out the  Procedure. A time out was completed. The skin over the back was prepped and draped in sterile manner. The target point was marked in ipsilateral obliques just below the pedicle at the target levels. Skin and deep tissues were anesthetized with 1 % lidocaine. A 20-gauge, spinal needle was advanced  under fluoroscopy guidance in AP / Oblique and lateral views, such that the tip of the needle lies in the neuroforamina at about the 6 o'clock position under the pedicle of the target vertebra. This was confirmed with AP and lateral views of the fluoroscopy.      Then after negative aspiration contrast dye Omnipaque-180 was injected with live fluoroscopy in AP views that showed  spread of the contrast in the epidural space  and no vascular runoff or intrathecal spread. Finally 3 ml of treatment solution containing 5 ml of  1 % PF lidocaine and 1 ml of dexamethasone 10 mg / ml was injected. The needle was removed and a Band-Aid was placed over the needle  insertion site. The patient's vital signs remained stable and the patient tolerated the procedure well. The same procedure was then repeated at left at L 4level with same technique. The remaining 3 ml of treatment solution was injected at that. The total dose of steroid injected today was dexamethasone 10 mg. Electronically signed by Milvia Atkinson MD on 12/14/2020 at 12:13 PM    SEDATION NOTE:    ASA CLASSIFICATION  2  MP   CLASSIFICATION  2    · Moderate intravenous conscious sedation was supervised by Dr. Erika Jacobson  . The patient was independently monitored by a Registered Nurse assigned to the Procedure Room  . Monitoring included automated blood pressure, continuous EKG, Capnography and continuous pulse oximetry. . The detailed Conscious Record is permanently stored in the [place] [address].      The following is the conscious sedation record;  Start Time:  1153  End times:  1208  Duration:  15 minutes  MEDS GIVEN 2 MG VERSED AND 50 [address]        Electronically signed by Milvia Atkinson MD on 12/14/2020 at 12:13 PM

## 2020-12-14 NOTE — H&P
History and Physical Update    Pt Name: Josephine Yao  MRN: [id number]  YOB: 1990  Date of evaluation: 12/14/2020      [x] I have reviewed the Pain Management Progress Note by Dr Esther Babcock dated 11/24/20 in epic which meets the criteria for an Interval History and Physical note and is attached below. [x] I have examined  Maira Villalba  There are no changes to the patient who is scheduled for a epidural steroid injectio bilateral L5 by Dr Shraddha Gonzales for chronic bilateral lower back pain with bilateral sciatica, lumbar disc herniation. The patient denies new health changes, fever, chills, wheezing, cough, increased SOB, chest pain, open sores or wounds. Last Mobic and Voltaren 12/13/20    PMH, Surgical History, Social History, Psych, and Family History reviewed and updated in EPIC in appropriate section. Vital signs: /73   Pulse 99   Temp 96.3 °F (35.7 °C) (Skin)   Resp 18   Ht 5' 3\" (1.6 m)   Wt 242 lb 8 oz (110 kg)   LMP 08/01/2017   SpO2 97%   BMI 42.96 kg/m²     Allergies:  Cat hair extract, Dust mite extract, Molds & smuts, and Seasonal    Medications:    Prior to Admission medications    Medication Sig Start Date End Date Taking?  Authorizing Provider   meloxicam (MOBIC) 15 MG tablet Take 1 tablet by mouth daily 11/24/20 12/24/20 Yes Esther Babcock MD   tiZANidine (ZANAFLEX) 4 MG tablet Take 1 tablet by mouth daily as needed (pain) 11/24/20 12/24/20 Yes Esther Babcock MD   Cariprazine HCl (VRAYLAR) 6 MG CAPS Take by mouth daily   Yes Historical Provider, MD   Elastic Bandages & Supports (LUMBAR BACK BRACE/SUPPORT PAD) MISC 1 Units by Does not apply route daily 10/20/20 10/20/21 Yes Esther Babcock MD   medroxyPROGESTERone (DEPO-PROVERA) 150 MG/ML injection INJECT 1  INTRAMUSCULARLY ONCE EVERY 3  MONTH 10/12/20  Yes FUNMILAYO Alfonso CNM   diclofenac (VOLTAREN) 75 MG EC tablet TAKE 1 TABLET BY MOUTH EVERY 12 HOURS AS NEEDED FOR BODY ACHES 9/6/19  Yes Historical Provider, MD         This is a 31 y.o. female who is pleasant, cooperative, alert and oriented x3, in no acute distress. Heart: Heart sounds are normal.  HR 99 regular rate and rhythm without murmur, gallop or rub. Lungs: Normal respiratory effort with equal expansion, good air exchange, unlabored and clear to auscultation without wheezes or rales bilaterally   Abdomen: soft, nontender, nondistended with bowel sounds. Labs:  Recent Labs     12/14/20  1105   HCG NEGATIVE       No results for input(s): COVID19 in the last 720 hours. Mariama Anna APRN, ANP-BC  Electronically signed 12/14/2020 at 11:50 Ajay Tovar MD   Physician   Specialty:  Pain Management   Progress Notes   Signed   Encounter Date:  11/24/2020         Related encounter: Office Visit from 11/24/2020 in [id number] [facility] [place]         Signed        Expand AllCollapse All      The patient is a 31 y. o. Non-/non  female.      Chief Complaint   Patient presents with    Back Pain    Medication Refill         HPI     Lower back pain  Chronic onset many years ago located in the lumbar area across midline affecting both side  Radiates down both legs left more than right over gluteal region and posterior thigh  Aggravated with lifting bending standing and prolonged walking  Alleviating factors are rest and medication  No changes in bladder or bowel control     Patient of tried conservative measures with therapy and NSAIDs  Recently completed MRI lumbar spine and is here for review of results     No previous lumbar spine injection or surgical history     Pain score Today:  6  Adverse effects (Constipation / Nausea / Sedation / sexual Dysfunction / others) : none  Mood: good  Sleep pattern and quality: fair  Activity level: fair     Pill count Today:   Last dose taken  11/24/2020  OARRS report reviewed today: yes  ER/Hospitalizations/PCP visit related to pain since last visit:no   Any legal problems e.g. DUI etc.:No  Satisfied with current management: Yes     Opioid Contract: none  Last Urine Dug screen dated: none           Lab Results   Component Value Date     LABA1C 6.1 (H) 2020            Lab Results   Component Value Date      2020         Past Medical History, Past Surgical History, Social History, Allergies and Medications reviewed and updated in EPIC as indicated     Family History reviewed and is noncontributory.         Past Medical History   Past Medical History:   Diagnosis Date    ADHD      Anxiety      Brain injury ([facility] [address]      Depression      Hearing loss      Learning disability      Migraine headache           Past Surgical History         Past Surgical History:   Procedure Laterality Date    TONSILLECTOMY        WISDOM TOOTH EXTRACTION             Social History   Social History            Socioeconomic History    Marital status: Single       Spouse name: None    Number of children: None    Years of education: None    Highest education level: None   Occupational History    None   Social Needs    Financial resource strain: Not hard at all   Yolanda-Coleen insecurity       Worry: Never true       Inability: Never true   [employer] needs       Medical: None       Non-medical: None   Tobacco Use    Smoking status: Former Smoker       Last attempt to quit: 2016       Years since quittin.9    Smokeless tobacco: Never Used   Substance and Sexual Activity    Alcohol use: No       Alcohol/week: 0.0 standard drinks    Drug use: No    Sexual activity: Yes   Lifestyle    Physical activity       Days per week: None       Minutes per session: None    Stress: None   Relationships    Social connections       Talks on phone: None       Gets together: None       Attends Islam service: None       Active member of club or organization: None       Attends meetings of clubs or organizations: None       Relationship status: None    Intimate partner violence       Fear of current or ex partner: None       Emotionally abused: None       Physically abused: None       Forced sexual activity: None   Other Topics Concern    None   Social History Narrative    None         Family History         Family History   Problem Relation Age of Onset    Diabetes Mother      Heart Disease Father      Diabetes Father      Diabetes Maternal Grandmother      Diabetes Maternal Grandfather      Diabetes Paternal Grandmother      Diabetes Paternal Grandfather      Other Sister           sepsis in blood stream and pneumonia    Alcohol Abuse Brother      Drug Abuse Brother      Alcohol Abuse Brother      Drug Abuse Brother                Allergies   Allergen Reactions    Cat Hair Extract      Dust Mite Extract      Molds & Smuts      Seasonal        Cat hair extract; Dust mite extract; Molds & smuts; and Seasonal   Vitals:     11/24/20 1547   Temp: 97.5 °F (36.4 °C)      Current Facility-Administered Medications          Current Outpatient Medications   Medication Sig Dispense Refill    meloxicam (MOBIC) 15 MG tablet Take 1 tablet by mouth daily 30 tablet 1    tiZANidine (ZANAFLEX) 4 MG tablet Take 1 tablet by mouth daily as needed (pain) 30 tablet 1    Cariprazine HCl (VRAYLAR) 6 MG CAPS Take by mouth daily        Elastic Bandages & Supports (LUMBAR BACK BRACE/SUPPORT PAD) MISC 1 Units by Does not apply route daily 1 each 0    medroxyPROGESTERone (DEPO-PROVERA) 150 MG/ML injection INJECT 1  INTRAMUSCULARLY ONCE EVERY 3  MONTH 1 mL 0    FLUoxetine (PROZAC) 20 MG tablet Take 20 mg by mouth daily        DULoxetine HCl (CYMBALTA PO) Take 20 mg by mouth daily Indications: pt uncertain re: dosing.  diclofenac (VOLTAREN) 75 MG EC tablet TAKE 1 TABLET BY MOUTH EVERY 12 HOURS AS NEEDED FOR BODY ACHES   4    rOPINIRole (REQUIP) 0.5 MG tablet TAKE 2 TABLETS BY MOUTH AT BEDTIME   4    traMADol (ULTRAM) 50 MG tablet TAKE 1 TO 2 EVERY 12 HOURS AS NEEDED .  DO NOT EXCEED 3 TO 4 TABLETS BY MOUTH PER 24 HOURS FOR SEVERE PAIN ONLY. NO MORE THAN 10 TABLETS PER   2    amitriptyline (ELAVIL) 25 MG tablet Take 25 mg by mouth nightly        LYRICA 50 MG capsule            No current facility-administered medications for this visit. Review of Systems   Constitutional: Negative for chills and fever. Respiratory: Negative for cough and wheezing. Musculoskeletal: Positive for back pain. Neurological: Negative for weakness and numbness. Objective:  General Appearance:  Well-appearing and in no acute distress. Vital signs: (most recent): Temperature 97.5 °F (36.4 °C), height 5' 3\" (1.6 m), weight 240 lb (108.9 kg), not currently breastfeeding. Vital signs are normal.  No fever. Output: Producing urine and producing stool. HEENT: Normal HEENT exam.    Lungs:  Normal effort and normal respiratory rate. Breath sounds clear to auscultation. She is not in respiratory distress. Heart: Normal rate. Extremities: Normal range of motion. There is no deformity. Neurological: Patient is alert and oriented to person, place and time. Patient has normal coordination. Pupils:  Pupils are equal, round, and reactive to light. Pupils are equal.   Skin:  Warm and dry. No rash or cyanosis. Assessment & Plan      1. Abnormal MRI, lumbar spine    2. Chronic bilateral low back pain with bilateral sciatica    3.  Lumbar disc herniation        MRI lumbar spine  Report reviewed  Finding discussed with patient     Chronic low back pain with bilateral sciatica onset many years ago affecting daily life activity refractory to conservative measures with physical therapy and medication management  Will recommend for epidural steroid injection     You do not find baclofen helpful  We will change to tizanidine  We will continue Mobic at this time      Orders Placed This Encounter   Procedures    MD INJECT ANES/STEROID FORAMEN LUMBAR/SACRAL W IMG GUIDE ,1 LEVEL      Encounter Medications Orders Placed This Encounter   Medications    meloxicam (MOBIC) 15 MG tablet       Sig: Take 1 tablet by mouth daily       Dispense:  30 tablet       Refill:  1    tiZANidine (ZANAFLEX) 4 MG tablet       Sig: Take 1 tablet by mouth daily as needed (pain)       Dispense:  30 tablet       Refill:  1               Electronically signed by Rick Johnson MD on 11/24/2020 at 3:57 PM            Revision History

## 2020-12-28 RX ORDER — MEDROXYPROGESTERONE ACETATE 150 MG/ML
INJECTION, SUSPENSION INTRAMUSCULAR
Qty: 1 ML | Refills: 0 | Status: SHIPPED | OUTPATIENT
Start: 2020-12-28 | End: 2020-12-29 | Stop reason: SDUPTHER

## 2020-12-28 NOTE — TELEPHONE ENCOUNTER
Harris Romo is requesting a refill on depo.    Last Annual visit:  9/10/19  Last OB visit: n/a  Next OB/Office visit:  12/29/20 for annual  Last prescribing provider:  Byron Valdez

## 2020-12-29 ENCOUNTER — OFFICE VISIT (OUTPATIENT)
Dept: OBGYN CLINIC | Age: 30
End: 2020-12-29
Payer: MEDICARE

## 2020-12-29 VITALS
DIASTOLIC BLOOD PRESSURE: 68 MMHG | WEIGHT: 241.4 LBS | HEART RATE: 87 BPM | SYSTOLIC BLOOD PRESSURE: 107 MMHG | HEIGHT: 63 IN | BODY MASS INDEX: 42.77 KG/M2

## 2020-12-29 PROCEDURE — 96372 THER/PROPH/DIAG INJ SC/IM: CPT | Performed by: ADVANCED PRACTICE MIDWIFE

## 2020-12-29 PROCEDURE — 99395 PREV VISIT EST AGE 18-39: CPT | Performed by: ADVANCED PRACTICE MIDWIFE

## 2020-12-29 PROCEDURE — G8484 FLU IMMUNIZE NO ADMIN: HCPCS | Performed by: ADVANCED PRACTICE MIDWIFE

## 2020-12-29 RX ORDER — MEDROXYPROGESTERONE ACETATE 150 MG/ML
INJECTION, SUSPENSION INTRAMUSCULAR
Qty: 1 ML | Refills: 3 | Status: SHIPPED | OUTPATIENT
Start: 2021-03-15 | End: 2022-02-02

## 2020-12-29 RX ORDER — MEDROXYPROGESTERONE ACETATE 150 MG/ML
150 INJECTION, SUSPENSION INTRAMUSCULAR ONCE
Status: COMPLETED | OUTPATIENT
Start: 2020-12-29 | End: 2020-12-29

## 2020-12-29 RX ORDER — FLUOXETINE 20 MG/1
20 TABLET, FILM COATED ORAL 2 TIMES DAILY
COMMUNITY
Start: 2020-12-26 | End: 2022-06-09 | Stop reason: ALTCHOICE

## 2020-12-29 RX ADMIN — MEDROXYPROGESTERONE ACETATE 150 MG: 150 INJECTION, SUSPENSION INTRAMUSCULAR at 11:15

## 2020-12-29 NOTE — PROGRESS NOTES
After obtaining consent, and per orders of [facility], injection of Depo-Provera 150mg given in RT DorsoGluteal by Jeimy Winston. Patient instructed to remain in clinic for 20 minutes afterwards, and to report any adverse reaction to me immediately.     XOW:[id number]  [facility]:[id number]  EXP:06/01//2022

## 2020-12-29 NOTE — PROGRESS NOTES
Date of Visit:  2020  Patient :  1990     Subjective:     CHIEF COMPLAINT:     Chief Complaint   Patient presents with    Annual Exam     Last Pap:18    Contraception     depo refills       HPI  Here for annual exam. Would like refill on depo. Her bowel habits are regular. She denies any bloating. She denies dysuria. She denies urinary leaking. She denies vaginal discharge. She is sexually active with single partner, contraception - Depo-Provera injections. Depression  2 question Screen:  Over the past two weeks, has the patient felt down,depressed or hopeless? No  Over the past two weeks, has the patient felt little interest or pleasure in doing things? No    PREVENTIVE HEALTH SCREENING:   Date of last pap:  neg                 HPV typing/date :n/a    Abnormal pap smear history: no    Date of last mammogram: n/a   Date of last DEXA scan: n/a  Date of last colonoscopy: n/a    History of Gestational Diabetes: No    Preventive screening: Yes    Family history of Breast, Ovarian, Colon or Uterine Cancer:  neg     If Yes see scanned worksheet    Objective:   GYNECOLOGIC HISTORY:   Menarche: 14    LMP:  No LMP recorded (lmp unknown). Patient has had an injection.     Menopause: n/a  Hormone Replacement: no    Sexually active: Yes  HPV vaccine: no    STD history: No    Birth control method :Yes  Permanent Sterilization: No    SOCIALHISTORY:  Seat Belt Use: Yes  Domestic Violence: No    Counseling: No  Regular exercise: Yes   Counseling:No     Diet discussed: Yes    Social History     Tobacco Use   Smoking Status Former Smoker    Types: Cigarettes    Quit date: 2016    Years since quittin.0   Smokeless Tobacco Never Used     Social History     Substance and Sexual Activity   Alcohol Use No    Alcohol/week: 0.0 standard drinks     Social History     Substance and Sexual Activity   Drug Use No       Current Outpatient Medications   Medication Sig Dispense Refill  [START ON 3/15/2021] medroxyPROGESTERone (DEPO-PROVERA) 150 MG/ML injection INJECT 1 ML INTRAMUSCULARY ONCE EVERY THREE MONTHS 1 mL 3    Cariprazine HCl (VRAYLAR) 6 MG CAPS Take by mouth daily      Elastic Bandages & Supports (LUMBAR BACK BRACE/SUPPORT PAD) MISC 1 Units by Does not apply route daily 1 each 0    FLUoxetine (PROZAC) 20 MG tablet TAKE 1 TABLET BY MOUTH ONCE DAILY      meloxicam (MOBIC) 15 MG tablet Take 1 tablet by mouth daily 30 tablet 1    diclofenac (VOLTAREN) 75 MG EC tablet TAKE 1 TABLET BY MOUTH EVERY 12 HOURS AS NEEDED FOR BODY ACHES  4     No current facility-administered medications for this visit. REVIEW OF SYSTEMS:  Review of Systems   Constitutional: Negative. HENT: Negative. Eyes: Negative. Respiratory: Negative. Cardiovascular: Negative. Gastrointestinal: Negative. Endocrine: Negative. Genitourinary: Negative. Musculoskeletal: Negative. Skin: Negative. Allergic/Immunologic: Negative. Neurological: Negative. Hematological: Negative. Psychiatric/Behavioral: Negative. Physical Exam:     Constitutional:   Blood pressure 107/68, pulse 87, height 5' 3\" (1.6 m), weight 241 lb 6.4 oz (109.5 kg), not currently breastfeeding. Wt Readings from Last 3 Encounters:   12/29/20 241 lb 6.4 oz (109.5 kg)   12/14/20 242 lb 8 oz (110 kg)   11/24/20 240 lb (108.9 kg)       General Appearance: This is a well-developed, well-nourished and well-groomed female    Skin:  Inspection of the skin revealed no rashes or lesions    Neck and EENT:  No eye discharge and sclera non-icteric  Lips, teeth and gums without lesions and normal dentition  Nares are patent without discharge  Normal external ears are present with no hearing loss  The neck was supple with full range of motion and no masses. The thyroid was not enlarged and had no masses.   No enlarged cervical lymph nodes    Respiratory: discussed need for colonoscopy screening as well as onset for bone density testing.- discussed risks associated with long term depo use  discussed birth control and barrier recommendations. discussed STD counseling and prevention. discussed Gardisil counseling for all patients 9-27 yo. Hereditary Breast, Ovarian, Colon and Uterine Cancer screening discussed. Tobacco & Secondary smoke risks discussed; with recommendation for cessation and avoidance. Routine health maintenance per patients PCP discussed. Patient was seen with total face to face time of 25 minutes. More than 50% of this visit was counseling and education regarding    Diagnosis Orders   1. Women's annual routine gynecological examination     2. Dysmenorrhea  medroxyPROGESTERone (DEPO-PROVERA) injection 150 mg   3. Surveillance for Depo-Provera contraception  medroxyPROGESTERone (DEPO-PROVERA) injection 150 mg   4.  Encounter for surveillance of injectable contraceptive  medroxyPROGESTERone (DEPO-PROVERA) 150 MG/ML injection       Electronically signed by Santhosh Montalvo on 12/29/20 at 10:54 AM EST

## 2020-12-30 ASSESSMENT — ENCOUNTER SYMPTOMS
ALLERGIC/IMMUNOLOGIC NEGATIVE: 1
EYES NEGATIVE: 1
GASTROINTESTINAL NEGATIVE: 1
RESPIRATORY NEGATIVE: 1

## 2021-01-04 ENCOUNTER — HOSPITAL ENCOUNTER (OUTPATIENT)
Age: 31
Setting detail: OUTPATIENT SURGERY
Discharge: HOME | End: 2021-01-04
Attending: ANESTHESIOLOGY | Admitting: ANESTHESIOLOGY
Payer: MEDICARE

## 2021-01-04 ENCOUNTER — APPOINTMENT (OUTPATIENT)
Dept: GENERAL RADIOLOGY | Age: 31
End: 2021-01-04
Attending: ANESTHESIOLOGY
Payer: MEDICARE

## 2021-01-04 VITALS
HEART RATE: 62 BPM | HEIGHT: 63 IN | SYSTOLIC BLOOD PRESSURE: 114 MMHG | WEIGHT: 240 LBS | BODY MASS INDEX: 42.52 KG/M2 | OXYGEN SATURATION: 99 % | DIASTOLIC BLOOD PRESSURE: 82 MMHG | TEMPERATURE: 98.1 F | RESPIRATION RATE: 14 BRPM

## 2021-01-04 PROBLEM — M54.16 LEFT LUMBAR RADICULITIS: Chronic | Status: ACTIVE | Noted: 2021-01-04

## 2021-01-04 LAB — HCG, PREGNANCY URINE (POC): NEGATIVE

## 2021-01-04 PROCEDURE — 7100000010 HC PHASE II RECOVERY - FIRST 15 MIN: Performed by: ANESTHESIOLOGY

## 2021-01-04 PROCEDURE — 64484 NJX AA&/STRD TFRM EPI L/S EA: CPT | Performed by: ANESTHESIOLOGY

## 2021-01-04 PROCEDURE — 7100000011 HC PHASE II RECOVERY - ADDTL 15 MIN: Performed by: ANESTHESIOLOGY

## 2021-01-04 PROCEDURE — 99152 MOD SED SAME PHYS/QHP 5/>YRS: CPT | Performed by: ANESTHESIOLOGY

## 2021-01-04 PROCEDURE — 3600000050 HC PAIN LEVEL 1 BASE: Performed by: ANESTHESIOLOGY

## 2021-01-04 PROCEDURE — 6360000002 HC RX W HCPCS: Performed by: ANESTHESIOLOGY

## 2021-01-04 PROCEDURE — 6360000004 HC RX CONTRAST MEDICATION: Performed by: ANESTHESIOLOGY

## 2021-01-04 PROCEDURE — 2709999900 HC NON-CHARGEABLE SUPPLY: Performed by: ANESTHESIOLOGY

## 2021-01-04 PROCEDURE — 81025 URINE PREGNANCY TEST: CPT

## 2021-01-04 PROCEDURE — 3209999900 FLUORO FOR SURGICAL PROCEDURES

## 2021-01-04 PROCEDURE — 64483 NJX AA&/STRD TFRM EPI L/S 1: CPT | Performed by: ANESTHESIOLOGY

## 2021-01-04 PROCEDURE — 2500000003 HC RX 250 WO HCPCS: Performed by: ANESTHESIOLOGY

## 2021-01-04 RX ORDER — MIDAZOLAM HYDROCHLORIDE 1 MG/ML
INJECTION INTRAMUSCULAR; INTRAVENOUS PRN
Status: DISCONTINUED | OUTPATIENT
Start: 2021-01-04 | End: 2021-01-04 | Stop reason: ALTCHOICE

## 2021-01-04 RX ORDER — FENTANYL CITRATE 50 UG/ML
INJECTION, SOLUTION INTRAMUSCULAR; INTRAVENOUS PRN
Status: DISCONTINUED | OUTPATIENT
Start: 2021-01-04 | End: 2021-01-04 | Stop reason: ALTCHOICE

## 2021-01-04 RX ORDER — DEXAMETHASONE SODIUM PHOSPHATE 10 MG/ML
INJECTION INTRAMUSCULAR; INTRAVENOUS PRN
Status: DISCONTINUED | OUTPATIENT
Start: 2021-01-04 | End: 2021-01-04 | Stop reason: ALTCHOICE

## 2021-01-04 RX ORDER — LIDOCAINE HYDROCHLORIDE 10 MG/ML
INJECTION, SOLUTION EPIDURAL; INFILTRATION; INTRACAUDAL; PERINEURAL PRN
Status: DISCONTINUED | OUTPATIENT
Start: 2021-01-04 | End: 2021-01-04 | Stop reason: ALTCHOICE

## 2021-01-04 ASSESSMENT — PAIN SCALES - GENERAL
PAINLEVEL_OUTOF10: 0

## 2021-01-04 NOTE — H&P
MG EC tablet TAKE 1 TABLET BY MOUTH EVERY 12 HOURS AS NEEDED FOR BODY ACHES 9/6/19   Historical Provider, MD        Allergies:     Cat hair extract, Dust mite extract, Molds & smuts, and Seasonal    Social History:     Tobacco:    reports that she quit smoking about 5 years ago. Her smoking use included cigarettes. She has never used smokeless tobacco.  Alcohol:      reports no history of alcohol use. Drug Use:  reports no history of drug use. Family History:     Family History   Problem Relation Age of Onset    Diabetes Mother     Heart Disease Father     Diabetes Father     Diabetes Maternal Grandmother     Diabetes Maternal Grandfather     Diabetes Paternal Grandmother     Diabetes Paternal Grandfather     Other Sister         sepsis in blood stream and pneumonia    Alcohol Abuse Brother     Drug Abuse Brother     Alcohol Abuse Brother     Drug Abuse Brother     Breast Cancer Neg Hx     Colon Cancer Neg Hx     Ovarian Cancer Neg Hx        Review of Systems:     Positive and Negative as described in HPI. CONSTITUTIONAL:  negative for fevers, chills, sweats, fatigue, weight loss  HEENT:  negative for vision, hearing changes, runny nose, throat pain  RESPIRATORY:  negative for shortness of breath, cough, congestion, wheezing. CARDIOVASCULAR:  negative for chest pain, palpitations.   GASTROINTESTINAL:  negative for nausea  HAS GERD , vomiting, diarrhea, constipation, change in bowel habits, abdominal pain   GENITOURINARY:  negative for difficulty of urination, burning with urination, frequency   INTEGUMENT:  negative for rash, skin lesions, easy bruising   HEMATOLOGIC/LYMPHATIC:  negative for swelling/edema   ALLERGIC/IMMUNOLOGIC:  negative for urticaria , itching  ENDOCRINE:  negative increase in drinking, increase in urination, hot or cold intolerance  MUSCULOSKELETAL:  negative joint pains, muscle aches, swelling of joints  NEUROLOGICAL:  negative for headaches, dizziness, lightheadedness, numbness, pain, tingling extremities  BEHAVIOR/PSYCH:  IS BEING TREATED FOR DEPRESSION or depression, anxiety    Physical Exam:   /74   Pulse 91   SpO2 98%   No LMP recorded. Patient has had an injection.   No results for input(s): POCGLU in the last 72 hours. General Appearance:  alert, well appearing, and in no acute distress  Mental status: oriented to person, place, and time with normal affect  Head:  normocephalic, atraumatic. Eye: no icterus, redness, pupils equal and reactive, extraocular eye movements intact, conjunctiva clear  Ear: normal external ear, no discharge, hearing intact  Nose:  no drainage noted  Mouth: mucous membranes moist  Neck: supple, no carotid bruits, thyroid not palpable  Lungs: Bilateral equal air entry, clear to ausculation, no wheezing, rales or rhonchi, normal effort  Cardiovascular: HR  normal rate, regular rhythm, no murmur, gallop, rub. Abdomen: Soft, nontender, nondistended, normal bowel sounds  Neurologic: There are no new focal motor or sensory deficits, normal muscle tone and bulk, no abnormal sensation, normal speech, cranial nerves II through XII grossly intact  Skin: No gross lesions, rashes, bruising or bleeding on exposed skin area  Extremities:  peripheral pulses palpable, no pedal edema or calf pain with palpation  Psych: normal affect     Investigations:      Laboratory Testing:  No results found for this or any previous visit (from the past 24 hour(s)). Recent Labs     20  1105   HCG NEGATIVE       No results for input(s): COVID19 in the last 720 hours. Imaging/Diagnostics:    Fluoro For Surgical Procedures    Result Date: 2020  Radiology exam is complete. No Radiologist dictation. Please follow up with ordering provider. Diagnosis:      1. CHRONIC LOW BACK PAIN WITH RADIATION TO LEFT LEG     Plans:     1. EPIDURAL STEROID INJECTION BILATERAL WITH L/5       FUNMILAYO Chew - CNP  2021  9:42 AM

## 2021-01-04 NOTE — OP NOTE
Operative Note      Patient: Erin Shipley  YOB: 1990  MRN: [id number]    Date of Procedure: 1/4/2021    Pre-Op Diagnosis: DX CHRONIC CODY LOW BACK PAIN WITH CODY SCIATICA, LUMBAR [address] HERNIATION    Post-Op Diagnosis: Same       Procedure(s):  EPIDURAL STEROID INJECTION BILATERAL L5    Surgeon(s):  Aguilar Cardoso MD    Assistant:   * No surgical staff found *    Anesthesia: IV Sedation    Estimated Blood Loss (mL): Minimal    Complications: None    Specimens:   * No specimens in log *    Implants:  * No implants in log *      Drains: * No LDAs found *    Findings: N/A    Detailed Description of Procedure:     Patient Name: Erin Shipley   YOB: 1990  Room/Bed: STAZ OR Pool/NONE  Medical Record Number: [id number]  Date: 1/4/2021       Preoperative Diagnosis:    Lumbar disc herniation  Left lumbar radiculitis    Postoperative diagnosis:   Lumbar disc herniation  Left lumbar radiculitis    Blood Loss: none    Procedure Performed:  Transforaminal lumbar epidural steroid injection at the levels of l4 aqnd l5  on the Left side under fluoroscopic guidance. Procedure: The Patient was seen in the preop area, chart was reviewed, informed consent was obtained. Patient was taken to procedure room and was placed in prone position. Vital signs were monitored through out the  Procedure. A time out was completed. The skin over the back was prepped and draped in sterile manner. The target point was marked in ipsilateral obliques just below the pedicle at the target levels. Skin and deep tissues were anesthetized with 1 % lidocaine. A 20-gauge, spinal needle was advanced  under fluoroscopy guidance in AP / Oblique and lateral views, such that the tip of the needle lies in the neuroforamina at about the 6 o'clock position under the pedicle of the target vertebra. This was confirmed with AP and lateral views of the fluoroscopy.      Then after negative aspiration contrast dye Omnipaque-180 was injected with live fluoroscopy in AP views that showed  spread of the contrast in the epidural space  and no vascular runoff or intrathecal spread. Finally 3 ml of treatment solution containing 5 ml of  1 % PF lidocaine and 1 ml of dexamethasone 10 mg / ml was injected. The needle was removed and a Band-Aid was placed over the needle  insertion site. The patient's vital signs remained stable and the patient tolerated the procedure well. The same procedure was then repeated at left at L 5level with same technique. The remaining 3 ml of treatment solution was injected at that. The total dose of steroid injected today was dexamethasone 10 mg. Electronically signed by Jag Alexandre MD on 1/4/2021 at 10:44 AM    SEDATION NOTE:    ASA CLASSIFICATION  2  MP   CLASSIFICATION  2    · Moderate intravenous conscious sedation was supervised by Dr. Angelica Marino  . The patient was independently monitored by a Registered Nurse assigned to the Procedure Room  . Monitoring included automated blood pressure, continuous EKG, Capnography and continuous pulse oximetry. . The detailed Conscious Record is permanently stored in the [place] [id number].      The following is the conscious sedation record;  Start Time:  1026  End times:  1038  Duration:  12 minutes  MEDS GIVEN 2 MG VERSED  MCG FENTANYL        Electronically signed by Jag Alexandre MD on 1/4/2021 at 10:44 AM

## 2021-01-20 ENCOUNTER — OFFICE VISIT (OUTPATIENT)
Dept: PAIN MANAGEMENT | Age: 31
End: 2021-01-20
Payer: MEDICARE

## 2021-01-20 VITALS
WEIGHT: 246 LBS | OXYGEN SATURATION: 99 % | HEART RATE: 108 BPM | HEIGHT: 63 IN | BODY MASS INDEX: 43.59 KG/M2 | SYSTOLIC BLOOD PRESSURE: 99 MMHG | DIASTOLIC BLOOD PRESSURE: 72 MMHG | TEMPERATURE: 96.8 F

## 2021-01-20 DIAGNOSIS — M54.42 CHRONIC BILATERAL LOW BACK PAIN WITH BILATERAL SCIATICA: ICD-10-CM

## 2021-01-20 DIAGNOSIS — G89.29 CHRONIC BILATERAL LOW BACK PAIN WITH BILATERAL SCIATICA: ICD-10-CM

## 2021-01-20 DIAGNOSIS — M54.41 CHRONIC BILATERAL LOW BACK PAIN WITH BILATERAL SCIATICA: ICD-10-CM

## 2021-01-20 DIAGNOSIS — M51.26 LUMBAR DISC HERNIATION: ICD-10-CM

## 2021-01-20 PROCEDURE — G8427 DOCREV CUR MEDS BY ELIG CLIN: HCPCS | Performed by: ANESTHESIOLOGY

## 2021-01-20 PROCEDURE — G8417 CALC BMI ABV UP PARAM F/U: HCPCS | Performed by: ANESTHESIOLOGY

## 2021-01-20 PROCEDURE — G8484 FLU IMMUNIZE NO ADMIN: HCPCS | Performed by: ANESTHESIOLOGY

## 2021-01-20 PROCEDURE — 1036F TOBACCO NON-USER: CPT | Performed by: ANESTHESIOLOGY

## 2021-01-20 PROCEDURE — 99213 OFFICE O/P EST LOW 20 MIN: CPT | Performed by: ANESTHESIOLOGY

## 2021-01-20 RX ORDER — MELOXICAM 15 MG/1
15 TABLET ORAL DAILY
Qty: 30 TABLET | Refills: 1 | Status: SHIPPED | OUTPATIENT
Start: 2021-01-20 | End: 2021-06-17

## 2021-01-20 RX ORDER — TIZANIDINE 4 MG/1
4 TABLET ORAL NIGHTLY PRN
Qty: 30 TABLET | Refills: 1 | Status: SHIPPED | OUTPATIENT
Start: 2021-01-20 | End: 2021-02-19

## 2021-01-20 ASSESSMENT — ENCOUNTER SYMPTOMS
RESPIRATORY NEGATIVE: 1
BACK PAIN: 1

## 2021-01-20 NOTE — PROGRESS NOTES
The patient is a 31 y. o. Non-/non  female. Chief Complaint   Patient presents with    Back Pain    Medication Refill        HPI    Pleasant 82-year-old female who was seen in my clinic for chronic lower back pain  Pain radiating down left leg more than right associated with numbness  Pain aggravated with lifting bending and walking  Patient failed conservative measures  With a lumbar epidural injection  Today she is here for postprocedure follow-up  Report 95% improvement in her leg pain  Currently taking tizanidine and meloxicam as needed basis  Report no side effect from the medication  No side effects from the procedure    S/P:EPIDURAL STEROID INJECTION BILATERAL L5      Outcome   Any improvement of activity?   Yes   Any side effects (appetite,leg cramping,facial fleshing):no   Increase of pain:  Yes  Pain score Today:  5  % of pain relief:95%   Pain diary (medial branch block): No      Pain score Today:  5  Adverse effects (Constipation / Nausea / Sedation / sexual Dysfunction / others) : no   Mood: fair  Sleep pattern and quality: poor  Activity level: fair    Pill count Today:na   Last dose taken  1/20/21  OARRS report reviewed today: yes  ER/Hospitalizations/PCP visit related to pain since last visit:no   Any legal problems e.g. DUI etc.:No  Satisfied with current management: Yes    Opioid Contract:na   Last Urine Dug screen dated na       Past Medical History, Past Surgical History, Social History, Allergies and Medications, reviewed and updated in EPIC as indicated      Past Medical History:   Diagnosis Date    ADHD     Anxiety     Back pain     [facility] [address]     Depression     Hearing loss     Learning disability     Migraine headache       Past Surgical History:   Procedure Laterality Date    PAIN MANAGEMENT PROCEDURE Bilateral 12/14/2020    EPIDURAL STEROID INJECTION BILATERAL L5 performed by Soheila Whitley MD at [address] [facility] Bilateral 1/4/2021 EPIDURAL STEROID INJECTION BILATERAL L5 performed by Lawson Faust MD at [address] EXTRACTION       Social History     Socioeconomic History    Marital status: Single     Spouse name: None    Number of children: None    Years of education: None    Highest education level: None   Occupational History    None   Social Needs    Financial resource strain: Not hard at all   [place] insecurity     Worry: Never true     Inability: Never true   [employer] needs     Medical: No     Non-medical: No   Tobacco Use    Smoking status: Former Smoker     Types: Cigarettes     Quit date: 2016     Years since quittin.0    Smokeless tobacco: Never Used   Substance and Sexual Activity    Alcohol use: No     Alcohol/week: 0.0 standard drinks    Drug use: No    Sexual activity: Yes   Lifestyle    Physical activity     Days per week: None     Minutes per session: None    Stress: None   Relationships    Social connections     Talks on phone: None     Gets together: None     Attends Anglican service: None     Active member of club or organization: None     Attends meetings of clubs or organizations: None     Relationship status: None    Intimate partner violence     Fear of current or ex partner: None     Emotionally abused: None     Physically abused: None     Forced sexual activity: None   Other Topics Concern    None   Social History Narrative    None     Family History   Problem Relation Age of Onset    Diabetes Mother     Heart Disease Father     Diabetes Father     Diabetes Maternal Grandmother     Diabetes Maternal Grandfather     Diabetes Paternal Grandmother     Diabetes Paternal Grandfather     Other Sister         sepsis in blood stream and pneumonia    Alcohol Abuse Brother     Drug Abuse Brother     Alcohol Abuse Brother     Drug Abuse Brother     Breast Cancer Neg Hx     Colon Cancer Neg Hx     Ovarian Cancer Neg Hx      Allergies Neurological: Patient is alert and oriented to person, place and time. Patient has normal coordination. Pupils:  Pupils are equal, round, and reactive to light. Pupils are equal.   Skin:  Warm and dry. No rash or cyanosis. Assessment & Plan  1. Chronic bilateral low back pain with bilateral sciatica    2. Lumbar disc herniation    Chronic low back pain with left-sided sciatica  Failed different modalities including therapy  Responded very well to epidural injection  Report 95% relief  No side effect  Symptoms are currently well controlled    Advised patient to use meloxicam and tizanidine for as needed basis  Follow-up in 2 months    May consider for repeat injection if symptoms return at least 3 months interval    No orders of the defined types were placed in this encounter.      Orders Placed This Encounter   Medications    meloxicam (MOBIC) 15 MG tablet     Sig: Take 1 tablet by mouth daily     Dispense:  30 tablet     Refill:  1    tiZANidine (ZANAFLEX) 4 MG tablet     Sig: Take 1 tablet by mouth nightly as needed (pain and spasm)     Dispense:  30 tablet     Refill:  1          Electronically signed by Adrianna Swann MD on 1/20/2021 at 1:36 PM

## 2021-03-16 ENCOUNTER — NURSE ONLY (OUTPATIENT)
Dept: OBGYN CLINIC | Age: 31
End: 2021-03-16
Payer: MEDICARE

## 2021-03-16 DIAGNOSIS — Z30.42 SURVEILLANCE FOR DEPO-PROVERA CONTRACEPTION: Primary | ICD-10-CM

## 2021-03-16 PROCEDURE — 96372 THER/PROPH/DIAG INJ SC/IM: CPT | Performed by: ADVANCED PRACTICE MIDWIFE

## 2021-03-16 RX ORDER — MEDROXYPROGESTERONE ACETATE 150 MG/ML
150 INJECTION, SUSPENSION INTRAMUSCULAR ONCE
Status: COMPLETED | OUTPATIENT
Start: 2021-03-16 | End: 2021-03-16

## 2021-03-16 RX ADMIN — MEDROXYPROGESTERONE ACETATE 150 MG: 150 INJECTION, SUSPENSION INTRAMUSCULAR at 15:30

## 2021-03-16 ASSESSMENT — PATIENT HEALTH QUESTIONNAIRE - PHQ9
1. LITTLE INTEREST OR PLEASURE IN DOING THINGS: 0
2. FEELING DOWN, DEPRESSED OR HOPELESS: 0
SUM OF ALL RESPONSES TO PHQ QUESTIONS 1-9: 0
SUM OF ALL RESPONSES TO PHQ9 QUESTIONS 1 & 2: 0
SUM OF ALL RESPONSES TO PHQ QUESTIONS 1-9: 0

## 2021-03-16 NOTE — PROGRESS NOTES
After obtaining consent, and per orders of Anastacia lombardi CNM, injection of medroxyprogesterone given in Left upper quad. gluteus by Helen Springer. Patient instructed to remain in clinic for 20 minutes afterwards, and to report any adverse reaction to me immediately.

## 2021-03-17 ENCOUNTER — OFFICE VISIT (OUTPATIENT)
Dept: PAIN MANAGEMENT | Age: 31
End: 2021-03-17
Payer: MEDICARE

## 2021-03-17 VITALS
HEART RATE: 87 BPM | DIASTOLIC BLOOD PRESSURE: 77 MMHG | SYSTOLIC BLOOD PRESSURE: 109 MMHG | TEMPERATURE: 98 F | BODY MASS INDEX: 43.58 KG/M2 | HEIGHT: 63 IN | OXYGEN SATURATION: 98 %

## 2021-03-17 DIAGNOSIS — G89.29 CHRONIC BILATERAL LOW BACK PAIN WITHOUT SCIATICA: ICD-10-CM

## 2021-03-17 DIAGNOSIS — M47.816 LUMBAR FACET ARTHROPATHY: Primary | ICD-10-CM

## 2021-03-17 DIAGNOSIS — M51.26 LUMBAR DISC HERNIATION: ICD-10-CM

## 2021-03-17 DIAGNOSIS — M54.50 CHRONIC BILATERAL LOW BACK PAIN WITHOUT SCIATICA: ICD-10-CM

## 2021-03-17 PROCEDURE — G8427 DOCREV CUR MEDS BY ELIG CLIN: HCPCS | Performed by: ANESTHESIOLOGY

## 2021-03-17 PROCEDURE — 99214 OFFICE O/P EST MOD 30 MIN: CPT | Performed by: ANESTHESIOLOGY

## 2021-03-17 PROCEDURE — 1036F TOBACCO NON-USER: CPT | Performed by: ANESTHESIOLOGY

## 2021-03-17 PROCEDURE — G8484 FLU IMMUNIZE NO ADMIN: HCPCS | Performed by: ANESTHESIOLOGY

## 2021-03-17 PROCEDURE — G8417 CALC BMI ABV UP PARAM F/U: HCPCS | Performed by: ANESTHESIOLOGY

## 2021-03-17 RX ORDER — BACLOFEN 10 MG/1
10 TABLET ORAL NIGHTLY
Qty: 30 TABLET | Refills: 1 | Status: SHIPPED | OUTPATIENT
Start: 2021-03-17 | End: 2021-04-16

## 2021-03-17 ASSESSMENT — ENCOUNTER SYMPTOMS
BACK PAIN: 1
RESPIRATORY NEGATIVE: 1

## 2021-03-17 NOTE — PROGRESS NOTES
The patient is a 31 y. o. Non-/non  female. Chief Complaint   Patient presents with    Back Pain       HPI    70-year-old female with history of chronic back and leg pain  Leg pain improved after epidural injection  Main issue is mid and lower back pain  Pain is constant aching nagging stiffness  Aggravated with routine activities  She did therapy in past  Have tried NSAIDs  Currently taking Prozac for depression  Have recent MRI lumbar spine available in epic  No changes in bladder or bowel control  Rates average pain score 5/10    Patient diagnosed with chronic bilateral low back pain with bilateral sciatica. Patient rates pain to back today as 5/10, the pain is intermittent ache and pressure. Aggravating factors include sitting and standing for prolonged periods. Alleviating factors:nothing. Patient states she does not need medication refills at this time.     Past Medical History:   Diagnosis Date    ADHD     Anxiety     Back pain     Brain injury ([facility] [address]     Depression     Hearing loss     Learning disability     Migraine headache       Past Surgical History:   Procedure Laterality Date    PAIN MANAGEMENT PROCEDURE Bilateral 12/14/2020    EPIDURAL STEROID INJECTION BILATERAL L5 performed by Ever Carpio MD at [address] [place] [address] Bilateral 1/4/2021    EPIDURAL STEROID INJECTION BILATERAL L5 performed by Ever Carpio MD at [address] [place] [address]       Social History     Socioeconomic History    Marital status: Single     Spouse name: None    Number of children: None    Years of education: None    Highest education level: None   Occupational History    None   Social Needs    Financial resource strain: Not hard at all   [place] insecurity     Worry: Never true     Inability: Never true   [employer] needs     Medical: No     Non-medical: No   Tobacco Use    Smoking status: Former Smoker     Types: Cigarettes     Quit date: 2016     Years since quittin.2    Smokeless tobacco: Never Used   Substance and Sexual Activity    Alcohol use: No     Alcohol/week: 0.0 standard drinks    Drug use: No    Sexual activity: Yes   Lifestyle    Physical activity     Days per week: None     Minutes per session: None    Stress: None   Relationships    Social connections     Talks on phone: None     Gets together: None     Attends Religion service: None     Active member of club or organization: None     Attends meetings of clubs or organizations: None     Relationship status: None    Intimate partner violence     Fear of current or ex partner: None     Emotionally abused: None     Physically abused: None     Forced sexual activity: None   Other Topics Concern    None   Social History Narrative    None     Family History   Problem Relation Age of Onset    Diabetes Mother     Heart Disease Father     Diabetes Father     Diabetes Maternal Grandmother     Diabetes Maternal Grandfather     Diabetes Paternal Grandmother     Diabetes Paternal Grandfather     Other Sister         sepsis in blood stream and pneumonia    Alcohol Abuse Brother     Drug Abuse Brother     Alcohol Abuse Brother     Drug Abuse Brother     Breast Cancer Neg Hx     Colon Cancer Neg Hx     Ovarian Cancer Neg Hx      Allergies   Allergen Reactions    Cat Hair Extract     Dust Mite Extract     Molds & Smuts     Seasonal      Cat hair extract, Dust mite extract, Molds & smuts, and Seasonal   Vitals:    21 1347   BP: 109/77   Pulse: 87   Temp: 98 °F (36.7 °C)   SpO2: 98%     Current Outpatient Medications   Medication Sig Dispense Refill    Elastic Bandages & Supports (LUMBAR BACK BRACE/SUPPORT PAD) MISC 1 Units by Does not apply route daily PNEUMATIC OFF LOADNG BACK BRACE 1 each 0    baclofen (LIORESAL) 10 MG tablet Take 1 tablet by mouth nightly 30 tablet 1    meloxicam (MOBIC) 15 MG tablet Take 1 tablet by mouth daily 30 tablet 1    FLUoxetine (PROZAC) 20 MG tablet TAKE 1 TABLET BY MOUTH ONCE DAILY      medroxyPROGESTERone (DEPO-PROVERA) 150 MG/ML injection INJECT 1 ML INTRAMUSCULARY ONCE EVERY THREE MONTHS 1 mL 3    Cariprazine HCl (VRAYLAR) 6 MG CAPS Take by mouth daily      diclofenac (VOLTAREN) 75 MG EC tablet TAKE 1 TABLET BY MOUTH EVERY 12 HOURS AS NEEDED FOR BODY ACHES  4    Elastic Bandages & Supports (LUMBAR BACK BRACE/SUPPORT PAD) MISC 1 Units by Does not apply route daily 1 each 0     No current facility-administered medications for this visit. Review of Systems   Constitutional: Negative. Negative for fever. Respiratory: Negative. Musculoskeletal: Positive for arthralgias, back pain, myalgias and neck pain. Neurological: Negative. Objective:  General Appearance:  Well-appearing, in no acute distress, uncomfortable and in pain. Vital signs: (most recent): Blood pressure 109/77, pulse 87, temperature 98 °F (36.7 °C), temperature source Infrared, height 5' 3\" (1.6 m), SpO2 98 %, not currently breastfeeding. Vital signs are normal.  No fever. Output: Producing urine and producing stool. HEENT: Normal HEENT exam.    Lungs:  Normal effort and normal respiratory rate. Breath sounds clear to auscultation. She is not in respiratory distress. Heart: Normal rate. Extremities: Normal range of motion. There is no deformity. Neurological: Patient is alert and oriented to person, place and time. Patient has normal coordination. Pupils:  Pupils are equal, round, and reactive to light. Pupils are equal.   Skin:  Warm and dry. No rash or cyanosis. Lumbar spine examination  No apparent deformity on inspection  Range of motion is limited and associated with pain  Positive tenderness to palpation of bilateral lower lumbar paraspinal muscle and facet joint  Facet loading maneuver positive  Gait stable    Assessment & Plan  1. Lumbar facet arthropathy    2.  Chronic bilateral low back pain without sciatica    3.  Lumbar disc herniation        Orders Placed This Encounter   Procedures    FACET JOINT L/S SINGLE    Ambulatory referral to Physical Therapy      Orders Placed This Encounter   Medications    Elastic Bandages & Supports (LUMBAR BACK BRACE/SUPPORT PAD) MISC     Si Units by Does not apply route daily PNEUMATIC OFF LOADNG BACK BRACE     Dispense:  1 each     Refill:  0    baclofen (LIORESAL) 10 MG tablet     Sig: Take 1 tablet by mouth nightly     Dispense:  30 tablet     Refill:  1          Electronically signed by Catie Germain MD on 3/17/2021 at 2:28 PM

## 2021-03-25 ENCOUNTER — APPOINTMENT (OUTPATIENT)
Dept: GENERAL RADIOLOGY | Age: 31
End: 2021-03-25
Attending: ANESTHESIOLOGY
Payer: MEDICARE

## 2021-03-25 ENCOUNTER — HOSPITAL ENCOUNTER (OUTPATIENT)
Age: 31
Setting detail: OUTPATIENT SURGERY
Discharge: HOME | End: 2021-03-25
Attending: ANESTHESIOLOGY | Admitting: ANESTHESIOLOGY
Payer: MEDICARE

## 2021-03-25 VITALS
DIASTOLIC BLOOD PRESSURE: 76 MMHG | HEART RATE: 75 BPM | RESPIRATION RATE: 15 BRPM | SYSTOLIC BLOOD PRESSURE: 101 MMHG | OXYGEN SATURATION: 96 % | WEIGHT: 255.6 LBS | BODY MASS INDEX: 45.29 KG/M2 | HEIGHT: 63 IN | TEMPERATURE: 97.3 F

## 2021-03-25 LAB — HCG, PREGNANCY URINE (POC): NEGATIVE

## 2021-03-25 PROCEDURE — 64493 INJ PARAVERT F JNT L/S 1 LEV: CPT | Performed by: ANESTHESIOLOGY

## 2021-03-25 PROCEDURE — 6360000002 HC RX W HCPCS: Performed by: ANESTHESIOLOGY

## 2021-03-25 PROCEDURE — 7100000011 HC PHASE II RECOVERY - ADDTL 15 MIN: Performed by: ANESTHESIOLOGY

## 2021-03-25 PROCEDURE — 7100000010 HC PHASE II RECOVERY - FIRST 15 MIN: Performed by: ANESTHESIOLOGY

## 2021-03-25 PROCEDURE — 3209999900 FLUORO FOR SURGICAL PROCEDURES

## 2021-03-25 PROCEDURE — 6360000004 HC RX CONTRAST MEDICATION: Performed by: ANESTHESIOLOGY

## 2021-03-25 PROCEDURE — 2580000003 HC RX 258: Performed by: ANESTHESIOLOGY

## 2021-03-25 PROCEDURE — 2709999900 HC NON-CHARGEABLE SUPPLY: Performed by: ANESTHESIOLOGY

## 2021-03-25 PROCEDURE — 2500000003 HC RX 250 WO HCPCS: Performed by: ANESTHESIOLOGY

## 2021-03-25 PROCEDURE — 99152 MOD SED SAME PHYS/QHP 5/>YRS: CPT | Performed by: ANESTHESIOLOGY

## 2021-03-25 PROCEDURE — 3600000050 HC PAIN LEVEL 1 BASE: Performed by: ANESTHESIOLOGY

## 2021-03-25 PROCEDURE — 81025 URINE PREGNANCY TEST: CPT

## 2021-03-25 RX ORDER — LIDOCAINE HYDROCHLORIDE 10 MG/ML
INJECTION, SOLUTION INFILTRATION; PERINEURAL PRN
Status: DISCONTINUED | OUTPATIENT
Start: 2021-03-25 | End: 2021-03-25 | Stop reason: ALTCHOICE

## 2021-03-25 RX ORDER — DEXAMETHASONE SODIUM PHOSPHATE 10 MG/ML
INJECTION INTRAMUSCULAR; INTRAVENOUS PRN
Status: DISCONTINUED | OUTPATIENT
Start: 2021-03-25 | End: 2021-03-25 | Stop reason: ALTCHOICE

## 2021-03-25 RX ORDER — MIDAZOLAM HYDROCHLORIDE 1 MG/ML
INJECTION INTRAMUSCULAR; INTRAVENOUS PRN
Status: DISCONTINUED | OUTPATIENT
Start: 2021-03-25 | End: 2021-03-25 | Stop reason: ALTCHOICE

## 2021-03-25 RX ORDER — SODIUM CHLORIDE 0.9 % (FLUSH) 0.9 %
10 SYRINGE (ML) INJECTION PRN
Status: DISCONTINUED | OUTPATIENT
Start: 2021-03-25 | End: 2021-03-25 | Stop reason: HOSPADM

## 2021-03-25 RX ORDER — FENTANYL CITRATE 50 UG/ML
INJECTION, SOLUTION INTRAMUSCULAR; INTRAVENOUS PRN
Status: DISCONTINUED | OUTPATIENT
Start: 2021-03-25 | End: 2021-03-25 | Stop reason: ALTCHOICE

## 2021-03-25 RX ORDER — SODIUM CHLORIDE 0.9 % (FLUSH) 0.9 %
10 SYRINGE (ML) INJECTION EVERY 12 HOURS SCHEDULED
Status: DISCONTINUED | OUTPATIENT
Start: 2021-03-25 | End: 2021-03-25 | Stop reason: HOSPADM

## 2021-03-25 RX ADMIN — SODIUM CHLORIDE, PRESERVATIVE FREE 10 ML: 5 INJECTION INTRAVENOUS at 09:25

## 2021-03-25 ASSESSMENT — PAIN DESCRIPTION - ORIENTATION
ORIENTATION: LOWER
ORIENTATION: LOWER

## 2021-03-25 ASSESSMENT — PAIN DESCRIPTION - LOCATION: LOCATION: BACK

## 2021-03-25 ASSESSMENT — PAIN DESCRIPTION - FREQUENCY: FREQUENCY: CONTINUOUS

## 2021-03-25 ASSESSMENT — PAIN DESCRIPTION - DESCRIPTORS: DESCRIPTORS: ACHING

## 2021-03-25 ASSESSMENT — PAIN SCALES - GENERAL: PAINLEVEL_OUTOF10: 5

## 2021-03-25 NOTE — OP NOTE
Operative Note      Patient: Lyn Ortega  YOB: 1990  MRN: [id number]    Date of Procedure: 3/25/2021    Pre-Op Diagnosis: LUMBAR FACET ARTHROPATHY  CHRONIC CODY LOW BACK PAIN W/O SCIATICA    Post-Op Diagnosis: Same       Procedure(s):  LUMBAR FACET STEROID INJECTION BILATERAL L4 / 5    Surgeon(s):  Ever Carpio MD    Assistant:   * No surgical staff found *    Anesthesia: IV Sedation    Estimated Blood Loss (mL): Minimal    Complications: None    Specimens:   * No specimens in log *    Implants:  * No implants in log *      Drains: * No LDAs found *    Findings: n/a    Detailed Description of Procedure:       Preoperative Diagnosis:    Chronic lower back pain without sciatica  Lumbar facet syndrome      Postoperative Diagnosis:   Chronic lower back pain without sciatica  Lumbar facet syndrome      Blood loss: none    Procedure Performed[de-identified]    Lumbar facet joint injections at the levels of L4-L5, on the Bilateral side with fluoroscopy guidance, with IV sedation. Procedure:     Using fluoroscopy the facet joints were identified and by adjusting the angle of the fluoroscopy, the view of the joint space was optimized. The skin and deep tissues over the joint space were anesthetized with 1 ml of 1 % lidocaine    The #22-gauge, spinal needle was introduced through the skin wheal under fluoroscopoc guidance such that the tip of the needle lies in the joint space. This was confirmed by injecting about 0.2 ml of Omnipaque-180 through the needle and observing the spread of the contrast along the joint space. Then after negative aspiration 1 ml from a treatment mixture made with 1ml of 0.5% Marcaine with 1 mL of dexamethasone 10 mg/mL was injected into the joint space. Procedure was first performed on right side and was then repeated on the left side at the same level with same technique    A total of 10 mg of dexamethasone was used and was divided in equal amounts among the joints.   After removing the needles Band-Aids were placed over the needle insertion sites. Patient's vital signs remained stable and tolerated the procedure well. The patient was discharged home in stable condition and will be followed in the pain clinic in the next few weeks for further planning. Electronically signed by Zarina Stuart MD on 3/25/2021 at 10:00 AM  SEDATION NOTE:    ASA CLASSIFICATION  2  MP   CLASSIFICATION  2    · Moderate intravenous conscious sedation was supervised by Dr. Ed Castillo  . The patient was independently monitored by a Registered Nurse assigned to the Procedure Room  . Monitoring included automated blood pressure, continuous EKG, Capnography and continuous pulse oximetry. . The detailed Conscious Record is permanently stored in the [employer].      The following is the conscious sedation record;  Start Time:  0946  End times:  0958  Duration:  12 minutes  MEDS GIVEN 2 MG VERSED AND 50 MCG FENTANYL

## 2021-03-25 NOTE — H&P
History and Physical Update    Pt Name: Tracy Beaulieu  MRN: [id number]  YOB: 1990  Date of evaluation: 3/25/2021      [x] I have reviewed the Progress Note by Dr Madina Howe dated 3/17/21 in epic which meets the criteria for an Interval History and Physical note and is attached below. [x] I have examined  Maira Villalba  There are no changes to the patient who is scheduled for a bilateral lumbar facet steroid injection L4/L5 by Dr Madina Howe for lumbar facet arthritis, chronic lower back pain w/o sciatica. Pain rated 5/10 The patient denies new health changes, fever, chills, wheezing, cough, increased SOB, chest pain, open sores or wounds. Vital signs: /79   Pulse 87   Temp 97.3 °F (36.3 °C) (Temporal)   Resp 18   Ht 5' 3\" (1.6 m)   Wt 255 lb 9.6 oz (115.9 kg)   SpO2 96%   BMI 45.28 kg/m²     Allergies:  Cat hair extract, Dust mite extract, Molds & smuts, and Seasonal    Medications:    Prior to Admission medications    Medication Sig Start Date End Date Taking?  Authorizing Provider   baclofen (LIORESAL) 10 MG tablet Take 1 tablet by mouth nightly 3/17/21 4/16/21 Yes Cynthia Eastman MD   [facility] 15 MG tablet Take 1 tablet by mouth daily 1/20/21 3/25/21 Yes Cynthia Eastman MD   FLUoxetine (PROZAC) 20 MG tablet TAKE 1 TABLET BY MOUTH ONCE DAILY 12/26/20  Yes Historical Provider, MD   Cariprazine HCl (VRAYLAR) 6 MG CAPS Take by mouth daily   Yes Historical Provider, MD   Elastic Bandages & Supports (LUMBAR BACK BRACE/SUPPORT PAD) MISC 1 Units by Does not apply route daily PNEUMATIC OFF LOADNG BACK BRACE 3/17/21 3/17/22  Cynthia Eastman MD   medroxyPROGESTERone (DEPO-PROVERA) 150 MG/ML injection INJECT 1 ML INTRAMUSCULARY ONCE EVERY THREE MONTHS 3/15/21   FUNMILAYO Mars CNM   Elastic Bandages & Supports (LUMBAR BACK BRACE/SUPPORT PAD) MISC 1 Units by Does not apply route daily 10/20/20 10/20/21  Cynthia Eastman MD   diclofenac (VOLTAREN) 75 MG EC tablet TAKE 1 TABLET BY MOUTH EVERY 12 HOURS AS NEEDED FOR BODY ACHES 9/6/19   Historical Provider, MD         This is a 31 y.o.morbidly obese female who is pleasant, cooperative, alert and oriented x3, in no acute distress. Heart: Heart sounds are normal.  HR 87 regular rate and rhythm without murmur, gallop or rub. Lungs: Normal respiratory effort with equal expansion, good air exchange, unlabored and clear to auscultation without wheezes or rales bilaterally   Abdomen: obese, soft, nontender, nondistended with bowel sounds. Extremities: equal bilateral lower extremity strength       Labs:  Recent Labs     03/25/21  0912   HCG NEGATIVE       No results for input(s): COVID19 in the last 720 hours. Yang MELLO ANP-BC  Electronically signed 3/25/2021 at 9:35 AM      Jeanette Wilkinson MD   Physician   Specialty:  Pain Management   Progress Notes   Signed   Encounter Date:  3/17/2021         Related encounter: Office Visit from 3/17/2021 in [facility] [place]         Signed        Expand AllCollapse All      The patient is a 31 y. o. Non-/non  female. Chief Complaint   Patient presents with    Back Pain         HPI     72-year-old female with history of chronic back and leg pain  Leg pain improved after epidural injection  Main issue is mid and lower back pain  Pain is constant aching nagging stiffness  Aggravated with routine activities  She did therapy in past  Have tried NSAIDs  Currently taking Prozac for depression  Have recent MRI lumbar spine available in epic  No changes in bladder or bowel control  Rates average pain score 5/10     Patient diagnosed with chronic bilateral low back pain with bilateral sciatica. Patient rates pain to back today as 5/10, the pain is intermittent ache and pressure. Aggravating factors include sitting and standing for prolonged periods. Alleviating factors:nothing. Patient states she does not need medication refills at this time.      Past Medical History        Past Medical History:   Diagnosis Date    ADHD      Anxiety      Back pain      [facility] injury [address] [address]      Depression      Hearing loss      Learning disability      Migraine headache           Past Surgical History         Past Surgical History:   Procedure Laterality Date    PAIN MANAGEMENT PROCEDURE Bilateral 2020     EPIDURAL STEROID INJECTION BILATERAL L5 performed by Rudy Sevilla MD at [facility] [address] Bilateral 2021     EPIDURAL STEROID INJECTION BILATERAL L5 performed by Rudy Se[facility]illa MD at [address]             Social History   Social History            Socioeconomic History    Marital status: Single       Spouse name: None    Number of children: None    Years of education: None    Highest education level: None   Occupational History    None   Social Needs    Financial resource strain: Not hard at all   [address] insecurity       Worry: Never true       Inability: Never true    Transportation needs       Medical: No       Non-medical: No   Tobacco Use    Smoking status: Former Smoker       Types: Cigarettes       Quit date: 2016       Years since quittin.2    Smokeless tobacco: Never Used   Substance and Sexual Activity    Alcohol use: No       Alcohol/week: 0.0 standard drinks    Drug use: No    Sexual activity: Yes   Lifestyle    Physical activity       Days per week: None       Minutes per session: None    Stress: None   Relationships    Social connections       Talks on phone: None       Gets together: None       Attends Buddhism service: None       Active member of club or organization: None       Attends meetings of clubs or organizations: None       Relationship status: None    Intimate partner violence       Fear of current or ex partner: None       Emotionally abused: None       Physically abused: None       Forced sexual activity: None   Other Topics Concern    None   Social History Narrative    None Family History         Family History   Problem Relation Age of Onset    Diabetes Mother      Heart Disease Father      Diabetes Father      Diabetes Maternal Grandmother      Diabetes Maternal Grandfather      Diabetes Paternal Grandmother      Diabetes Paternal Grandfather      Other Sister           sepsis in blood stream and pneumonia    Alcohol Abuse Brother      Drug Abuse Brother      Alcohol Abuse Brother      Drug Abuse Brother      Breast Cancer Neg Hx      Colon Cancer Neg Hx      Ovarian Cancer Neg Hx                Allergies   Allergen Reactions    Cat Hair Extract      Dust Mite Extract      Molds & Smuts      Seasonal        Cat hair extract, Dust mite extract, Molds & smuts, and Seasonal       Vitals:     03/17/21 1347   BP: 109/77   Pulse: 87   Temp: 98 °F (36.7 °C)   SpO2: 98%      Current Facility-Administered Medications          Current Outpatient Medications   Medication Sig Dispense Refill    Elastic Bandages & Supports (LUMBAR BACK BRACE/SUPPORT PAD) MISC 1 Units by Does not apply route daily PNEUMATIC OFF LOADNG BACK BRACE 1 each 0    baclofen (LIORESAL) 10 MG tablet Take 1 tablet by mouth nightly 30 tablet 1    meloxicam (MOBIC) 15 MG tablet Take 1 tablet by mouth daily 30 tablet 1    FLUoxetine (PROZAC) 20 MG tablet TAKE 1 TABLET BY MOUTH ONCE DAILY        medroxyPROGESTERone (DEPO-PROVERA) 150 MG/ML injection INJECT 1 ML INTRAMUSCULARY ONCE EVERY THREE MONTHS 1 mL 3    Cariprazine HCl (VRAYLAR) 6 MG CAPS Take by mouth daily        diclofenac (VOLTAREN) 75 MG EC tablet TAKE 1 TABLET BY MOUTH EVERY 12 HOURS AS NEEDED FOR BODY ACHES   4    Elastic Bandages & Supports (LUMBAR BACK BRACE/SUPPORT PAD) MISC 1 Units by Does not apply route daily 1 each 0      No current facility-administered medications for this visit. Review of Systems   Constitutional: Negative. Negative for fever. Respiratory: Negative.     Musculoskeletal: Positive for arthralgias, back pain, myalgias and neck pain. Neurological: Negative. Objective:  General Appearance:  Well-appearing, in no acute distress, uncomfortable and in pain. Vital signs: (most recent): Blood pressure 109/77, pulse 87, temperature 98 °F (36.7 °C), temperature source Infrared, height 5' 3\" (1.6 m), SpO2 98 %, not currently breastfeeding. Vital signs are normal.  No fever. Output: Producing urine and producing stool. HEENT: Normal HEENT exam.    Lungs:  Normal effort and normal respiratory rate. Breath sounds clear to auscultation. She is not in respiratory distress. Heart: Normal rate. Extremities: Normal range of motion. There is no deformity. Neurological: Patient is alert and oriented to person, place and time. Patient has normal coordination. Pupils:  Pupils are equal, round, and reactive to light. Pupils are equal.   Skin:  Warm and dry. No rash or cyanosis. Lumbar spine examination  No apparent deformity on inspection  Range of motion is limited and associated with pain  Positive tenderness to palpation of bilateral lower lumbar paraspinal muscle and facet joint  Facet loading maneuver positive  Gait stable     Assessment & Plan  1. Lumbar facet arthropathy    2. Chronic bilateral low back pain without sciatica    3.  Lumbar disc herniation            Orders Placed This Encounter   Procedures    FACET JOINT L/S SINGLE    Ambulatory referral to Physical Therapy      Encounter Medications         Orders Placed This Encounter   Medications    Elastic Bandages & Supports (LUMBAR BACK BRACE/SUPPORT PAD) MISC       Si Units by Does not apply route daily PNEUMATIC OFF LOADNG BACK BRACE       Dispense:  1 each       Refill:  0    baclofen (LIORESAL) 10 MG tablet       Sig: Take 1 tablet by mouth nightly       Dispense:  30 tablet       Refill:  1               Electronically signed by Bhanu Granados MD on 3/17/2021 at 2:28 PM               Revision History

## 2021-04-05 PROBLEM — G89.29 CHRONIC BILATERAL LOW BACK PAIN WITHOUT SCIATICA: Status: ACTIVE | Noted: 2019-12-04

## 2021-04-05 PROBLEM — F31.9 BIPOLAR 1 DISORDER, DEPRESSED (HCC): Status: ACTIVE | Noted: 2019-10-22

## 2021-04-05 PROBLEM — M19.90 ARTHRITIS: Status: ACTIVE | Noted: 2021-04-05

## 2021-04-05 PROBLEM — M54.50 CHRONIC BILATERAL LOW BACK PAIN WITHOUT SCIATICA: Status: ACTIVE | Noted: 2019-12-04

## 2021-04-05 PROBLEM — M25.569 KNEE PAIN: Status: ACTIVE | Noted: 2021-04-05

## 2021-04-11 ENCOUNTER — HOSPITAL ENCOUNTER (OUTPATIENT)
Dept: LAB | Age: 31
Setting detail: SPECIMEN
Discharge: HOME | End: 2021-04-11
Payer: MEDICARE

## 2021-04-11 ENCOUNTER — HOSPITAL ENCOUNTER (OUTPATIENT)
Age: 31
Setting detail: SPECIMEN
Discharge: HOME | End: 2021-04-11
Payer: MEDICARE

## 2021-04-11 DIAGNOSIS — Z01.818 PRE-OP TESTING: ICD-10-CM

## 2021-04-11 DIAGNOSIS — Z01.818 PRE-OP TESTING: Primary | ICD-10-CM

## 2021-04-12 LAB
SARS-COV-2: NORMAL
SARS-COV-2: NOT DETECTED
SOURCE: NORMAL

## 2021-04-13 ENCOUNTER — ANESTHESIA EVENT (OUTPATIENT)
Dept: OPERATING ROOM | Age: 31
End: 2021-04-13
Payer: MEDICARE

## 2021-04-13 ENCOUNTER — TELEPHONE (OUTPATIENT)
Dept: PRIMARY CARE CLINIC | Age: 31
End: 2021-04-13

## 2021-04-14 NOTE — PROGRESS NOTES
DAY OF SURGERY/PROCEDURE  GUIDELINES    As a patient at the [facility] you can expect quality medical and nursing care that is centered on your individual needs. It is our goal to make your surgical experience as comfortable and excellent as possible.  ________________________________________________________________________    The following instructions are general guidelines, if any information on this sheet is different from what your doctor has instructed you to do, please follow your doctor's instructions. · Please arrive on time or your procedure may be rescheduled  · Enter through front entrance of the building. Surgery Center will be located to your right. · Upon arrival you will be taken to the pre-operative area to get ready for surgery, your family will stay in the waiting room and visit with you once you are ready for surgery. Due to special limitations please limit visitation to 1-2 members of your family at a time. When it is time for surgery your family will return to the waiting room. · You will be given a specific time when you will NOT be able to eat, drink, smoke, suck or chew ANYTHING (no water, gum, mints, cigarettes, cigars, pipes, snuff, chewing tobacco, etc.) or your surgery may be canceled. This will occur during your PAT appointment or by phone 1-2 days before surgery  · Take a shower or bath on the morning of your surgery/procedure (Hibiclens if directed)  · Brush your teeth, but do not swallow any water  · IN CASE OF ILLNESS - If you have a cold or flu symptoms (high fever, runny nose, sore throat, cough, etc.) rash, nausea, vomiting, loose stools, and/or recent contact with someone who has a contagious disease (chick pox, measles, etc.) please call your doctor before coming to the surgery center  · Take a small sip of water with heart, blood pressure, and/or seizure medication the morning of surgery.  (DO NOT take blood pressure medications that contain a diuretic)  · If applicable bring your:  · Inhaler (s)  · Hearing aid(s)  · Eyeglasses and Case (If you wear contacts they have to be removed before surgery, bring case and solution)  · Any paperwork given to you by your doctor  · Any X-rays you were told to bring  · A copy of your Living Will or Durable Power of   · DO NOT take anticoagulants (blood thinners, aspirin or aspirin-containing products) two weeks prior to your surgery. You may start taking again 2 days post-operatively, unless otherwise directed by your doctor. · DO NOT take any diabetic pills or insulin. Please bring your sliding scale instructions and sick day plan with you. If you have a low blood sugar reaction after midnight, drink 4 ounces of apple juice or regular pop. · Wear loose, comfortable clothing that is easy to put on and take off. A locker will be provided to store your clothing during the procedure. The key can be given to a family member/friend or it will remain in post-op with the nurse. · You will be returning home the same day as your surgery, you will need to have a responsible adult (25years of age or older) present to drive you home. You will need someone stay with you at home for the first 24 hours following your surgery. This is due to the anesthesia and the medication given to you during surgery and recovery. · Your doctor may talk with your family immediately following your procedure. Depending on your needs, you will stay in the recovery room between 30 minutes to 2 hours. · While you are recovering, the surgery family waiting room  can answer many of your family's questions. All medically related questions will be answered by a medical professional. If you had outpatient surgery, you will meet your family for discharge instructions before leaving.

## 2021-04-15 ENCOUNTER — HOSPITAL ENCOUNTER (OUTPATIENT)
Age: 31
Setting detail: OUTPATIENT SURGERY
Discharge: HOME | End: 2021-04-15
Attending: SURGERY | Admitting: SURGERY
Payer: MEDICARE

## 2021-04-15 ENCOUNTER — ANESTHESIA (OUTPATIENT)
Dept: OPERATING ROOM | Age: 31
End: 2021-04-15
Payer: MEDICARE

## 2021-04-15 VITALS
OXYGEN SATURATION: 98 % | DIASTOLIC BLOOD PRESSURE: 85 MMHG | SYSTOLIC BLOOD PRESSURE: 116 MMHG | TEMPERATURE: 97.3 F | RESPIRATION RATE: 18 BRPM | WEIGHT: 254.2 LBS | HEIGHT: 63 IN | BODY MASS INDEX: 45.04 KG/M2 | HEART RATE: 82 BPM

## 2021-04-15 VITALS
OXYGEN SATURATION: 99 % | SYSTOLIC BLOOD PRESSURE: 107 MMHG | DIASTOLIC BLOOD PRESSURE: 67 MMHG | RESPIRATION RATE: 10 BRPM

## 2021-04-15 DIAGNOSIS — G89.18 ACUTE POSTOPERATIVE PAIN: Primary | ICD-10-CM

## 2021-04-15 LAB — HCG, PREGNANCY URINE (POC): NEGATIVE

## 2021-04-15 PROCEDURE — 7100000000 HC PACU RECOVERY - FIRST 15 MIN: Performed by: SURGERY

## 2021-04-15 PROCEDURE — 6360000002 HC RX W HCPCS: Performed by: SURGERY

## 2021-04-15 PROCEDURE — 6370000000 HC RX 637 (ALT 250 FOR IP)

## 2021-04-15 PROCEDURE — 6360000002 HC RX W HCPCS: Performed by: NURSE ANESTHETIST, CERTIFIED REGISTERED

## 2021-04-15 PROCEDURE — 88304 TISSUE EXAM BY PATHOLOGIST: CPT

## 2021-04-15 PROCEDURE — 2709999900 HC NON-CHARGEABLE SUPPLY: Performed by: SURGERY

## 2021-04-15 PROCEDURE — 7100000010 HC PHASE II RECOVERY - FIRST 15 MIN: Performed by: SURGERY

## 2021-04-15 PROCEDURE — 2500000003 HC RX 250 WO HCPCS: Performed by: SURGERY

## 2021-04-15 PROCEDURE — 2500000003 HC RX 250 WO HCPCS: Performed by: NURSE ANESTHETIST, CERTIFIED REGISTERED

## 2021-04-15 PROCEDURE — 3600000012 HC SURGERY LEVEL 2 ADDTL 15MIN: Performed by: SURGERY

## 2021-04-15 PROCEDURE — 2580000003 HC RX 258: Performed by: ANESTHESIOLOGY

## 2021-04-15 PROCEDURE — 81025 URINE PREGNANCY TEST: CPT

## 2021-04-15 PROCEDURE — 3600000002 HC SURGERY LEVEL 2 BASE: Performed by: SURGERY

## 2021-04-15 PROCEDURE — 7100000011 HC PHASE II RECOVERY - ADDTL 15 MIN: Performed by: SURGERY

## 2021-04-15 PROCEDURE — 3700000000 HC ANESTHESIA ATTENDED CARE: Performed by: SURGERY

## 2021-04-15 PROCEDURE — 3700000001 HC ADD 15 MINUTES (ANESTHESIA): Performed by: SURGERY

## 2021-04-15 RX ORDER — BUPIVACAINE HYDROCHLORIDE AND EPINEPHRINE 2.5; 5 MG/ML; UG/ML
INJECTION, SOLUTION INFILTRATION; PERINEURAL PRN
Status: DISCONTINUED | OUTPATIENT
Start: 2021-04-15 | End: 2021-04-15 | Stop reason: ALTCHOICE

## 2021-04-15 RX ORDER — SODIUM CHLORIDE 9 MG/ML
25 INJECTION, SOLUTION INTRAVENOUS PRN
Status: DISCONTINUED | OUTPATIENT
Start: 2021-04-15 | End: 2021-04-15 | Stop reason: HOSPADM

## 2021-04-15 RX ORDER — SODIUM CHLORIDE, SODIUM LACTATE, POTASSIUM CHLORIDE, CALCIUM CHLORIDE 600; 310; 30; 20 MG/100ML; MG/100ML; MG/100ML; MG/100ML
INJECTION, SOLUTION INTRAVENOUS CONTINUOUS
Status: DISCONTINUED | OUTPATIENT
Start: 2021-04-15 | End: 2021-04-15 | Stop reason: HOSPADM

## 2021-04-15 RX ORDER — LIDOCAINE HYDROCHLORIDE 10 MG/ML
INJECTION, SOLUTION EPIDURAL; INFILTRATION; INTRACAUDAL; PERINEURAL PRN
Status: DISCONTINUED | OUTPATIENT
Start: 2021-04-15 | End: 2021-04-15 | Stop reason: SDUPTHER

## 2021-04-15 RX ORDER — HYDROCODONE BITARTRATE AND ACETAMINOPHEN 5; 325 MG/1; MG/1
1 TABLET ORAL PRN
Status: COMPLETED | OUTPATIENT
Start: 2021-04-15 | End: 2021-04-15

## 2021-04-15 RX ORDER — MORPHINE SULFATE 1 MG/ML
1 INJECTION, SOLUTION EPIDURAL; INTRATHECAL; INTRAVENOUS EVERY 5 MIN PRN
Status: DISCONTINUED | OUTPATIENT
Start: 2021-04-15 | End: 2021-04-15 | Stop reason: HOSPADM

## 2021-04-15 RX ORDER — PROPOFOL 10 MG/ML
INJECTION, EMULSION INTRAVENOUS CONTINUOUS PRN
Status: DISCONTINUED | OUTPATIENT
Start: 2021-04-15 | End: 2021-04-15 | Stop reason: SDUPTHER

## 2021-04-15 RX ORDER — FENTANYL CITRATE 50 UG/ML
INJECTION, SOLUTION INTRAMUSCULAR; INTRAVENOUS PRN
Status: DISCONTINUED | OUTPATIENT
Start: 2021-04-15 | End: 2021-04-15 | Stop reason: SDUPTHER

## 2021-04-15 RX ORDER — PROMETHAZINE HYDROCHLORIDE 25 MG/ML
6.25 INJECTION, SOLUTION INTRAMUSCULAR; INTRAVENOUS
Status: DISCONTINUED | OUTPATIENT
Start: 2021-04-15 | End: 2021-04-15 | Stop reason: HOSPADM

## 2021-04-15 RX ORDER — MEPERIDINE HYDROCHLORIDE 50 MG/ML
12.5 INJECTION INTRAMUSCULAR; INTRAVENOUS; SUBCUTANEOUS EVERY 5 MIN PRN
Status: DISCONTINUED | OUTPATIENT
Start: 2021-04-15 | End: 2021-04-15 | Stop reason: HOSPADM

## 2021-04-15 RX ORDER — PROPOFOL 10 MG/ML
INJECTION, EMULSION INTRAVENOUS PRN
Status: DISCONTINUED | OUTPATIENT
Start: 2021-04-15 | End: 2021-04-15 | Stop reason: SDUPTHER

## 2021-04-15 RX ORDER — BUPIVACAINE HYDROCHLORIDE 2.5 MG/ML
INJECTION, SOLUTION EPIDURAL; INFILTRATION; INTRACAUDAL
Status: DISCONTINUED
Start: 2021-04-15 | End: 2021-04-15 | Stop reason: WASHOUT

## 2021-04-15 RX ORDER — DIPHENHYDRAMINE HYDROCHLORIDE 50 MG/ML
12.5 INJECTION INTRAMUSCULAR; INTRAVENOUS
Status: DISCONTINUED | OUTPATIENT
Start: 2021-04-15 | End: 2021-04-15 | Stop reason: HOSPADM

## 2021-04-15 RX ORDER — MIDAZOLAM HYDROCHLORIDE 1 MG/ML
INJECTION INTRAMUSCULAR; INTRAVENOUS PRN
Status: DISCONTINUED | OUTPATIENT
Start: 2021-04-15 | End: 2021-04-15 | Stop reason: SDUPTHER

## 2021-04-15 RX ORDER — SODIUM CHLORIDE 9 MG/ML
INJECTION, SOLUTION INTRAVENOUS CONTINUOUS
Status: DISCONTINUED | OUTPATIENT
Start: 2021-04-15 | End: 2021-04-15 | Stop reason: HOSPADM

## 2021-04-15 RX ORDER — SODIUM CHLORIDE 0.9 % (FLUSH) 0.9 %
10 SYRINGE (ML) INJECTION EVERY 12 HOURS SCHEDULED
Status: DISCONTINUED | OUTPATIENT
Start: 2021-04-15 | End: 2021-04-15 | Stop reason: HOSPADM

## 2021-04-15 RX ORDER — HYDROCODONE BITARTRATE AND ACETAMINOPHEN 5; 325 MG/1; MG/1
TABLET ORAL
Status: COMPLETED
Start: 2021-04-15 | End: 2021-04-15

## 2021-04-15 RX ORDER — FENTANYL CITRATE 50 UG/ML
25 INJECTION, SOLUTION INTRAMUSCULAR; INTRAVENOUS EVERY 5 MIN PRN
Status: DISCONTINUED | OUTPATIENT
Start: 2021-04-15 | End: 2021-04-15 | Stop reason: HOSPADM

## 2021-04-15 RX ORDER — CEPHALEXIN 500 MG/1
500 CAPSULE ORAL 3 TIMES DAILY
Qty: 21 CAPSULE | Refills: 0 | Status: SHIPPED | OUTPATIENT
Start: 2021-04-15 | End: 2021-04-22

## 2021-04-15 RX ORDER — SODIUM CHLORIDE 0.9 % (FLUSH) 0.9 %
10 SYRINGE (ML) INJECTION PRN
Status: DISCONTINUED | OUTPATIENT
Start: 2021-04-15 | End: 2021-04-15 | Stop reason: HOSPADM

## 2021-04-15 RX ORDER — OXYCODONE HYDROCHLORIDE AND ACETAMINOPHEN 5; 325 MG/1; MG/1
1 TABLET ORAL EVERY 6 HOURS PRN
Qty: 12 TABLET | Refills: 0 | Status: SHIPPED | OUTPATIENT
Start: 2021-04-15 | End: 2021-04-18

## 2021-04-15 RX ORDER — BUPIVACAINE HYDROCHLORIDE AND EPINEPHRINE 2.5; 5 MG/ML; UG/ML
INJECTION, SOLUTION INFILTRATION; PERINEURAL
Status: DISCONTINUED
Start: 2021-04-15 | End: 2021-04-15 | Stop reason: HOSPADM

## 2021-04-15 RX ORDER — HYDRALAZINE HYDROCHLORIDE 20 MG/ML
5 INJECTION INTRAMUSCULAR; INTRAVENOUS EVERY 10 MIN PRN
Status: DISCONTINUED | OUTPATIENT
Start: 2021-04-15 | End: 2021-04-15 | Stop reason: HOSPADM

## 2021-04-15 RX ORDER — ONDANSETRON 2 MG/ML
4 INJECTION INTRAMUSCULAR; INTRAVENOUS
Status: DISCONTINUED | OUTPATIENT
Start: 2021-04-15 | End: 2021-04-15 | Stop reason: HOSPADM

## 2021-04-15 RX ORDER — HYDROCODONE BITARTRATE AND ACETAMINOPHEN 5; 325 MG/1; MG/1
2 TABLET ORAL PRN
Status: COMPLETED | OUTPATIENT
Start: 2021-04-15 | End: 2021-04-15

## 2021-04-15 RX ADMIN — SODIUM CHLORIDE, POTASSIUM CHLORIDE, SODIUM LACTATE AND CALCIUM CHLORIDE: 600; 310; 30; 20 INJECTION, SOLUTION INTRAVENOUS at 12:57

## 2021-04-15 RX ADMIN — CEFAZOLIN 2000 MG: 10 INJECTION, POWDER, FOR SOLUTION INTRAVENOUS at 12:12

## 2021-04-15 RX ADMIN — SODIUM CHLORIDE, POTASSIUM CHLORIDE, SODIUM LACTATE AND CALCIUM CHLORIDE: 600; 310; 30; 20 INJECTION, SOLUTION INTRAVENOUS at 12:00

## 2021-04-15 RX ADMIN — LIDOCAINE HYDROCHLORIDE 50 MG: 10 INJECTION, SOLUTION EPIDURAL; INFILTRATION; INTRACAUDAL; PERINEURAL at 12:09

## 2021-04-15 RX ADMIN — PROPOFOL 100 MCG/KG/MIN: 10 INJECTION, EMULSION INTRAVENOUS at 12:09

## 2021-04-15 RX ADMIN — HYDROCODONE BITARTRATE AND ACETAMINOPHEN 1 TABLET: 5; 325 TABLET ORAL at 14:02

## 2021-04-15 RX ADMIN — FENTANYL CITRATE 25 MCG: 50 INJECTION, SOLUTION INTRAMUSCULAR; INTRAVENOUS at 12:58

## 2021-04-15 RX ADMIN — FENTANYL CITRATE 100 MCG: 50 INJECTION, SOLUTION INTRAMUSCULAR; INTRAVENOUS at 12:09

## 2021-04-15 RX ADMIN — PROPOFOL 30 MG: 10 INJECTION, EMULSION INTRAVENOUS at 12:35

## 2021-04-15 RX ADMIN — MIDAZOLAM HYDROCHLORIDE 2 MG: 1 INJECTION, SOLUTION INTRAMUSCULAR; INTRAVENOUS at 12:06

## 2021-04-15 RX ADMIN — FENTANYL CITRATE 25 MCG: 50 INJECTION, SOLUTION INTRAMUSCULAR; INTRAVENOUS at 13:05

## 2021-04-15 RX ADMIN — FENTANYL CITRATE 50 MCG: 50 INJECTION, SOLUTION INTRAMUSCULAR; INTRAVENOUS at 13:14

## 2021-04-15 RX ADMIN — PROPOFOL 50 MG: 10 INJECTION, EMULSION INTRAVENOUS at 12:52

## 2021-04-15 RX ADMIN — PROPOFOL 75 MCG/KG/MIN: 10 INJECTION, EMULSION INTRAVENOUS at 12:40

## 2021-04-15 RX ADMIN — PROPOFOL 30 MG: 10 INJECTION, EMULSION INTRAVENOUS at 12:47

## 2021-04-15 RX ADMIN — PROPOFOL 70 MG: 10 INJECTION, EMULSION INTRAVENOUS at 12:11

## 2021-04-15 ASSESSMENT — PULMONARY FUNCTION TESTS
PIF_VALUE: 1

## 2021-04-15 ASSESSMENT — PAIN DESCRIPTION - LOCATION: LOCATION: BACK

## 2021-04-15 ASSESSMENT — PAIN SCALES - GENERAL
PAINLEVEL_OUTOF10: 4
PAINLEVEL_OUTOF10: 4

## 2021-04-15 ASSESSMENT — PAIN DESCRIPTION - PAIN TYPE: TYPE: SURGICAL PAIN

## 2021-04-15 ASSESSMENT — PAIN - FUNCTIONAL ASSESSMENT: PAIN_FUNCTIONAL_ASSESSMENT: 0-10

## 2021-04-15 NOTE — OP NOTE
Operative Note      Patient: Ivory Bobo  YOB: 1990  MRN: [id number]    Date of Procedure: 4/15/2021    Pre-Op Diagnosis: LEFT BACK EPIDERMAL CYSTS X 2      Post-Op Diagnosis: Same WITH RECENT INFLAMMATION       Procedure(s):  MID BACK CYST LESION BIOPSY EXCISION x 2     Surgeon(s):  Lolita Alcantar DO    Assistant:   Resident: Reji Kwan DO; Bharati Corcoran MD    Anesthesia: Monitor Anesthesia Care    Estimated Blood Loss (mL): Less than 86MO    Complications: None    Specimens:   ID Type Source Tests Collected by Time Destination   A : Back cysts x2  Tissue Tissue [address], DO 4/15/2021 1251      Implants:  * No implants in log *      Drains: * No LDAs found *    Findings: Removal of two cysts upper midback. Detailed Description of Procedure:     HISTORY: The patient is a 31y.o. year old female with history of two symptomatic cyst of the upper midback. The area has caused her discomfort. Decision was made to formally excise the cysts in the operating room. Risks, benefits, expected outcome, and alternatives to the procedure were explained and all questions answered. Signed consent was obtained and the patient was prepped for procedure. PROCEDURE: The patient was brought into the operating suite and a time out was performed identifying the patient and procedure. The patient was then placed in the left lateral decubitus position. MAC anesthesia was administered by the anesthesia team and IV antibiotics were given. The area of concern was identified on the mid upper back and two cystic areas was palpated. The area was prepped with betadine and draped in a sterile fashion. 0.25% marcaine with epinephrine was used to locally anesthetize the area. A 15 blade was used to make a 4cm vertical incision overlying the area of the cysts. The incision was carried down through the dermis using sharp dissection with bovie electrocautery and a 15 blade scalpel.

## 2021-04-15 NOTE — ANESTHESIA PRE PROCEDURE
flush 0.9 % injection 10 mL  10 mL Intravenous PRN Magalys Beatty MD        0.9 % sodium chloride infusion  25 mL Intravenous PRN Magalys Beatty MD           Allergies:     Allergies   Allergen Reactions    Cat Hair Extract     Dust Mite Extract     Molds & Smuts     Seasonal        Problem List:    Patient Active Problem List   Diagnosis Code    Irregular bleeding N92.6    Back pain, chronic M54.9, G89.29    Chronic bilateral low back pain without sciatica M54.5, G89.29    Encounter for Nexplanon removal Z30.46    Encounter for initial prescription of injectable contraceptive Z30.013    Lumbar disc herniation M51.26    Left lumbar radiculitis M54.16    Arthritis M19.90    Attention or concentration deficit R41.840    Bipolar 1 disorder, depressed ([facility]) F31.9    Knee pain M25.569       Past Medical History:        Diagnosis Date    ADHD     Anxiety     Back pain     [facility] injury ([facility] [address]     Depression     Hearing loss     Learning disability     Migraine headache     Obese        Past Surgical History:        Procedure Laterality Date    OTHER SURGICAL HISTORY Bilateral 2021    lumbar DENIZ    PAIN MANAGEMENT PROCEDURE Bilateral 2020    EPIDURAL STEROID INJECTION BILATERAL L5 performed by Carlene Pedroza MD at [place] [address] Bilateral 2021    EPIDURAL STEROID INJECTION BILATERAL L5 performed by Carlene Pedroza MD at [place] [address] Bilateral 3/25/2021    LUMBAR FACET STEROID INJECTION BILATERAL L4 / 5 performed by Carlene Pedroza MD at [address] [place] [address]         Social History:    Social History     Tobacco Use    Smoking status: Former Smoker     Types: Cigarettes     Quit date: 2016     Years since quittin.2    Smokeless tobacco: Never Used   Substance Use Topics    Alcohol use: Never     Alcohol/week: 0.0 standard drinks     Frequency: Never                                Counseling given: Not Answered      Vital Signs (Current): There were no vitals filed for this visit. BP Readings from Last 3 Encounters:   03/25/21 101/76   03/17/21 109/77   01/20/21 99/72       NPO Status:                                                                                 BMI:   Wt Readings from Last 3 Encounters:   04/05/21 254 lb (115.2 kg)   03/25/21 255 lb 9.6 oz (115.9 kg)   01/20/21 246 lb (111.6 kg)     There is no height or weight on file to calculate BMI.    CBC:   Lab Results   Component Value Date    WBC 11.0 09/28/2020    RBC 5.69 09/28/2020    RBC 5.33 11/09/2019    HGB 14.6 09/28/2020    HCT 46.8 09/28/2020    MCV 82.2 09/28/2020    RDW 14.2 09/28/2020     09/28/2020       CMP:   Lab Results   Component Value Date     09/28/2020    K 4.1 09/28/2020     09/28/2020    CO2 21 09/28/2020    BUN 13 09/28/2020    CREATININE 0.74 09/28/2020    GFRAA >60 09/28/2020    LABGLOM >60 09/28/2020    GLUCOSE 90 09/28/2020    GLUCOSE 120 11/09/2019    PROT 7.2 09/28/2020    PROT 6.8 02/23/2016    CALCIUM 9.2 09/28/2020    BILITOT <0.10 09/28/2020    ALKPHOS 69 09/28/2020    AST 16 09/28/2020    ALT 22 09/28/2020       POC Tests: No results for input(s): POCGLU, POCNA, POCK, POCCL, POCBUN, POCHEMO, POCHCT in the last 72 hours.     Coags: No results found for: PROTIME, INR, APTT    HCG (If Applicable): Negative 2/33/25  Lab Results   Component Value Date    PREGTESTUR Negative 09/10/2019    HCG NEGATIVE 03/25/2021        ABGs: No results found for: PHART, PO2ART, EBE8VWD, HAZ4FXM, BEART, [id number]     Type & Screen (If Applicable):  No results found for: LABABO, LABRH    Drug/Infectious Status (If Applicable):  No results found for: HIV, HEPCAB    COVID-19 Screening (If Applicable):   Lab Results   Component Value Date    COVID19 Not Detected 04/11/2021           Anesthesia Evaluation  Patient summary reviewed and Nursing notes reviewed no history of anesthetic complications:   Airway: Mallampati: III  TM distance: >3 FB   Neck ROM: full  Mouth opening: > = 3 FB Dental: normal exam         Pulmonary:normal exam  breath sounds clear to auscultation                             Cardiovascular:Negative CV ROS            Rhythm: regular  Rate: normal                    Neuro/Psych:   (+) neuromuscular disease:, headaches: migraine headaches, psychiatric history: stable with treatmentdepression/anxiety             GI/Hepatic/Renal:   (+) morbid obesity          Endo/Other:    (+) : arthritis: OA., .                  ROS comment: BACK CYST Abdominal:   (+) obese,     Abdomen: soft. Vascular: negative vascular ROS. Anesthesia Plan      MAC and general     ASA 3     (ASA 3 morbid obesity)        Anesthetic plan and risks discussed with patient. Plan discussed with CRNA.                   Leopoldo Lofts, MD   4/15/2021

## 2021-04-15 NOTE — ANESTHESIA POSTPROCEDURE EVALUATION
POST- ANESTHESIA EVALUATION       Pt Name: Korin Masterson  MRN: [id number]  [place]: 1990  Date of evaluation: 4/15/2021  Time:  3:06 PM      /85   Pulse 82   Temp 97.3 °F (36.3 °C) (Temporal)   Resp 18   Ht 5' 3\" (1.6 m)   Wt 254 lb 3.2 oz (115.3 kg)   SpO2 98%   BMI 45.03 kg/m²      Consciousness Level  Awake  Cardiopulmonary Status  Stable  Pain Adequately Treated YES  Nausea / Vomiting  NO  Adequate Hydration  YES  Anesthesia Related Complications NONE      Electronically signed by Jack Campos MD on 4/15/2021 at 3:06 PM       Department of Anesthesiology  Postprocedure Note    Patient: Korin Masterson  MRN: [id number]  [place]: 1990  Date of evaluation: 4/15/2021  Time:  3:06 PM     Procedure Summary     Date: 04/15/21 Room / Location: [address]    Anesthesia Start: 3588 Anesthesia Stop: 1482    Procedure: MID BACK CYST LESION BIOPSY EXCISION (N/A ) Diagnosis: (L72.3 BACK CYST)    Surgeons: Juanjo Anderson DO Responsible Provider: Jack Campos MD    Anesthesia Type: MAC, general ASA Status: 3          Anesthesia Type: MAC, general    Rufina Phase I: Rufina Score: 6    Rufina Phase II: Rufina Score: 10    Last vitals: Reviewed and per EMR flowsheets.        Anesthesia Post Evaluation

## 2021-04-15 NOTE — H&P
H&P  General Surgery      Pt Name: Kaiden Orellana  MRN: [id number]  [place]: 1990  Date of evaluation: 4/15/2021      [x] I have examined the patient and reviewed the H&P/Consult completed 4/5/2021 and there are no changes to the patient or plans. [] I have examined the patient and reviewed the H&P/Consult and have noted the following changes:     I have included the previous office H&P below:      [address] [facility] is a pleasant 30 yo female with a symptomatic cyst of the upper mid back. It has been irritated and mildly infected in the past.  It has been there for several years. She doesn't think there was ever any drainage.     Body mass index is 44.99 kg/m².       Vitals:     04/05/21 1308   Pulse: 70   Resp: 16   SpO2: 98%           Allergies   Allergen Reactions    Cat Hair Extract      Dust Mite Extract      Molds & Smuts      Seasonal        Family History         Family History   Problem Relation Age of Onset    Diabetes Mother      Heart Disease Father      Diabetes Father      Diabetes Maternal Grandmother      Diabetes Maternal Grandfather      Diabetes Paternal Grandmother      Diabetes Paternal Grandfather      Other Sister           sepsis in blood stream and pneumonia    Alcohol Abuse Brother      Drug Abuse Brother      Alcohol Abuse Brother      Drug Abuse Brother      Breast Cancer Neg Hx      Colon Cancer Neg Hx      Ovarian Cancer Neg Hx           Social History               Socioeconomic History    Marital status: Single       Spouse name: Not on file    Number of children: Not on file    Years of education: Not on file    Highest education level: Not on file   Occupational History    Not on file   Social Needs    Financial resource strain: Not hard at all   [place] insecurity       Worry: Never true       Inability: Never true    Transportation needs       Medical: No       Non-medical: No   Tobacco Use    Smoking status: Former Smoker       Types: Cigarettes       Quit date: 2016       Years since quittin.2    Smokeless tobacco: Never Used   Substance and Sexual Activity    Alcohol use:  No       Alcohol/week: 0.0 standard drinks    Drug use: No    Sexual activity: Yes   Lifestyle    Physical activity       Days per week: Not on file       Minutes per session: Not on file    Stress: Not on file   Relationships    Social connections       Talks on phone: Not on file       Gets together: Not on file       Attends Sikh service: Not on file       Active member of club or organization: Not on file       Attends meetings of clubs or organizations: Not on file       Relationship status: Not on file    Intimate partner violence       Fear of current or ex partner: Not on file       Emotionally abused: Not on file       Physically abused: Not on file       Forced sexual activity: Not on file   Other Topics Concern    Not on file   Social History Narrative    Not on file         Social History           Substance and Sexual Activity   Alcohol Use No    Alcohol/week: 0.0 standard drinks      Social History           Tobacco Use   Smoking Status Former Smoker    Types: Cigarettes    Quit date: 2016    Years since quittin.2   Smokeless Tobacco Never Used      Social History          Substance and Sexual Activity   Drug Use No      Past Medical History        Past Medical History:   Diagnosis Date    ADHD      Anxiety      Back pain      Brain injury ([facility] [address]      Depression      Hearing loss      Learning disability      Migraine headache           Past Surgical History         Past Surgical History:   Procedure Laterality Date    OTHER SURGICAL HISTORY Bilateral 2021     lumbar DENIZ    PAIN MANAGEMENT PROCEDURE Bilateral 2020     EPIDURAL STEROID INJECTION BILATERAL L5 performed by Naomi Rowland MD at [address] [place] [address] Bilateral 2021     EPIDURAL STEROID INJECTION BILATERAL L5 performed by Tapan Lopes MD at [address] Bilateral 3/25/2021     LUMBAR FACET STEROID INJECTION BILATERAL L4 / 5 performed by Tapan Lopes MD at [facility] EXTRACTION                 Patient Active Problem List   Diagnosis    Irregular bleeding    Back pain, chronic    Chronic bilateral low back pain without sciatica    Encounter for Nexplanon removal    Encounter for initial prescription of injectable contraceptive    Lumbar disc herniation    Left lumbar radiculitis    Arthritis    Attention or concentration deficit    Bipolar 1 disorder, depressed (HCC)    Knee pain        Current Medication      Current Outpatient Medications:     Elastic Bandages & Supports (LUMBAR BACK BRACE/SUPPORT PAD) MISC, 1 Units by Does not apply route daily PNEUMATIC OFF LOADNG BACK BRACE, Disp: 1 each, Rfl: 0    baclofen (LIORESAL) 10 MG tablet, Take 1 tablet by mouth nightly, Disp: 30 tablet, Rfl: 1    meloxicam (MOBIC) 15 MG tablet, Take 1 tablet by mouth daily, Disp: 30 tablet, Rfl: 1    FLUoxetine (PROZAC) 20 MG tablet, TAKE 1 TABLET BY MOUTH ONCE DAILY, Disp: , Rfl:     medroxyPROGESTERone (DEPO-PROVERA) 150 MG/ML injection, INJECT 1 ML INTRAMUSCULARY ONCE EVERY THREE MONTHS, Disp: 1 mL, Rfl: 3    Cariprazine HCl (VRAYLAR) 6 MG CAPS, Take by mouth daily, Disp: , Rfl:     Elastic Bandages & Supports (LUMBAR BACK BRACE/SUPPORT PAD) MISC, 1 Units by Does not apply route daily, Disp: 1 each, Rfl: 0    diclofenac (VOLTAREN) 75 MG EC tablet, TAKE 1 TABLET BY MOUTH EVERY 12 HOURS AS NEEDED FOR BODY ACHES, Disp: , Rfl: 4              HPI     Review of Systems   Constitutional: Negative. HENT: Negative. Respiratory: Negative. Cardiovascular: Negative. Gastrointestinal: Negative. Genitourinary: Negative. Musculoskeletal: Positive for myalgias. Fibromyalgia   Skin: Negative. Neurological: Positive for headaches.    Hematological:

## 2021-04-16 NOTE — PROGRESS NOTES
CLINICAL PHARMACY NOTE: MEDS TO [address] Select Patient?: Yes  Total # of Prescriptions Filled: 2   The following medications were delivered to the patient:  · Cephalexin 500mg  · Percocet 5-325  Total # of Interventions Completed: 0  Time Spent (min): 0    Additional Documentation:

## 2021-04-19 LAB — DERMATOLOGY PATHOLOGY REPORT: NORMAL

## 2021-05-12 DIAGNOSIS — L40.8 SEBOPSORIASIS: ICD-10-CM

## 2021-05-12 RX ORDER — KETOCONAZOLE 20 MG/ML
SHAMPOO TOPICAL
Qty: 120 ML | Refills: 2 | Status: SHIPPED | OUTPATIENT
Start: 2021-05-12 | End: 2022-10-27

## 2021-05-19 ENCOUNTER — OFFICE VISIT (OUTPATIENT)
Dept: PAIN MANAGEMENT | Age: 31
End: 2021-05-19
Payer: MEDICARE

## 2021-05-19 VITALS
HEART RATE: 92 BPM | BODY MASS INDEX: 45.03 KG/M2 | DIASTOLIC BLOOD PRESSURE: 63 MMHG | HEIGHT: 63 IN | OXYGEN SATURATION: 96 % | SYSTOLIC BLOOD PRESSURE: 91 MMHG

## 2021-05-19 DIAGNOSIS — M47.817 LUMBOSACRAL SPONDYLOSIS WITHOUT MYELOPATHY: Primary | ICD-10-CM

## 2021-05-19 PROCEDURE — G8427 DOCREV CUR MEDS BY ELIG CLIN: HCPCS | Performed by: ANESTHESIOLOGY

## 2021-05-19 PROCEDURE — 99212 OFFICE O/P EST SF 10 MIN: CPT | Performed by: ANESTHESIOLOGY

## 2021-05-19 PROCEDURE — 1036F TOBACCO NON-USER: CPT | Performed by: ANESTHESIOLOGY

## 2021-05-19 PROCEDURE — G8417 CALC BMI ABV UP PARAM F/U: HCPCS | Performed by: ANESTHESIOLOGY

## 2021-05-19 RX ORDER — LEVOMEFOLATE CALCIUM 100 %
15 POWDER (GRAM) MISCELLANEOUS DAILY
COMMUNITY
Start: 2021-04-22 | End: 2021-11-23 | Stop reason: ALTCHOICE

## 2021-05-19 RX ORDER — TOPIRAMATE 50 MG/1
TABLET, FILM COATED ORAL
COMMUNITY
Start: 2021-05-07 | End: 2022-03-29

## 2021-05-19 RX ORDER — BACLOFEN 10 MG/1
10 TABLET ORAL NIGHTLY
COMMUNITY
Start: 2021-03-18 | End: 2021-06-17

## 2021-05-19 RX ORDER — OXCARBAZEPINE 150 MG/1
TABLET, FILM COATED ORAL
COMMUNITY
Start: 2021-04-22 | End: 2022-06-09 | Stop reason: ALTCHOICE

## 2021-05-19 ASSESSMENT — ENCOUNTER SYMPTOMS
BACK PAIN: 1
RESPIRATORY NEGATIVE: 1

## 2021-05-19 NOTE — PROGRESS NOTES
The patient is a 31 y. o. Non-/non  female. Chief Complaint   Patient presents with    Back Pain        HPI    29-year-old woman with history of chronic back and leg pain  Leg pain resolved after epidural injection  For residual back pain with suspected facet mediated pain  Gated bilateral lumbar facet injection  Today here for follow-up  Report 100% improvement in back pain  No side effect from the injection  Pain is currently very well controlled  Reports significant improvement in activity level    Patient presents to the office to follow up on chronic back pain and leg pain. Patient rates pain to back today as 1/10. Pain is intermittent, pain is stabbing. Aggravating factors include standing and sitting.  Alleviating factors include: medication, ice, and heat    Past Medical History:   Diagnosis Date    ADHD     Anxiety     Back pain     Brain injury ([facility] [address]     Depression     Hearing loss     Learning disability     Migraine headache     Obese       Past Surgical History:   Procedure Laterality Date    OTHER SURGICAL HISTORY Bilateral 03/25/2021    lumbar DENIZ    PAIN MANAGEMENT PROCEDURE Bilateral 12/14/2020    EPIDURAL STEROID INJECTION BILATERAL L5 performed by Neto Sy MD at [address] [place] [address] Bilateral 1/4/2021    EPIDURAL STEROID INJECTION BILATERAL L5 performed by Neto Sy MD at [address] [place] [address] Bilateral 3/25/2021    LUMBAR FACET STEROID INJECTION BILATERAL L4 / 5 performed by Neto Sy MD at [facility]  04/15/2021    MID BACK CYST LESION BIOPSY EXCISION    SKIN BIOPSY N/A 4/15/2021    MID BACK CYST LESION BIOPSY EXCISION performed by Amish Cervantes DO at [facility] [address] History     Socioeconomic History    Marital status: Single     Spouse name: None    Number of children: None    Years of education: None    Highest education level: None Occupational History    None   Tobacco Use    Smoking status: Former Smoker     Types: Cigarettes     Quit date: 2016     Years since quittin.3    Smokeless tobacco: Never Used   Vaping Use    Vaping Use: Former   Substance and Sexual Activity    Alcohol use: Never     Alcohol/week: 0.0 standard drinks    Drug use: No    Sexual activity: Yes   Other Topics Concern    None   Social History Narrative    None     Social Determinants of Health     Financial Resource Strain: Low Risk     Difficulty of Paying Living Expenses: Not hard at all   Food Insecurity: No Food Insecurity    Worried About Running Out of Food in the Last Year: Never true    Maya of Food in the Last Year: Never true   Transportation Needs: No Transportation Needs    Lack of Transportation (Medical): No    Lack of Transportation (Non-Medical):  No   Physical Activity:     Days of Exercise per Week:     Minutes of Exercise per Session:    Stress:     Feeling of Stress :    Social Connections:     Frequency of Communication with Friends and Family:     Frequency of Social Gatherings with Friends and Family:     Attends Zoroastrian Services:     Active Member of Clubs or Organizations:     Attends Club or Organization Meetings:     Marital Status:    Intimate Partner Violence:     Fear of Current or Ex-Partner:     Emotionally Abused:     Physically Abused:     Sexually Abused:      Family History   Problem Relation Age of Onset    Diabetes Mother     Heart Disease Father     Diabetes Father     Diabetes Maternal Grandmother     Diabetes Maternal Grandfather     Diabetes Paternal Grandmother     Diabetes Paternal Grandfather     Other Sister         sepsis in blood stream and pneumonia    Alcohol Abuse Brother     Drug Abuse Brother     Alcohol Abuse Brother     Drug Abuse Brother     Breast Cancer Neg Hx     Colon Cancer Neg Hx     Ovarian Cancer Neg Hx      Allergies   Allergen Reactions    Cat Hair HEENT: (No visible masses in neck  Range of motion appears normal on cervical spine  External ears appears normal)    Lungs:  Normal effort. She is not in respiratory distress. Neurological: Patient is alert and oriented to person, place and time.  (Able to follow command    Psych  Mood is good  Affect is normal). Skin:  No rash or cyanosis. Assessment & Plan   79-year-old woman with history of chronic back and leg pain  Leg pain resolved after epidural injection  For residual back pain with suspected facet mediated pain  Gated bilateral lumbar facet injection  Today here for follow-up  Report 100% improvement in back pain  No side effect from the injection  Pain is currently very well controlled  Reports significant improvement in activity level  1.  Lumbosacral spondylosis without myelopathy          Intervention indicated at this time  Consider for repeat interventional procedure once pain returns  Electronically signed by Laura Peralta MD on 5/19/2021 at 1:38 PM

## 2021-06-07 ENCOUNTER — NURSE ONLY (OUTPATIENT)
Dept: OBGYN CLINIC | Age: 31
End: 2021-06-07
Payer: MEDICARE

## 2021-06-07 VITALS
BODY MASS INDEX: 43.89 KG/M2 | SYSTOLIC BLOOD PRESSURE: 121 MMHG | HEIGHT: 63 IN | DIASTOLIC BLOOD PRESSURE: 74 MMHG | WEIGHT: 247.7 LBS | HEART RATE: 86 BPM

## 2021-06-07 DIAGNOSIS — Z30.42 ENCOUNTER FOR SURVEILLANCE OF INJECTABLE CONTRACEPTIVE: Primary | ICD-10-CM

## 2021-06-07 DIAGNOSIS — N94.6 DYSMENORRHEA: ICD-10-CM

## 2021-06-07 PROCEDURE — 96372 THER/PROPH/DIAG INJ SC/IM: CPT | Performed by: ADVANCED PRACTICE MIDWIFE

## 2021-06-07 RX ORDER — MEDROXYPROGESTERONE ACETATE 150 MG/ML
150 INJECTION, SUSPENSION INTRAMUSCULAR ONCE
Status: COMPLETED | OUTPATIENT
Start: 2021-06-07 | End: 2021-06-07

## 2021-06-07 RX ADMIN — MEDROXYPROGESTERONE ACETATE 150 MG: 150 INJECTION, SUSPENSION INTRAMUSCULAR at 13:36

## 2021-08-09 DIAGNOSIS — M54.42 CHRONIC BILATERAL LOW BACK PAIN WITH BILATERAL SCIATICA: ICD-10-CM

## 2021-08-09 DIAGNOSIS — M54.41 CHRONIC BILATERAL LOW BACK PAIN WITH BILATERAL SCIATICA: ICD-10-CM

## 2021-08-09 DIAGNOSIS — G89.29 CHRONIC BILATERAL LOW BACK PAIN WITH BILATERAL SCIATICA: ICD-10-CM

## 2021-08-09 DIAGNOSIS — M51.26 LUMBAR DISC HERNIATION: ICD-10-CM

## 2021-08-09 RX ORDER — MELOXICAM 15 MG/1
TABLET ORAL
Qty: 30 TABLET | Refills: 0 | Status: SHIPPED | OUTPATIENT
Start: 2021-08-09 | End: 2021-09-21 | Stop reason: SDUPTHER

## 2021-08-09 NOTE — TELEPHONE ENCOUNTER
Oneal Apley is requesting a refill on the following medications:   Requested Prescriptions     Pending Prescriptions Disp Refills    meloxicam (MOBIC) 15 MG tablet [Pharmacy Med Name: Meloxicam 15 MG Oral Tablet] 30 tablet 0     Sig: Take 1 tablet by mouth once daily       Last OV 5/19/21    Future Appointments   Date Time Provider Sajan Pardo   8/30/2021  1:00 PM SCHEDULE, [facility] OB[facility]GYN [facility] OB [facility]       OARRS report sent to Dr. Erika Jacobson through alternative route for review.

## 2021-08-16 RX ORDER — BACLOFEN 10 MG/1
TABLET ORAL
Qty: 30 TABLET | Refills: 1 | Status: SHIPPED | OUTPATIENT
Start: 2021-08-16 | End: 2021-10-19 | Stop reason: SDUPTHER

## 2021-08-16 NOTE — TELEPHONE ENCOUNTER
Jaxon Slaughter is requesting a refill on the following medications:   Requested Prescriptions     Pending Prescriptions Disp Refills    baclofen (LIORESAL) 10 MG tablet 30 tablet 1       Last OV 5/19/21    Future Appointments   Date Time Provider Sajan Pardo   8/30/2021  1:00 PM SCHEDULE, [facility] OB[facility]GYN [facility] OB [facility]       OARRS report sent to Dr. Ronan Girard through alternative route for review.

## 2021-08-30 ENCOUNTER — NURSE ONLY (OUTPATIENT)
Dept: OBGYN CLINIC | Age: 31
End: 2021-08-30
Payer: MEDICARE

## 2021-08-30 VITALS
BODY MASS INDEX: 44.14 KG/M2 | SYSTOLIC BLOOD PRESSURE: 107 MMHG | WEIGHT: 249.1 LBS | DIASTOLIC BLOOD PRESSURE: 81 MMHG | HEIGHT: 63 IN | HEART RATE: 93 BPM

## 2021-08-30 DIAGNOSIS — N94.6 DYSMENORRHEA: Primary | ICD-10-CM

## 2021-08-30 PROCEDURE — 96372 THER/PROPH/DIAG INJ SC/IM: CPT | Performed by: ADVANCED PRACTICE MIDWIFE

## 2021-08-30 RX ORDER — MEDROXYPROGESTERONE ACETATE 150 MG/ML
150 INJECTION, SUSPENSION INTRAMUSCULAR ONCE
Status: COMPLETED | OUTPATIENT
Start: 2021-08-30 | End: 2021-08-30

## 2021-08-30 RX ADMIN — MEDROXYPROGESTERONE ACETATE 150 MG: 150 INJECTION, SUSPENSION INTRAMUSCULAR at 13:11

## 2021-08-30 NOTE — PROGRESS NOTES
After obtaining consent, and [facility] injection of medroxyprogesterone given in Right upper quad. gluteus by Son Wellington. Patient instructed to remain in clinic for 20 minutes afterwards, and to report any adverse reaction to me immediately.

## 2021-09-21 ENCOUNTER — OFFICE VISIT (OUTPATIENT)
Dept: PAIN MANAGEMENT | Age: 31
End: 2021-09-21
Payer: MEDICARE

## 2021-09-21 VITALS — WEIGHT: 248.2 LBS | BODY MASS INDEX: 43.98 KG/M2 | HEIGHT: 63 IN

## 2021-09-21 DIAGNOSIS — M47.817 LUMBOSACRAL SPONDYLOSIS WITHOUT MYELOPATHY: ICD-10-CM

## 2021-09-21 DIAGNOSIS — M47.816 LUMBAR FACET ARTHROPATHY: Primary | ICD-10-CM

## 2021-09-21 DIAGNOSIS — G89.29 CHRONIC BILATERAL LOW BACK PAIN WITHOUT SCIATICA: ICD-10-CM

## 2021-09-21 DIAGNOSIS — M54.50 CHRONIC BILATERAL LOW BACK PAIN WITHOUT SCIATICA: ICD-10-CM

## 2021-09-21 PROCEDURE — 1036F TOBACCO NON-USER: CPT | Performed by: ANESTHESIOLOGY

## 2021-09-21 PROCEDURE — G8427 DOCREV CUR MEDS BY ELIG CLIN: HCPCS | Performed by: ANESTHESIOLOGY

## 2021-09-21 PROCEDURE — 99214 OFFICE O/P EST MOD 30 MIN: CPT | Performed by: ANESTHESIOLOGY

## 2021-09-21 PROCEDURE — G8417 CALC BMI ABV UP PARAM F/U: HCPCS | Performed by: ANESTHESIOLOGY

## 2021-09-21 RX ORDER — MELOXICAM 15 MG/1
TABLET ORAL
Qty: 30 TABLET | Refills: 1 | Status: SHIPPED | OUTPATIENT
Start: 2021-09-21 | End: 2021-11-04

## 2021-09-21 ASSESSMENT — ENCOUNTER SYMPTOMS
RESPIRATORY NEGATIVE: 1
EYES NEGATIVE: 1

## 2021-09-21 NOTE — PROGRESS NOTES
The patient is a 36 y. o. Non- / non  female. Chief Complaint   Patient presents with    Follow-up    Back Pain        HPI  Back pain  Pain is chronic onset more than 1 year ago located in the lower lumbar area across midline affect both side  Aggravated with spine extension  No radiation in leg  No associated numbness or paresthesia  No changes in bladder or bowel control  Describes as aching nagging stiffness  Flared up after recent excessive activity    Previous imaging had shown facet arthropathy  Did physical therapy without any improvement  Had bilateral lumbar facet steroid injection in March  She reported 100% improvement lasting for almost 6 months  Pain is now back to the baseline    Currently taking meloxicam  Needs refill  Denies any side effects  Medication Refill: Meloxciam    Pain score Today:  5  Adverse effects (Constipation / Nausea / Sedation / sexual Dysfunction / others) :  none  Mood: fair  Sleep pattern and quality: poor  Activity level: poor    Pill count Today:   Last dose taken    OARRS report reviewed today: yes  ER/Hospitalizations/PCP visit related to pain since last visit:no   Any legal problems e.g. DUI etc.:No  Satisfied with current management:        Lab Results   Component Value Date    LABA1C 6.1 (H) 09/28/2020     Lab Results   Component Value Date     09/28/2020       Past Medical History, Past Surgical History, Social History, Allergies and Medications reviewed and updated in EPIC as indicated    Family History reviewed and is noncontributory.         Past Medical History:   Diagnosis Date    ADHD     Anxiety     Back pain     Brain injury ([address]     Depression     Hearing loss     Learning disability     Migraine headache     Obese         Past Surgical History:   Procedure Laterality Date    OTHER SURGICAL HISTORY Bilateral 03/25/2021    lumbar DENIZ    PAIN MANAGEMENT PROCEDURE Bilateral 12/14/2020    EPIDURAL STEROID INJECTION BILATERAL L5 performed by Kavita Swain MD at [address] Bilateral 2021    EPIDURAL STEROID INJECTION BILATERAL L5 performed by Kavita Swain MD at [address] Bilateral 3/25/2021    LUMBAR FACET STEROID INJECTION BILATERAL L4 / 5 performed by Kavita Swain MD at [address] [facility]  04/15/2021    MID BACK CYST LESION BIOPSY EXCISION    SKIN BIOPSY N/A 4/15/2021    MID BACK CYST LESION BIOPSY EXCISION performed by Jeremiah Guevara DO at [address] [place] [address] EXTRACTION         Social History     Socioeconomic History    Marital status: Single     Spouse name: None    Number of children: None    Years of education: None    Highest education level: None   Occupational History    None   Tobacco Use    Smoking status: Former Smoker     Types: Cigarettes     Quit date: 2016     Years since quittin.7    Smokeless tobacco: Never Used   Vaping Use    Vaping Use: Former   Substance and Sexual Activity    Alcohol use: Never     Alcohol/week: 0.0 standard drinks    Drug use: No    Sexual activity: Yes   Other Topics Concern    None   Social History Narrative    None     Social Determinants of Health     Financial Resource Strain: Low Risk     Difficulty of Paying Living Expenses: Not hard at all   Food Insecurity: No Food Insecurity    Worried About [address] [facility] in the Last Year: Never true    Maya of Food in the Last Year: Never true   Transportation Needs: No Transportation Needs    Lack of Transportation (Medical): No    Lack of Transportation (Non-Medical):  No   Physical Activity:     Days of Exercise per Week:     Minutes of Exercise per Session:    Stress:     Feeling of Stress :    Social Connections:     Frequency of Communication with Friends and Family:     Frequency of Social Gatherings with Friends and Family:     Attends Latter-day Services:     Active Member of Clubs or Organizations:  Attends Club or Organization Meetings:     Marital Status:    Intimate Partner Violence:     Fear of Current or Ex-Partner:     Emotionally Abused:     Physically Abused:     Sexually Abused:        Family History   Problem Relation Age of Onset    Diabetes Mother     Heart Disease Father     Diabetes Father     Diabetes Maternal Grandmother     Diabetes Maternal Grandfather     Diabetes Paternal Grandmother     Diabetes Paternal Grandfather     Other Sister         sepsis in blood stream and pneumonia    Alcohol Abuse Brother     Drug Abuse Brother     Alcohol Abuse Brother     Drug Abuse Brother     Breast Cancer Neg Hx     Colon Cancer Neg Hx     Ovarian Cancer Neg Hx        Allergies   Allergen Reactions    Cat Hair Extract     Dust Mite Extract     Molds & Smuts     Seasonal        There were no vitals filed for this visit. Current Outpatient Medications   Medication Sig Dispense Refill    meloxicam (MOBIC) 15 MG tablet Take 1 tablet by mouth once daily 30 tablet 1    lurasidone (LATUDA) 60 MG TABS tablet Take 60 mg by mouth daily      Vilazodone HCl 10 & 20 MG KIT Take one of10 mg tablet daily for 7 days.  Day 8 take one tablet of 20 mg daily      baclofen (LIORESAL) 10 MG tablet Take 1 tablet by mouth nightly 30 tablet 1    topiramate (TOPAMAX) 50 MG tablet TAKE 1 TABLET BY MOUTH EVERY DAY AT BEDTIME      ketoconazole (NIZORAL) 2 % shampoo Apply 3-4 times weekly to scalp, leave on for five minutes prior to washing off 120 mL 2    medroxyPROGESTERone (DEPO-PROVERA) 150 MG/ML injection INJECT 1 ML INTRAMUSCULARY ONCE EVERY THREE MONTHS 1 mL 3    Levomefolate Calcium POWD Take 15 mg by mouth daily (Patient not taking: Reported on 9/21/2021)      OXcarbazepine (TRILEPTAL) 150 MG tablet TAKE 1 TABLET BY MOUTH IN THE MORNING FOR 2 WEEKS AND THEN 1 TAB TWICE DAILY (Patient not taking: Reported on 9/21/2021)      Elastic Bandages & Supports (LUMBAR BACK BRACE/SUPPORT PAD) [facility] 1 Units by Does not apply route daily PNEUMATIC OFF LOADNG BACK BRACE (Patient not taking: Reported on 9/21/2021) 1 each 0    FLUoxetine (PROZAC) 20 MG tablet 20 mg 2 times daily  (Patient not taking: Reported on 9/21/2021)      cariprazine hcl (VRAYLAR) 3 MG CAPS capsule Take by mouth daily  (Patient not taking: Reported on 9/21/2021)      Elastic Bandages & Supports (LUMBAR BACK BRACE/SUPPORT PAD) MISC 1 Units by Does not apply route daily (Patient not taking: Reported on 9/21/2021) 1 each 0    diclofenac (VOLTAREN) 75 MG EC tablet TAKE 1 TABLET BY MOUTH EVERY 12 HOURS AS NEEDED FOR BODY ACHES (Patient not taking: Reported on 5/19/2021)  4     No current facility-administered medications for this visit. Review of Systems   Constitutional: Negative. Negative for fever. HENT: Negative. Eyes: Negative. Respiratory: Negative. Cardiovascular: Negative. Objective:  General Appearance:  Well-appearing and in no acute distress. Vital signs: (most recent): Height 5' 3\" (1.6 m), weight 248 lb 3.2 oz (112.6 kg), not currently breastfeeding. Vital signs are normal.  No fever. Output: Producing urine and producing stool. HEENT: Normal HEENT exam.    Lungs:  Normal effort and normal respiratory rate. Breath sounds clear to auscultation. She is not in respiratory distress. Heart: Normal rate. Regular rhythm. Extremities: Normal range of motion. There is no deformity. Neurological: Patient is alert and oriented to person, place and time. Normal strength. Patient has normal reflexes, normal muscle tone and normal coordination. Pupils:  Pupils are equal, round, and reactive to light. Pupils are equal.   Skin:  Warm and dry. No rash or cyanosis.    Lumbar spine examination  No apparent deformity on inspection range of motion is limited and associated with pain  Positive tenderness palpation over lower lumbosacral area across midline affect both side  Facet loading maneuver positive  Gait stable    Assessment & Plan   Axial lower back pain onset more than 1 year ago located in the lumbar area across midline no radiation in leg  Imaging showed facet arthropathy  Failed physical therapy  Currently taking NSAIDs  Clinical exam suggested facet mediated pain  Had lumbar facet steroid injection that provided 90% improvement with improved activity tolerance lasting for about 6-month  Pain is now back to the baseline    Will recommend for diagnostic lumbar medial branch nerve block  If that provide short-term relief then consider for lumbar medial branch nerve radiofrequency ablation    1. Lumbar facet arthropathy    2. Lumbosacral spondylosis without myelopathy    3.  Chronic bilateral low back pain without sciatica        Orders Placed This Encounter   Procedures    FACET JOINT L/S SINGLE      Orders Placed This Encounter   Medications    meloxicam (MOBIC) 15 MG tablet     Sig: Take 1 tablet by mouth once daily     Dispense:  30 tablet     Refill:  1            Electronically signed by Nava Durán MD on 9/21/2021 at 3:58 PM

## 2021-10-04 ENCOUNTER — TELEPHONE (OUTPATIENT)
Dept: PAIN MANAGEMENT | Age: 31
End: 2021-10-04

## 2021-10-04 ENCOUNTER — HOSPITAL ENCOUNTER (OUTPATIENT)
Dept: PAIN MANAGEMENT | Facility: CLINIC | Age: 31
Discharge: HOME | End: 2021-10-04
Payer: MEDICARE

## 2021-10-04 VITALS
WEIGHT: 248 LBS | BODY MASS INDEX: 43.94 KG/M2 | TEMPERATURE: 97.4 F | SYSTOLIC BLOOD PRESSURE: 125 MMHG | OXYGEN SATURATION: 98 % | HEIGHT: 63 IN | DIASTOLIC BLOOD PRESSURE: 65 MMHG | HEART RATE: 89 BPM | RESPIRATION RATE: 12 BRPM

## 2021-10-04 DIAGNOSIS — R52 PAIN MANAGEMENT: ICD-10-CM

## 2021-10-04 LAB — HCG, PREGNANCY URINE (POC): NEGATIVE

## 2021-10-04 PROCEDURE — 81025 URINE PREGNANCY TEST: CPT

## 2021-10-04 PROCEDURE — 6360000004 HC RX CONTRAST MEDICATION: Performed by: ANESTHESIOLOGY

## 2021-10-04 PROCEDURE — 99152 MOD SED SAME PHYS/QHP 5/>YRS: CPT | Performed by: ANESTHESIOLOGY

## 2021-10-04 PROCEDURE — 2580000003 HC RX 258: Performed by: ANESTHESIOLOGY

## 2021-10-04 PROCEDURE — 64493 INJ PARAVERT F JNT L/S 1 LEV: CPT

## 2021-10-04 PROCEDURE — 64495 INJ PARAVERT F JNT L/S 3 LEV: CPT

## 2021-10-04 PROCEDURE — 6360000002 HC RX W HCPCS: Performed by: ANESTHESIOLOGY

## 2021-10-04 PROCEDURE — 64493 INJ PARAVERT F JNT L/S 1 LEV: CPT | Performed by: ANESTHESIOLOGY

## 2021-10-04 PROCEDURE — 2500000003 HC RX 250 WO HCPCS: Performed by: ANESTHESIOLOGY

## 2021-10-04 PROCEDURE — 64494 INJ PARAVERT F JNT L/S 2 LEV: CPT

## 2021-10-04 PROCEDURE — 64494 INJ PARAVERT F JNT L/S 2 LEV: CPT | Performed by: ANESTHESIOLOGY

## 2021-10-04 RX ORDER — BUPIVACAINE HYDROCHLORIDE 5 MG/ML
INJECTION, SOLUTION EPIDURAL; INTRACAUDAL
Status: COMPLETED | OUTPATIENT
Start: 2021-10-04 | End: 2021-10-04

## 2021-10-04 RX ORDER — SODIUM CHLORIDE 0.9 % (FLUSH) 0.9 %
SYRINGE (ML) INJECTION
Status: COMPLETED | OUTPATIENT
Start: 2021-10-04 | End: 2021-10-04

## 2021-10-04 RX ORDER — MIDAZOLAM HYDROCHLORIDE 2 MG/2ML
INJECTION, SOLUTION INTRAMUSCULAR; INTRAVENOUS
Status: COMPLETED | OUTPATIENT
Start: 2021-10-04 | End: 2021-10-04

## 2021-10-04 RX ORDER — LIDOCAINE HYDROCHLORIDE 10 MG/ML
INJECTION, SOLUTION EPIDURAL; INFILTRATION; INTRACAUDAL; PERINEURAL
Status: COMPLETED | OUTPATIENT
Start: 2021-10-04 | End: 2021-10-04

## 2021-10-04 RX ORDER — FENTANYL CITRATE 50 UG/ML
INJECTION, SOLUTION INTRAMUSCULAR; INTRAVENOUS
Status: COMPLETED | OUTPATIENT
Start: 2021-10-04 | End: 2021-10-04

## 2021-10-04 RX ADMIN — FENTANYL CITRATE 50 MCG: 50 INJECTION INTRAMUSCULAR; INTRAVENOUS at 07:45

## 2021-10-04 RX ADMIN — MIDAZOLAM HYDROCHLORIDE 2 MG: 1 INJECTION, SOLUTION INTRAMUSCULAR; INTRAVENOUS at 07:46

## 2021-10-04 RX ADMIN — Medication 3 ML: at 07:46

## 2021-10-04 RX ADMIN — BUPIVACAINE HYDROCHLORIDE 6 ML: 5 INJECTION, SOLUTION EPIDURAL; INTRACAUDAL; PERINEURAL at 07:52

## 2021-10-04 RX ADMIN — LIDOCAINE HYDROCHLORIDE 5 ML: 10 INJECTION, SOLUTION EPIDURAL; INFILTRATION; INTRACAUDAL at 07:46

## 2021-10-04 RX ADMIN — IOHEXOL 3 ML: 180 INJECTION INTRAVENOUS at 07:51

## 2021-10-04 ASSESSMENT — PAIN DESCRIPTION - ONSET: ONSET: PROGRESSIVE

## 2021-10-04 ASSESSMENT — PAIN DESCRIPTION - LOCATION: LOCATION: BACK

## 2021-10-04 ASSESSMENT — PAIN DESCRIPTION - PROGRESSION: CLINICAL_PROGRESSION: NOT CHANGED

## 2021-10-04 ASSESSMENT — PAIN DESCRIPTION - DESCRIPTORS: DESCRIPTORS: SHARP

## 2021-10-04 ASSESSMENT — PAIN DESCRIPTION - PAIN TYPE: TYPE: CHRONIC PAIN

## 2021-10-04 ASSESSMENT — PAIN SCALES - GENERAL: PAINLEVEL_OUTOF10: 5

## 2021-10-04 ASSESSMENT — PAIN - FUNCTIONAL ASSESSMENT
PAIN_FUNCTIONAL_ASSESSMENT: 0-10
PAIN_FUNCTIONAL_ASSESSMENT: PREVENTS OR INTERFERES SOME ACTIVE ACTIVITIES AND ADLS

## 2021-10-04 ASSESSMENT — PAIN DESCRIPTION - FREQUENCY: FREQUENCY: INTERMITTENT

## 2021-10-04 ASSESSMENT — PAIN DESCRIPTION - ORIENTATION: ORIENTATION: LOWER

## 2021-10-04 NOTE — TELEPHONE ENCOUNTER
Received an email from Jayy Kapadia in [id number] [facility] stating that patient procedure DOS 10/4/21 has been approved

## 2021-10-04 NOTE — OP NOTE
Patient Name: Zully Carreno   YOB: 1990  Room/Bed: Room/bed info not found  Medical Record Number: [id number]  Date: 10/4/2021       Sedation/ Anesthesia Plan:   intravenous sedation   as needed. Medications Planned:   midazolam (Versed) / Fentanyl  Intravenously  as needed. Preoperative Diagnosis: Lumbar spondylosis w/o myelopathy or radiculopathy  Postoperative Diagnosis: Lumbar spondylosis w/o myelopathy or radiculopathy  Blood Loss: none    Procedure Performed:  Bilateral Lumbar Medial Branch nerve Blocks at the transverse processes of L3, L4, L5 and sacral  ala under fluoroscopy guidance    Procedure: The Patient was seen in the preop area, chart was reviewed, informed consent was obtained. Patient was taken to procedure room and was placed in prone position. Vital signs were monitored through out the  Procedure. A time out was completed. The skin over the back was prepped and draped in sterile manner. The target point was marked at the junction of Transverse process and superior articular process at the target levels. Skin and deep tissues were anesthetized with 1 % lidocaine. A 25-gauge needlele was advanced to the target spots under fluoroscopy guidance in AP / Lateral and Oblique views. Then after negative aspiration contrast dye was injected with live fluoroscopy in AP views that showed  spread of the contrast with no epidural space and no vascular runoff or intrathecal spread. Finally 0.5 ml of treatment solution 0.5 % BUPIVACAINE  was injected at each level. The needle was removed and a Band-Aid was placed over the needle  insertion site. The patient's vital signs remained stable and the patient tolerated the procedure well.       Post Procedure pain score in PACU was assessed with ambulation 1/10    Electronically signed by Lashaun Vaughn MD on 10/4/2021 at 8:09 AM    SEDATION NOTE:    ASA CLASSIFICATION  2  MP   CLASSIFICATION  2    Moderate intravenous conscious sedation was supervised by Dr. Roxana Garcia  The patient was independently monitored by a Registered Nurse assigned to the Procedure Room  Monitoring included automated blood pressure, continuous EKG, Capnography and continuous pulse oximetry. The detailed Conscious Record is permanently [facility] [place] [address].      The following is the conscious sedation record;  Start Time:  0744  End times:  42246  Duration:  12 minutes  MEDS GIVEN 2 MG VERSED AND 50 MCG FENTANYL

## 2021-10-04 NOTE — H&P
UPDATE:  Office visit pain clinic in [facility] with all required elements of H&P dated 09/21/2021  Patient seen preop, chart reviewed, AAO x 3, in NAD, VSS,. No changes in medical history since last evaluation. Risk / Benefits explained to patient, patient agree to proceed with plan.   ASA 2  MP 2

## 2021-10-19 ENCOUNTER — OFFICE VISIT (OUTPATIENT)
Dept: PAIN MANAGEMENT | Age: 31
End: 2021-10-19
Payer: MEDICARE

## 2021-10-19 VITALS
HEIGHT: 63 IN | HEART RATE: 87 BPM | SYSTOLIC BLOOD PRESSURE: 127 MMHG | DIASTOLIC BLOOD PRESSURE: 87 MMHG | BODY MASS INDEX: 45 KG/M2 | WEIGHT: 254 LBS

## 2021-10-19 DIAGNOSIS — M51.26 LUMBAR DISC HERNIATION: Primary | Chronic | ICD-10-CM

## 2021-10-19 DIAGNOSIS — M47.816 LUMBAR FACET JOINT SYNDROME: ICD-10-CM

## 2021-10-19 PROCEDURE — G8417 CALC BMI ABV UP PARAM F/U: HCPCS | Performed by: ANESTHESIOLOGY

## 2021-10-19 PROCEDURE — 1036F TOBACCO NON-USER: CPT | Performed by: ANESTHESIOLOGY

## 2021-10-19 PROCEDURE — G8427 DOCREV CUR MEDS BY ELIG CLIN: HCPCS | Performed by: ANESTHESIOLOGY

## 2021-10-19 PROCEDURE — 99214 OFFICE O/P EST MOD 30 MIN: CPT | Performed by: ANESTHESIOLOGY

## 2021-10-19 PROCEDURE — G8484 FLU IMMUNIZE NO ADMIN: HCPCS | Performed by: ANESTHESIOLOGY

## 2021-10-19 RX ORDER — BACLOFEN 10 MG/1
10 TABLET ORAL 2 TIMES DAILY
Qty: 60 TABLET | Refills: 1 | Status: SHIPPED | OUTPATIENT
Start: 2021-10-19 | End: 2022-01-03 | Stop reason: SDUPTHER

## 2021-10-19 ASSESSMENT — ENCOUNTER SYMPTOMS
BACK PAIN: 1
RESPIRATORY NEGATIVE: 1

## 2021-10-19 NOTE — PROGRESS NOTES
The patient is a 36 y. o. Non- / non  female. Chief Complaint   Patient presents with    Back Pain    Follow Up After Procedure        HPI   Back pain  Majority of the pain is located in the lower lumbar area  Onset many years ago progressively worsened  No significant radiation in dermatomal fashion  Describes the pain is constant aching throbbing stiffness in the lower back area  Pain aggravated with routine activity  Failed conservative measures with therapy  Imaging showed facet arthropathy  Clinical exam suggested facet mediated pain  Patient recently had a second diagnostic lumbar medial branch nerve block  Today here for follow-up  Reports 100% improvement only for a few hours after the diagnostic block with improved activity and range of motion that faded away by the end of the day and now she is back to the baseline    S/P:Bilateral Lumbar Medial Branch nerve Blocks at the transverse processes of L3, L4, L5 and sacral  ala under fluoroscopy guidance # 2      Outcome   Any improvement of activity?   No   Any side effects (appetite,leg cramping,facial fleshing):none   Increase of pain:  Yes  Pain score Today:  6      Lab Results   Component Value Date    LABA1C 6.1 (H) 09/28/2020     Lab Results   Component Value Date     09/28/2020             Past Medical History:   Diagnosis Date    ADHD     Anxiety     Back pain     [address] injury [address] [address]     Depression     Hearing loss     Learning disability     Migraine headache     Obese         Past Surgical History:   Procedure Laterality Date    OTHER SURGICAL HISTORY Bilateral 03/25/2021    lumbar DENIZ    PAIN MANAGEMENT PROCEDURE Bilateral 12/14/2020    EPIDURAL STEROID INJECTION BILATERAL L5 performed by Darvin Haskins MD at [address] [place] [address] Bilateral 1/4/2021    EPIDURAL STEROID INJECTION BILATERAL L5 performed by Darvin Haskins MD at [address] [place] [address] Bilateral 3/25/2021    LUMBAR FACET STEROID INJECTION BILATERAL L4 / 5 performed by Leonardo Avila MD at [facility]  04/15/2021    MID BACK CYST LESION BIOPSY EXCISION    SKIN BIOPSY N/A 4/15/2021    MID BACK CYST LESION BIOPSY EXCISION performed by Ned Puckett DO at [address] [place] [address]         Social History     Socioeconomic History    Marital status: Single     Spouse name: None    Number of children: None    Years of education: None    Highest education level: None   Occupational History    None   Tobacco Use    Smoking status: Former Smoker     Types: Cigarettes     Quit date: 2016     Years since quittin.8    Smokeless tobacco: Never Used   Vaping Use    Vaping Use: Former   Substance and Sexual Activity    Alcohol use: Never     Alcohol/week: 0.0 standard drinks    Drug use: No    Sexual activity: Yes   Other Topics Concern    None   Social History Narrative    None     Social Determinants of Health     Financial Resource Strain:     Difficulty of Paying Living Expenses:    Food Insecurity:     Worried About Running Out of Food in the Last Year:     Ran Out of Food in the Last Year:    Transportation Needs: No Transportation Needs    Lack of Transportation (Medical): No    Lack of Transportation (Non-Medical):  No   Physical Activity:     Days of Exercise per Week:     Minutes of Exercise per Session:    Stress:     Feeling of Stress :    Social Connections:     Frequency of Communication with Friends and Family:     Frequency of Social Gatherings with Friends and Family:     Attends Adventist Services:     Active Member of Clubs or Organizations:     Attends Club or Organization Meetings:     Marital Status:    Intimate Partner Violence:     Fear of Current or Ex-Partner:     Emotionally Abused:     Physically Abused:     Sexually Abused:        Family History   Problem Relation Age of Onset    Diabetes Mother     Heart Disease Father  Diabetes Father     Diabetes Maternal Grandmother     Diabetes Maternal Grandfather     Diabetes Paternal Grandmother     Diabetes Paternal Grandfather     Other Sister         sepsis in blood stream and pneumonia    Alcohol Abuse Brother     Drug Abuse Brother     Alcohol Abuse Brother     Drug Abuse Brother     Breast Cancer Neg Hx     Colon Cancer Neg Hx     Ovarian Cancer Neg Hx        Allergies   Allergen Reactions    Cat Hair Extract     Dust Mite Extract     Molds & Smuts     Seasonal        Vitals:    10/19/21 1603   BP: 127/87   Pulse: 87       Current Outpatient Medications   Medication Sig Dispense Refill    baclofen (LIORESAL) 10 MG tablet Take 1 tablet by mouth 2 times daily Take 1 tablet by mouth nightly 60 tablet 1    meloxicam (MOBIC) 15 MG tablet Take 1 tablet by mouth once daily 30 tablet 1    lurasidone (LATUDA) 60 MG TABS tablet Take 60 mg by mouth daily      Vilazodone HCl 10 & 20 MG KIT Take one of10 mg tablet daily for 7 days.  Day 8 take one tablet of 20 mg daily      ketoconazole (NIZORAL) 2 % shampoo Apply 3-4 times weekly to scalp, leave on for five minutes prior to washing off 120 mL 2    medroxyPROGESTERone (DEPO-PROVERA) 150 MG/ML injection INJECT 1 ML INTRAMUSCULARY ONCE EVERY THREE MONTHS 1 mL 3    diclofenac (VOLTAREN) 75 MG EC tablet TAKE 1 TABLET BY MOUTH EVERY 12 HOURS AS NEEDED FOR BODY ACHES  4    Levomefolate Calcium POWD Take 15 mg by mouth daily (Patient not taking: Reported on 9/21/2021)      OXcarbazepine (TRILEPTAL) 150 MG tablet TAKE 1 TABLET BY MOUTH IN THE MORNING FOR 2 WEEKS AND THEN 1 TAB TWICE DAILY (Patient not taking: Reported on 9/21/2021)      topiramate (TOPAMAX) 50 MG tablet TAKE 1 TABLET BY MOUTH EVERY DAY AT BEDTIME (Patient not taking: Reported on 10/19/2021)      Elastic Bandages & Supports (LUMBAR BACK BRACE/SUPPORT PAD) MISC 1 Units by Does not apply route daily PNEUMATIC OFF LOADNG BACK BRACE (Patient not taking: Reported on 9/21/2021) 1 each 0    FLUoxetine (PROZAC) 20 MG tablet 20 mg 2 times daily  (Patient not taking: Reported on 9/21/2021)      cariprazine hcl (VRAYLAR) 3 MG CAPS capsule Take by mouth daily  (Patient not taking: Reported on 9/21/2021)      Elastic Bandages & Supports (LUMBAR BACK BRACE/SUPPORT PAD) MISC 1 Units by Does not apply route daily (Patient not taking: Reported on 9/21/2021) 1 each 0     No current facility-administered medications for this visit. Review of Systems   Constitutional: Negative. Negative for fever. Respiratory: Negative. Cardiovascular: Negative. Musculoskeletal: Positive for back pain. Objective:  General Appearance:  Uncomfortable and in pain. Vital signs: (most recent): Blood pressure 127/87, pulse 87, height 5' 3\" (1.6 m), weight 254 lb (115.2 kg), not currently breastfeeding. Vital signs are normal.  No fever. Output: Producing urine and producing stool. HEENT: Normal HEENT exam.    Lungs:  Normal effort and normal respiratory rate. Breath sounds clear to auscultation. She is not in respiratory distress. Heart: Normal rate. Extremities: Normal range of motion. There is no deformity. Neurological: Patient is alert and oriented to person, place and time. Patient has normal coordination. Pupils:  Pupils are equal, round, and reactive to light. Pupils are equal.   Skin:  Warm and dry. No rash or cyanosis.      Assessment & Plan     Back pain  Majority of the pain is located in the lower lumbar area  Onset many years ago progressively worsened  No significant radiation in dermatomal fashion  Describes the pain is constant aching throbbing stiffness in the lower back area  Pain aggravated with routine activity  Failed conservative measures with therapy  Imaging showed facet arthropathy  Clinical exam suggested facet mediated pain  Patient recently had a second diagnostic lumbar medial branch nerve block  Today here for follow-up  Reports 100% improvement only for a few hours after the diagnostic block with improved activity and range of motion that faded away by the end of the day and now she is back to the baseline    S/P:Bilateral Lumbar Medial Branch nerve Blocks at the transverse processes of L3, L4, L5 and sacral  ala under fluoroscopy guidance # 2    Considering excellent short-term relief after the second diagnostic lumbar medial branch nerve block  I will recommend now to proceed with lumbar medial branch nerve radiofrequency ablation  Procedure discussed with patient  Patient is interested in proceeding with the treatment  We will submit for prior authorization    Taking baclofen for axial back pain and back pain spasm  Find it helpful  No side effect  We will order refill  1. Lumbar disc herniation    2.  Lumbar facet joint syndrome        Orders Placed This Encounter   Procedures    FACET JOINT L/S SINGLE      Orders Placed This Encounter   Medications    baclofen (LIORESAL) 10 MG tablet     Sig: Take 1 tablet by mouth 2 times daily Take 1 tablet by mouth nightly     Dispense:  60 tablet     Refill:  1            Electronically signed by Ravin Tello MD on 10/21/2021 at 8:39 AM    MRI lumbar spine images were viewed after up loading to PACS 11/09/2000  L4/5 and L5/S1 Grade 5 DDD  consider discogram

## 2021-10-20 ENCOUNTER — TELEPHONE (OUTPATIENT)
Dept: PAIN MANAGEMENT | Age: 31
End: 2021-10-20

## 2021-10-20 NOTE — TELEPHONE ENCOUNTER
Pharmacy called wanting clarification on Rx Baclofen 10 mg written yesterday. Pharmacist needs conflicting sig instructions clarified. Please advise.

## 2021-11-01 ENCOUNTER — HOSPITAL ENCOUNTER (OUTPATIENT)
Dept: PAIN MANAGEMENT | Facility: CLINIC | Age: 31
Discharge: HOME | End: 2021-11-01
Payer: MEDICARE

## 2021-11-01 VITALS
HEIGHT: 63 IN | OXYGEN SATURATION: 97 % | DIASTOLIC BLOOD PRESSURE: 73 MMHG | RESPIRATION RATE: 12 BRPM | TEMPERATURE: 98.1 F | WEIGHT: 254 LBS | HEART RATE: 86 BPM | SYSTOLIC BLOOD PRESSURE: 114 MMHG | BODY MASS INDEX: 45 KG/M2

## 2021-11-01 DIAGNOSIS — R52 PAIN MANAGEMENT: ICD-10-CM

## 2021-11-01 LAB — HCG, PREGNANCY URINE (POC): NEGATIVE

## 2021-11-01 PROCEDURE — 2580000003 HC RX 258: Performed by: ANESTHESIOLOGY

## 2021-11-01 PROCEDURE — 99152 MOD SED SAME PHYS/QHP 5/>YRS: CPT | Performed by: ANESTHESIOLOGY

## 2021-11-01 PROCEDURE — 2500000003 HC RX 250 WO HCPCS: Performed by: ANESTHESIOLOGY

## 2021-11-01 PROCEDURE — 64636 DESTROY L/S FACET JNT ADDL: CPT | Performed by: ANESTHESIOLOGY

## 2021-11-01 PROCEDURE — 81025 URINE PREGNANCY TEST: CPT

## 2021-11-01 PROCEDURE — 64635 DESTROY LUMB/SAC FACET JNT: CPT | Performed by: ANESTHESIOLOGY

## 2021-11-01 PROCEDURE — 64636 DESTROY L/S FACET JNT ADDL: CPT

## 2021-11-01 PROCEDURE — 64635 DESTROY LUMB/SAC FACET JNT: CPT

## 2021-11-01 PROCEDURE — 6360000002 HC RX W HCPCS: Performed by: ANESTHESIOLOGY

## 2021-11-01 RX ORDER — MIDAZOLAM HYDROCHLORIDE 2 MG/2ML
INJECTION, SOLUTION INTRAMUSCULAR; INTRAVENOUS
Status: COMPLETED | OUTPATIENT
Start: 2021-11-01 | End: 2021-11-01

## 2021-11-01 RX ORDER — SODIUM CHLORIDE 0.9 % (FLUSH) 0.9 %
SYRINGE (ML) INJECTION
Status: COMPLETED | OUTPATIENT
Start: 2021-11-01 | End: 2021-11-01

## 2021-11-01 RX ORDER — LIDOCAINE HYDROCHLORIDE 40 MG/ML
INJECTION, SOLUTION RETROBULBAR; TOPICAL
Status: COMPLETED | OUTPATIENT
Start: 2021-11-01 | End: 2021-11-01

## 2021-11-01 RX ORDER — FENTANYL CITRATE 50 UG/ML
INJECTION, SOLUTION INTRAMUSCULAR; INTRAVENOUS
Status: COMPLETED | OUTPATIENT
Start: 2021-11-01 | End: 2021-11-01

## 2021-11-01 RX ORDER — LIDOCAINE HYDROCHLORIDE 10 MG/ML
INJECTION, SOLUTION EPIDURAL; INFILTRATION; INTRACAUDAL; PERINEURAL
Status: COMPLETED | OUTPATIENT
Start: 2021-11-01 | End: 2021-11-01

## 2021-11-01 RX ADMIN — FENTANYL CITRATE 100 MCG: 50 INJECTION INTRAMUSCULAR; INTRAVENOUS at 07:37

## 2021-11-01 RX ADMIN — LIDOCAINE HYDROCHLORIDE 6 ML: 40 SOLUTION RETROBULBAR; TOPICAL at 07:52

## 2021-11-01 RX ADMIN — LIDOCAINE HYDROCHLORIDE 5 ML: 10 INJECTION, SOLUTION EPIDURAL; INFILTRATION; INTRACAUDAL at 07:38

## 2021-11-01 RX ADMIN — MIDAZOLAM HYDROCHLORIDE 2 MG: 1 INJECTION, SOLUTION INTRAMUSCULAR; INTRAVENOUS at 07:37

## 2021-11-01 RX ADMIN — Medication 3 ML: at 07:37

## 2021-11-01 ASSESSMENT — PAIN - FUNCTIONAL ASSESSMENT
PAIN_FUNCTIONAL_ASSESSMENT: 0-10
PAIN_FUNCTIONAL_ASSESSMENT: PREVENTS OR INTERFERES WITH ALL ACTIVE AND SOME PASSIVE ACTIVITIES
PAIN_FUNCTIONAL_ASSESSMENT: 0-10

## 2021-11-01 ASSESSMENT — PAIN DESCRIPTION - DESCRIPTORS: DESCRIPTORS: SHOOTING;THROBBING

## 2021-11-01 NOTE — OP NOTE
Preoperative Diagnosis: Lumbar spondylosis w/o myelopathy, chronic low back pain  Postoperative Diagnosis: Lumbar spondylosis w/o myelopathy, chronic low back pain  SEDATION: SEE SEDATION NOTE  BLOOD LOSS: NONE    Procedure Performed:  :Radiofrequency ablation of median branches at the Transverse processes of L4, L5 AND SACRAL ALA for L4/5 AND L5/S1 facet joints on Bilateral under fluoroscopic guidance with IV sedation. t    Procedure:    After starting an IV, the patient was taken to procedure room. The patient was placed in prone position and skin over the back was prepped and draped in sterile manner. Standard monitors were connected and vitals were monitored during the case and they remained stable during the procedure. A meaningful communication was kept up with the patient throughout the procedure. Then using fluoroscopy the junction of the transverse process of the target vertebra with the superior process of the facet joint was observed and the view was optimized. The skin and deep tissues were infiltrated with 2 ml of  1 % lidocaine. The RF canula with the 10 mm active tip was introduced through the skin wheal under fluoroscopy guidance such that the tip of the needle lies in the groove of the transverse process with the superior processes of the facet joint. Then a lateral and AP view of the lumbar spine was obtained to make sure the tip of needle is not in the neural foramen. Then electric impedence was checked to make sure it is acceptable. Then a sensory stimulus was applied at 50 Hz up to 0.5 volt and concordant pain symptoms were reproduced. Then a motor stimulus was applied at 2 Hz up to 2 volts or 3 x times the sensory stimulus and no motor stimulation was seen in lower extremities. Some multifidus stimulus was seen. Then after negative aspiration 1 ml of 4% lidocaine was injected through the needle at each level. The radiofrequency lesion was done at 85 degrees centigrade for 110

## 2021-11-01 NOTE — H&P
UPDATE:  Office visit pain clinic in [facility] with all required elements of H&P dated 10/19/2021  Patient seen preop, chart reviewed, AAO x 3, in NAD, VSS,. No changes in medical history since last evaluation. Risk / Benefits explained to patient, patient agree to proceed with plan.   ASA 2  MP 2

## 2021-11-03 NOTE — TELEPHONE ENCOUNTER
Patricia Dutton is requesting a refill on the following medications:   Requested Prescriptions     Pending Prescriptions Disp Refills    meloxicam (MOBIC) 15 MG tablet [Pharmacy Med Name: Meloxicam 15 MG Oral Tablet] 30 tablet 0     Sig: Take 1 tablet by mouth once daily       Last OV 10/19/21    Future Appointments   Date Time Provider Sajan Pardo   11/22/2021  3:00 PM FUNMILAYO Whaley CNM [facility]   11/23/2021  1:00 PM Petra Gee MD [facility]       OARRS report sent to Dr. Torey Wilson through alternative route for review.

## 2021-11-03 NOTE — TELEPHONE ENCOUNTER
PT called in and stated she needs this medication filled as soon as possible. Pt is going out of town Saturday.

## 2021-11-04 RX ORDER — MELOXICAM 15 MG/1
TABLET ORAL
Qty: 30 TABLET | Refills: 0 | Status: SHIPPED | OUTPATIENT
Start: 2021-11-04 | End: 2022-01-03 | Stop reason: SDUPTHER

## 2021-11-16 ENCOUNTER — TELEPHONE (OUTPATIENT)
Dept: FAMILY MEDICINE CLINIC | Age: 31
End: 2021-11-16

## 2021-11-16 NOTE — TELEPHONE ENCOUNTER
----- Message from Boy Marinelli sent at 11/16/2021  3:31 PM EST -----  Subject: Message to Provider    QUESTIONS  Information for Provider? pt of Dr. Alee Jerez is having surgery 12/10, left   knee scope Dr. Jarek Sabillon will be doing it at [facility] if any questions   please call [phone]  ---------------------------------------------------------------------------  --------------  [address]  What is the best way for the office to contact you? OK to leave message on   voicemail  Preferred Call Back Phone Number? [phone]  ---------------------------------------------------------------------------  --------------  SCRIPT ANSWERS  Relationship to Patient? Third Party  Representative Name?  [facility]

## 2021-11-22 ENCOUNTER — OFFICE VISIT (OUTPATIENT)
Dept: OBGYN CLINIC | Age: 31
End: 2021-11-22
Payer: MEDICARE

## 2021-11-22 ENCOUNTER — HOSPITAL ENCOUNTER (OUTPATIENT)
Age: 31
Setting detail: SPECIMEN
Discharge: HOME | End: 2021-11-22

## 2021-11-22 VITALS
SYSTOLIC BLOOD PRESSURE: 132 MMHG | DIASTOLIC BLOOD PRESSURE: 87 MMHG | HEART RATE: 88 BPM | HEIGHT: 63 IN | WEIGHT: 266 LBS | BODY MASS INDEX: 47.13 KG/M2

## 2021-11-22 DIAGNOSIS — N92.6 IRREGULAR BLEEDING: ICD-10-CM

## 2021-11-22 DIAGNOSIS — Z01.419 WOMEN'S ANNUAL ROUTINE GYNECOLOGICAL EXAMINATION: Primary | ICD-10-CM

## 2021-11-22 DIAGNOSIS — Z30.42 SURVEILLANCE FOR DEPO-PROVERA CONTRACEPTION: ICD-10-CM

## 2021-11-22 PROCEDURE — G8484 FLU IMMUNIZE NO ADMIN: HCPCS | Performed by: ADVANCED PRACTICE MIDWIFE

## 2021-11-22 PROCEDURE — 99395 PREV VISIT EST AGE 18-39: CPT | Performed by: ADVANCED PRACTICE MIDWIFE

## 2021-11-22 PROCEDURE — 96372 THER/PROPH/DIAG INJ SC/IM: CPT | Performed by: ADVANCED PRACTICE MIDWIFE

## 2021-11-22 RX ORDER — MEDROXYPROGESTERONE ACETATE 150 MG/ML
150 INJECTION, SUSPENSION INTRAMUSCULAR ONCE
Status: COMPLETED | OUTPATIENT
Start: 2021-11-22 | End: 2021-11-22

## 2021-11-22 RX ADMIN — MEDROXYPROGESTERONE ACETATE 150 MG: 150 INJECTION, SUSPENSION INTRAMUSCULAR at 14:37

## 2021-11-22 SDOH — ECONOMIC STABILITY: FOOD INSECURITY: WITHIN THE PAST 12 MONTHS, YOU WORRIED THAT YOUR FOOD WOULD RUN OUT BEFORE YOU GOT MONEY TO BUY MORE.: NEVER TRUE

## 2021-11-22 SDOH — ECONOMIC STABILITY: FOOD INSECURITY: WITHIN THE PAST 12 MONTHS, THE FOOD YOU BOUGHT JUST DIDN'T LAST AND YOU DIDN'T HAVE MONEY TO GET MORE.: NEVER TRUE

## 2021-11-22 ASSESSMENT — ANXIETY QUESTIONNAIRES
5. BEING SO RESTLESS THAT IT IS HARD TO SIT STILL: 1
3. WORRYING TOO MUCH ABOUT DIFFERENT THINGS: 1
6. BECOMING EASILY ANNOYED OR IRRITABLE: 1
7. FEELING AFRAID AS IF SOMETHING AWFUL MIGHT HAPPEN: 0
2. NOT BEING ABLE TO STOP OR CONTROL WORRYING: 1
1. FEELING NERVOUS, ANXIOUS, OR ON EDGE: 1
GAD7 TOTAL SCORE: 6
4. TROUBLE RELAXING: 1

## 2021-11-22 ASSESSMENT — SOCIAL DETERMINANTS OF HEALTH (SDOH)
WITHIN THE LAST YEAR, HAVE TO BEEN RAPED OR FORCED TO HAVE ANY KIND OF SEXUAL ACTIVITY BY YOUR PARTNER OR EX-PARTNER?: NO
WITHIN THE LAST YEAR, HAVE YOU BEEN AFRAID OF YOUR PARTNER OR EX-PARTNER?: NO
WITHIN THE LAST YEAR, HAVE YOU BEEN KICKED, HIT, SLAPPED, OR OTHERWISE PHYSICALLY HURT BY YOUR PARTNER OR EX-PARTNER?: NO
HOW HARD IS IT FOR YOU TO PAY FOR THE VERY BASICS LIKE FOOD, HOUSING, MEDICAL CARE, AND HEATING?: NOT HARD AT ALL
WITHIN THE LAST YEAR, HAVE YOU BEEN HUMILIATED OR EMOTIONALLY ABUSED IN OTHER WAYS BY YOUR PARTNER OR EX-PARTNER?: NO

## 2021-11-22 NOTE — PROGRESS NOTES
Pt is here today for her annual  Pt has no questions or concerns     [id number]    After obtaining consent, and per orders of  Paty williamson CNM injection of depo given in Left upper quad. gluteus by Ena Stout MA. Patient instructed to remain in clinic for 20 minutes afterwards, and to report any adverse reaction to me immediately.

## 2021-11-22 NOTE — PROGRESS NOTES
[facility] AND GYNECOLOGY   [facility] [address] [address] [place] [id number]  Dept: [phone]    Patient Name: To Puckett  Patient Age: 36 y.o. Date of Visit: 11/22/2021    Subjective  Chief Complaint   Patient presents with    Gynecologic Exam     last pap 08/24/18     No LMP recorded. Patient has had an injection. Chaperone for Intimate Exam   Chaperone was offered as part of the rooming process. Patient declined and agrees to continue with exam without a chaperone. HPI  Patient arrives for annual exam.  Patient denies concerns today. Patient reports is  sexually active with 1 male partner(s). Patient denies  pain with sex . Patient denies a history of sexually transmitted infection(s). Patient does not want screening for sexually transmitted infection(s). Reports is  on contraception Depo provera for cycle control. Is due for pap. PHQ Scores 3/16/2021 9/28/2020 3/4/2020 1/14/2019 10/9/2018 7/25/2017   PHQ2 Score 0 2 3 5 2 4   PHQ9 Score 0 2 10 12 2 17     Interpretation of Total Score Depression Severity: 1-4 = Minimal depression, 5-9 = Mild depression, 10-14 = Moderate depression, 15-19 = Moderately severe depression, 20-27 = Severe depression  NIKOLAS 7 SCORE 11/22/2021   NIKOLAS-7 Total Score 6     Interpretation of NIKOLAS-7 score: 5-9 = mild anxiety, 10-14 = moderate anxiety, 15+ = severe anxiety. Recommend referral to behavioral health for scores 10 or greater. Intimate Partner Violence: Not At Risk    Fear of Current or Ex-Partner: No    Emotionally Abused: No    Physically Abused: No    Sexually Abused: No       Review of Systems   Constitutional: Negative. HENT: Negative. Eyes: Negative. Respiratory: Negative. Cardiovascular: Negative. Gastrointestinal: Negative. Endocrine: Negative. Genitourinary: Positive for menstrual problem. Controlled with Depo. Musculoskeletal: Negative. Skin: Negative. General: Skin is warm and dry. Findings: No rash. Neurological:      Mental Status: She is alert and oriented to person, place, and time. Psychiatric:         Behavior: Behavior normal.         Thought Content: Thought content normal.         Judgment: Judgment normal.          Assessment & Plan  There are no diagnoses linked to this encounter. No follow-ups on file. The patient, Jessica Syed , was seen with a total time spent of 25 minutes for the visit on this date of service by the [facility]  The time component, involved both face-to-face (counseling and education)  and non face-to-face time (care coordination), spent in determining the total time component. She was also counseled on her preventative health maintenance recommendations and follow-up.     Electronically Signed by FUNMILAYO Staples CNM

## 2021-11-23 ENCOUNTER — OFFICE VISIT (OUTPATIENT)
Dept: PAIN MANAGEMENT | Age: 31
End: 2021-11-23
Payer: MEDICARE

## 2021-11-23 VITALS
WEIGHT: 264.2 LBS | HEART RATE: 98 BPM | OXYGEN SATURATION: 96 % | TEMPERATURE: 97 F | BODY MASS INDEX: 46.81 KG/M2 | HEIGHT: 63 IN | DIASTOLIC BLOOD PRESSURE: 76 MMHG | SYSTOLIC BLOOD PRESSURE: 109 MMHG

## 2021-11-23 DIAGNOSIS — M51.36 DDD (DEGENERATIVE DISC DISEASE), LUMBAR: Primary | ICD-10-CM

## 2021-11-23 DIAGNOSIS — G89.29 CHRONIC BILATERAL LOW BACK PAIN WITHOUT SCIATICA: ICD-10-CM

## 2021-11-23 DIAGNOSIS — M54.50 CHRONIC BILATERAL LOW BACK PAIN WITHOUT SCIATICA: ICD-10-CM

## 2021-11-23 PROBLEM — M51.369 DDD (DEGENERATIVE DISC DISEASE), LUMBAR: Status: ACTIVE | Noted: 2021-11-23

## 2021-11-23 PROCEDURE — 1036F TOBACCO NON-USER: CPT | Performed by: ANESTHESIOLOGY

## 2021-11-23 PROCEDURE — G8484 FLU IMMUNIZE NO ADMIN: HCPCS | Performed by: ANESTHESIOLOGY

## 2021-11-23 PROCEDURE — G8427 DOCREV CUR MEDS BY ELIG CLIN: HCPCS | Performed by: ANESTHESIOLOGY

## 2021-11-23 PROCEDURE — G8417 CALC BMI ABV UP PARAM F/U: HCPCS | Performed by: ANESTHESIOLOGY

## 2021-11-23 PROCEDURE — 99213 OFFICE O/P EST LOW 20 MIN: CPT | Performed by: ANESTHESIOLOGY

## 2021-11-23 RX ORDER — QUETIAPINE FUMARATE 100 MG/1
TABLET, FILM COATED ORAL
COMMUNITY
Start: 2021-11-03 | End: 2022-06-09 | Stop reason: ALTCHOICE

## 2021-11-23 RX ORDER — BUPROPION HYDROCHLORIDE 150 MG/1
TABLET ORAL
COMMUNITY
Start: 2021-11-03 | End: 2022-10-27

## 2021-11-23 RX ORDER — VILAZODONE HYDROCHLORIDE 40 MG/1
TABLET ORAL
COMMUNITY
Start: 2021-11-03 | End: 2022-03-29

## 2021-11-23 RX ORDER — BACLOFEN 10 MG/1
TABLET ORAL
COMMUNITY
Start: 2021-11-18 | End: 2022-02-08 | Stop reason: SDUPTHER

## 2021-11-23 ASSESSMENT — ENCOUNTER SYMPTOMS
BACK PAIN: 1
GASTROINTESTINAL NEGATIVE: 1
RESPIRATORY NEGATIVE: 1
EYES NEGATIVE: 1
RESPIRATORY NEGATIVE: 1
ALLERGIC/IMMUNOLOGIC NEGATIVE: 1

## 2021-11-23 NOTE — PROGRESS NOTES
The patient is a 36 y. o. Non- / non  female. Chief Complaint   Patient presents with    Back Pain    Follow Up After Procedure        HPI    53-year-old woman with past medical history significant for depression and  Taking several psych medication  Seen in our clinic history of chronic back pain  Pain is predominantly axial located in the lower lumbar area across midline is a constant aching pain that aggravates with routine activity particularly lifting bending twisting turning  Reports some extension of the pain down left leg over gluteal region and posterior thigh  No significant radiation below the knee level  Patient tried therapy 2 years ago  Had recent MRI that showed L4 and L5 level degenerative disc changes  She received a lumbar epidural injection and facet RFA  Today report no significant improvement in pain overall 15 to 20% total improvement  Pain is interfering with quality of life  Requesting surgical evaluation    S/P: Radiofrequency ablation of median branches at the Transverse processes of L4, L5 AND SACRAL ALA for L4/5 AND L5/S1 facet joints on Bilateral DOS 11/01/21      Outcome   Any improvement of activity?   Yes   Any side effects (appetite,leg cramping,facial fleshing):no   Increase of pain:  Yes  Pain score Today:  7  % of pain relief:15-20 %  Pain diary (medial branch block): No    Lab Results   Component Value Date    LABA1C 6.1 (H) 09/28/2020     Lab Results   Component Value Date     09/28/2020         Past Medical History:   Diagnosis Date    ADHD     Anxiety     Back pain     Brain injury ([address]     Depression     Hearing loss     Learning disability     Migraine headache     Obese         Past Surgical History:   Procedure Laterality Date    OTHER SURGICAL HISTORY Bilateral 03/25/2021    lumbar DENIZ    PAIN MANAGEMENT PROCEDURE Bilateral 12/14/2020    EPIDURAL STEROID INJECTION BILATERAL L5 performed by Erica Murray MD at [place] [address] MANAGEMENT PROCEDURE Bilateral 2021    EPIDURAL STEROID INJECTION BILATERAL L5 performed by Erica Murray MD at [address] Bilateral 3/25/2021    LUMBAR FACET STEROID INJECTION BILATERAL L4 / 5 performed by Erica Murray MD at [facility]  04/15/2021    MID BACK CYST LESION BIOPSY EXCISION    SKIN BIOPSY N/A 4/15/2021    MID BACK CYST LESION BIOPSY EXCISION performed by Freddie Maldonado DO at [address] [place] [address] EXTRACTION         Social History     Socioeconomic History    Marital status: Single     Spouse name: None    Number of children: None    Years of education: None    Highest education level: None   Occupational History    None   Tobacco Use    Smoking status: Former Smoker     Types: Cigarettes     Quit date: 2016     Years since quittin.8    Smokeless tobacco: Never Used   Vaping Use    Vaping Use: Former   Substance and Sexual Activity    Alcohol use: Never     Alcohol/week: 0.0 standard drinks    Drug use: No    Sexual activity: Yes   Other Topics Concern    None   Social History Narrative    None     Social Determinants of Health     Financial Resource Strain: Low Risk     Difficulty of Paying Living Expenses: Not hard at all   Food Insecurity: No Food Insecurity    Worried About [facility] in the Last Year: Never true    Maya of Food in the Last Year: Never true   Transportation Needs: No Transportation Needs    Lack of Transportation (Medical): No    Lack of Transportation (Non-Medical):  No   Physical Activity:     Days of Exercise per Week: Not on file    Minutes of Exercise per Session: Not on file   Stress:     Feeling of Stress : Not on file   Social Connections:     Frequency of Communication with Friends and Family: Not on file    Frequency of Social Gatherings with Friends and Family: Not on file    Attends Spiritism Services: Not on file   [employer] or 12 HOURS AS NEEDED FOR BODY ACHES  4    buPROPion (WELLBUTRIN XL) 150 MG extended release tablet       QUEtiapine (SEROQUEL) 100 MG tablet       OXcarbazepine (TRILEPTAL) 150 MG tablet TAKE 1 TABLET BY MOUTH IN THE MORNING FOR 2 WEEKS AND THEN 1 TAB TWICE DAILY (Patient not taking: Reported on 11/23/2021)      topiramate (TOPAMAX) 50 MG tablet TAKE 1 TABLET BY MOUTH EVERY DAY AT BEDTIME (Patient not taking: Reported on 10/19/2021)      Elastic Bandages & Supports (LUMBAR BACK BRACE/SUPPORT PAD) MISC 1 Units by Does not apply route daily PNEUMATIC OFF LOADNG BACK BRACE (Patient not taking: Reported on 9/21/2021) 1 each 0    Elastic Bandages & Supports (LUMBAR BACK BRACE/SUPPORT PAD) MISC 1 Units by Does not apply route daily (Patient not taking: Reported on 9/21/2021) 1 each 0     No current facility-administered medications for this visit. Review of Systems   Constitutional: Negative. Negative for fever. Respiratory: Negative. Musculoskeletal: Positive for back pain and myalgias. Psychiatric/Behavioral: Positive for sleep disturbance. The patient is nervous/anxious. Depression         Objective:  General Appearance:  Well-appearing, in no acute distress, uncomfortable and in pain. Vital signs: (most recent): Blood pressure 109/76, pulse 98, temperature 97 °F (36.1 °C), height 5' 3\" (1.6 m), weight 264 lb 3.2 oz (119.8 kg), SpO2 96 %, not currently breastfeeding. Vital signs are normal.  No fever. Output: Producing urine and producing stool. HEENT: Normal HEENT exam.    Lungs:  Normal effort and normal respiratory rate. Breath sounds clear to auscultation. She is not in respiratory distress. Heart: Normal rate. Extremities: Normal range of motion. There is no deformity. Neurological: Patient is alert and oriented to person, place and time. Patient has normal coordination. Pupils:  Pupils are equal, round, and reactive to light.   Pupils are equal.   Skin:  Warm and

## 2021-12-01 ENCOUNTER — TELEPHONE (OUTPATIENT)
Dept: PAIN MANAGEMENT | Age: 31
End: 2021-12-01

## 2021-12-01 DIAGNOSIS — G89.29 CHRONIC BILATERAL LOW BACK PAIN WITHOUT SCIATICA: ICD-10-CM

## 2021-12-01 DIAGNOSIS — M51.36 DDD (DEGENERATIVE DISC DISEASE), LUMBAR: Primary | ICD-10-CM

## 2021-12-01 DIAGNOSIS — M54.50 CHRONIC BILATERAL LOW BACK PAIN WITHOUT SCIATICA: ICD-10-CM

## 2021-12-01 LAB — CYTOLOGY REPORT: NORMAL

## 2021-12-01 NOTE — TELEPHONE ENCOUNTER
Pt called in and states she attempted to call and schedule an appt with Dr. Miguel Angel Lai as directed by Dr. Sarbjit Alcantar. Dr. Cory Ferrera office stated they never received referral. Pt is requesting the referral to be resent.

## 2021-12-02 NOTE — TELEPHONE ENCOUNTER
Patient states Dr. Mino Esparza referred her to Neurosurgeon Dr. Darrelyn Fleischer at [facility], however, that physician relocated to [place]. Pt states there is another Neurosurgeon at [facility] Dr. Madera [place] she would like a new referral sent too. Writer informed pt that a generic neurosurgery referral will be faxed so that she can schedule appointment.  Pt wants to stick with [facility] neurosurgery due to the location

## 2021-12-13 ENCOUNTER — OFFICE VISIT (OUTPATIENT)
Dept: PODIATRY | Age: 31
End: 2021-12-13
Payer: MEDICARE

## 2021-12-13 VITALS — BODY MASS INDEX: 46.78 KG/M2 | WEIGHT: 264 LBS | HEIGHT: 63 IN

## 2021-12-13 DIAGNOSIS — M72.2 PLANTAR FASCIITIS, RIGHT: Primary | ICD-10-CM

## 2021-12-13 PROCEDURE — 1036F TOBACCO NON-USER: CPT | Performed by: PODIATRIST

## 2021-12-13 PROCEDURE — G8417 CALC BMI ABV UP PARAM F/U: HCPCS | Performed by: PODIATRIST

## 2021-12-13 PROCEDURE — G8484 FLU IMMUNIZE NO ADMIN: HCPCS | Performed by: PODIATRIST

## 2021-12-13 PROCEDURE — G8427 DOCREV CUR MEDS BY ELIG CLIN: HCPCS | Performed by: PODIATRIST

## 2021-12-13 PROCEDURE — 99213 OFFICE O/P EST LOW 20 MIN: CPT | Performed by: PODIATRIST

## 2021-12-13 NOTE — PATIENT INSTRUCTIONS
Schedule a Vaccine  When you qualify to receive the vaccine, call the [facility]) COVID-19 Vaccination Hotline to schedule your appointment or to get additional information about the [facility]) locations which are offering the COVID-19 vaccine. To be 94% effective, it's important that you receive two doses of one of the COVID-19 vaccines. -If you are receiving the [place] vaccine, your second shot will be scheduled as close to 21 days after the first shot as possible. -If you are receiving the Moderna vaccine, your second shot will be scheduled as close to 28 days after the first shot as possible. [facility]) COVID-19 Vaccination Hotline: [phone]    Links to [facility]) website and [facility] website:    [employer]/[email or web address]    [email or web address]

## 2021-12-13 NOTE — PROGRESS NOTES
[facility]  [facility]  [address] [place] [address]  Dept: [phone]  Dept Fax: [phone]    RETURN PATIENT PROGRESS NOTE  Date of patient's visit: 12/13/2021  Patient's Name:  Yusuf Ford YOB: 1990            Patient Care Team:  Maximiliano Aguirre MD as PCP - General (Family Medicine)  Maximiliano Aguirre MD as PCP - REHABILITATION [facility] Empaneled Provider  Pablo Meraz MD as Consulting Physician (Obstetrics & Gynecology)  Joslyn Tripp DPM as Physician (Podiatry)       [facility] 36 y.[facility]. female that presents for follow-up of   Chief Complaint   Patient presents with    Foot Pain     right foot       Symptoms began several month(s) ago and are unchanged . Patient relates pain is Present to right heel. Pain is rated 5 out of 10 and is described as constant, moderate, severe. Treatments prior to today's visit include: previous podiatry treatment. Currently denies F/C/N/V. Allergies   Allergen Reactions    Cat Hair Extract     Dust Mite Extract     Molds & Smuts     Seasonal        Past Medical History:   Diagnosis Date    ADHD     Anxiety     Back pain     Brain injury ([address]     Depression     Hearing loss     Learning disability     Migraine headache     Obese        Prior to Admission medications    Medication Sig Start Date End Date Taking?  Authorizing Provider   baclofen (LIORESAL) 10 MG tablet  11/18/21  Yes Historical Provider, MD   buPROPion (WELLBUTRIN XL) 150 MG extended release tablet  11/3/21  Yes Historical Provider, MD   QUEtiapine (SEROQUEL) 100 MG tablet  11/3/21  Yes Historical Provider, [place]   VILAZODONE HCL 40 MG Tablet Indications: pt states taking 60 mg  11/3/21  Yes Historical Provider, MD   meloxicam (MOBIC) 15 MG tablet Take 1 tablet by mouth once daily 11/4/21  Yes Pako Wren MD   lurasidone (LATUDA) 60 MG TABS tablet Take 60 mg by mouth daily 7/13/21  Yes Historical Provider, MD   OXcarbazepine (TRILEPTAL) 150 MG tablet TAKE 1 TABLET BY MOUTH IN THE MORNING FOR 2 WEEKS AND THEN 1 TAB TWICE DAILY 4/22/21  Yes Historical Provider, MD   topiramate (TOPAMAX) 50 MG tablet TAKE 1 TABLET BY MOUTH EVERY DAY AT BEDTIME 5/7/21  Yes Historical Provider, MD   ketoconazole (NIZORAL) 2 % shampoo Apply 3-4 times weekly to scalp, leave on for five minutes prior to washing off 5/12/21  Yes Janae Benitez MD   Elastic Bandages & Supports (LUMBAR BACK BRACE/SUPPORT PAD) MISC 1 Units by Does not apply route daily PNEUMATIC OFF LOADNG BACK BRACE 3/17/21 3/17/22 Yes Lindbergh Dakin, MD   FLUoxetine (PROZAC) 20 MG tablet 20 mg 2 times daily  12/26/20  Yes Historical Provider, MD   medroxyPROGESTERone (DEPO-PROVERA) 150 MG/ML injection INJECT 1 ML INTRAMUSCULARY ONCE EVERY THREE MONTHS 3/15/21  Yes FUNMILAYO Lepe - EDUARDO   cariprazine hcl (VRAYLAR) 3 MG CAPS capsule Take by mouth daily    Yes Historical Provider, MD   diclofenac (VOLTAREN) 75 MG EC tablet TAKE 1 TABLET BY MOUTH EVERY 12 HOURS AS NEEDED FOR BODY ACHES 9/6/19  Yes Historical Provider, MD   Elastic Bandages & Supports (LUMBAR BACK BRACE/SUPPORT PAD) MISC 1 Units by Does not apply route daily  Patient not taking: Reported on 9/21/2021 10/20/20 10/20/21  Lindbergh Dakin, MD       Review of Systems    Review of Systems:  History obtained from chart review and the patient  General ROS: negative for - chills, fatigue, fever, night sweats or weight gain  Constitutional: Negative for chills, diaphoresis, fatigue, fever and unexpected weight change. Musculoskeletal: Positive for arthralgias, gait problem and joint swelling. Neurological ROS: negative for - behavioral changes, confusion, headaches or seizures. Negative for weakness and numbness. Dermatological ROS: negative for - mole changes, rash  Cardiovascular: Negative for leg swelling. Gastrointestinal: Negative for constipation, diarrhea, nausea and vomiting. Lower Extremity Physical Examination:     Vitals:  There were no vitals filed for this visit. General: AAO x 3 in NAD. Dermatologic Exam:  Skin lesion/ulceration Absent . Skin No rashes or nodules noted. .       Musculoskeletal:     1st MPJ ROM decreased, Bilateral.  Muscle strength 5/5, Bilateral. POP of the right plantar medial calcaneal tuberosity. Pain increased with Dorsiflexion of the right and left lesser toes. Negative pain on compression of the calcaneus bilaterally Medial longitudinal arch, Bilateral WNL. Ankle ROM WNL,Bilateral.    Dorsally contracted digits absent digits 1-5 Bilateral.     Vascular: DP and PT pulses palpable 2/4, Bilateral.  CFT <3 seconds, Bilateral.  Hair growth present to the level of the digits, Bilateral.  Edema absent, Bilateral.  Varicosities absent, Bilateral. Erythema absent, Bilateral    Neurological: Sensation intact to light touch to level of digits, Bilateral.  Protective sensation intact 10/10 sites via 5.07/10g [place] Monofilament, Bilateral.  negative Tinel's, Bilateral.  negative Valleix sign, Bilateral.      Integument: Warm, dry, supple, Bilateral.  Open lesion absent, Bilateral.  Interdigital maceration absent to web spaces 1-4, Bilateral.  Nails are normal in length, thickness and color 1-5 bilateral.  Fissures absent, Bilateral.           Asessment: Patient is a 36 y.o. female with:    Diagnosis Orders   1. Plantar fasciitis, right           Plan: Patient examined and evaluated. Current condition and treatment options discussed in detail. Dispensed powertsteps today. Advised pt to consider cortisone at next visit if pain persists. Patient Instructions: Plantar Fasciitis    Plantar Fasciitis is inflammation of the long fibrous band on the bottom of the foot (plantar fascia), especially in the area of its attachment to the heel bone (calcaneus). The plantar fascia is also intimately related to the Achilles tendon and therefore stretching of the calf muscles is also important for treatment. 1. Stretching: Perform 3-4 times daily, 2-3 minutes per side      -Before getting out of bed, place a towel around the ball of your foot and       pull back, holding for 30 seconds. Repeat with other foot.      -Place a tennis ball on the floor. Roll the tennis ball under your foot for      10-15 minutes. Repeat with other foot. -Perform calf stretches: stand facing a wall with your hands on the wall,      place one leg a step behind the other. Keeping your back heel on the      floor, bend your front knee (lean into the wall), holding for 30 seconds. Repeat with other leg. 2. Icing: Perform 2-3 times daily      -Place a 12 oz plastic water bottle in the freezer. Once frozen, remove     and roll the bottle under your foot for 10-15 min. 3. Anti-inflammatory Medication      -Begin daily regimen of anti-inflammatory medication (Ibuprofen,       Naproxen, Naprosyn) as discussed during your visit. Verbal and written instructions given to patient. Contact office with any questions/problems/concerns. No orders of the defined types were placed in this encounter. No orders of the defined types were placed in this encounter. RTC in 1month(s).     12/13/2021      Electronically signed by Marifer Veliz DPM on 12/13/2021 at 11:21 AM  12/13/2021

## 2022-01-03 ENCOUNTER — TELEPHONE (OUTPATIENT)
Dept: OBGYN CLINIC | Age: 32
End: 2022-01-03

## 2022-01-03 RX ORDER — BACLOFEN 10 MG/1
10 TABLET ORAL 2 TIMES DAILY
Qty: 60 TABLET | Refills: 0 | Status: SHIPPED | OUTPATIENT
Start: 2022-01-03 | End: 2022-02-02

## 2022-01-03 RX ORDER — MELOXICAM 15 MG/1
TABLET ORAL
Qty: 30 TABLET | Refills: 0 | Status: SHIPPED | OUTPATIENT
Start: 2022-01-03 | End: 2022-02-08 | Stop reason: SDUPTHER

## 2022-01-03 NOTE — TELEPHONE ENCOUNTER
----- Message from Raymon Peabody sent at 1/3/2022 12:24 PM EST -----  Regarding: Patient called checking on her Medication Refills  The patient called checking on her needed Medication Refill of Meloxicam. She called her pharmacy this morning & was informed they hadn't recv.'d a refill request from Dr. Katelyn Manning. The Pt requested a call back. (If possible this afternoon) She can be reached at [phone].

## 2022-01-03 NOTE — TELEPHONE ENCOUNTER
Kayla Rowley is requesting a refill on the following medications:   Requested Prescriptions     Pending Prescriptions Disp Refills    meloxicam (MOBIC) 15 MG tablet 30 tablet 0     Sig: Take 1 tablet by mouth once daily    baclofen (LIORESAL) 10 MG tablet 60 tablet 1     Sig: Take 1 tablet by mouth 2 times daily Take 1 tablet by mouth nightly       Last OV 11/23/21    Future Appointments   Date Time Provider Sajan Pardo   1/17/2022 10:45 AM [facility] Stanislaw Rob   2/7/2022  2:15 PM SCHEDULE, [id number]       OAR[facility] report sent to Dr. Janelle Chacko through alternative route for review.

## 2022-01-03 NOTE — TELEPHONE ENCOUNTER
----- Message from FUNMILAYO Gonzalez CNM sent at 1/1/2022 12:44 PM EST -----  Regarding: fix note  I still cannot close this. Looks like you have to document injection site. I tried to fix it but couldn't.

## 2022-01-03 NOTE — TELEPHONE ENCOUNTER
----- Message from Geeta Danni sent at 1/3/2022 12:24 PM EST -----  Regarding: Patient called checking on her Medication Refills  The patient called checking on her needed Medication Refill of Meloxicam. She called her pharmacy this morning & was informed they hadn't recv.'d a refill request from Dr. Eh Mann. The Pt requested a call back. (If possible this afternoon) She can be reached at [phone].

## 2022-01-04 NOTE — TELEPHONE ENCOUNTER
LVM informing pt that prescriptions were sent to pharmacy and she needs to call the office to schedule a follow up appointment with Barbara All in one month for the next refills

## 2022-02-01 DIAGNOSIS — Z30.42 ENCOUNTER FOR SURVEILLANCE OF INJECTABLE CONTRACEPTIVE: ICD-10-CM

## 2022-02-01 NOTE — TELEPHONE ENCOUNTER
Radha Rubio is requesting a refill on depo.      Last Annual visit:  11/22/2021  Next Office visit:  02/07/2022    Currently Pregnant no  Last OB visit: n/a  Next OB visit: n/a    Last prescribing provider:  Socorro Flores    * patient will schedule annual at nurse visit on 02/07/2022

## 2022-02-02 RX ORDER — MEDROXYPROGESTERONE ACETATE 150 MG/ML
INJECTION, SUSPENSION INTRAMUSCULAR
Qty: 1 ML | Refills: 0 | Status: SHIPPED | OUTPATIENT
Start: 2022-02-02 | End: 2022-04-19

## 2022-02-07 ENCOUNTER — NURSE ONLY (OUTPATIENT)
Dept: OBGYN CLINIC | Age: 32
End: 2022-02-07
Payer: MEDICARE

## 2022-02-07 VITALS
HEART RATE: 95 BPM | SYSTOLIC BLOOD PRESSURE: 137 MMHG | HEIGHT: 63 IN | DIASTOLIC BLOOD PRESSURE: 87 MMHG | WEIGHT: 269.9 LBS | BODY MASS INDEX: 47.82 KG/M2

## 2022-02-07 DIAGNOSIS — N94.6 DYSMENORRHEA: Primary | ICD-10-CM

## 2022-02-07 PROCEDURE — 96372 THER/PROPH/DIAG INJ SC/IM: CPT | Performed by: ADVANCED PRACTICE MIDWIFE

## 2022-02-07 PROCEDURE — 99211 OFF/OP EST MAY X REQ PHY/QHP: CPT | Performed by: ADVANCED PRACTICE MIDWIFE

## 2022-02-07 RX ORDER — MEDROXYPROGESTERONE ACETATE 150 MG/ML
150 INJECTION, SUSPENSION INTRAMUSCULAR ONCE
Status: COMPLETED | OUTPATIENT
Start: 2022-02-07 | End: 2022-02-07

## 2022-02-07 RX ADMIN — MEDROXYPROGESTERONE ACETATE 150 MG: 150 INJECTION, SUSPENSION INTRAMUSCULAR at 14:16

## 2022-02-07 NOTE — PROGRESS NOTES
After obtaining consent, and per orders of Krish Orozco CNM, injection of medroxyprogesterone given in Right upper quad. gluteus by Ever Jarquin. Patient instructed to remain in clinic for 20 minutes afterwards, and to report any adverse reaction to me immediately.

## 2022-02-08 ENCOUNTER — OFFICE VISIT (OUTPATIENT)
Dept: PAIN MANAGEMENT | Age: 32
End: 2022-02-08
Payer: MEDICARE

## 2022-02-08 VITALS
BODY MASS INDEX: 46.95 KG/M2 | HEIGHT: 63 IN | SYSTOLIC BLOOD PRESSURE: 117 MMHG | HEART RATE: 93 BPM | WEIGHT: 265 LBS | OXYGEN SATURATION: 100 % | DIASTOLIC BLOOD PRESSURE: 83 MMHG

## 2022-02-08 DIAGNOSIS — M51.36 DDD (DEGENERATIVE DISC DISEASE), LUMBAR: ICD-10-CM

## 2022-02-08 DIAGNOSIS — M54.50 CHRONIC BILATERAL LOW BACK PAIN WITHOUT SCIATICA: Primary | ICD-10-CM

## 2022-02-08 DIAGNOSIS — G89.29 CHRONIC BILATERAL LOW BACK PAIN WITHOUT SCIATICA: Primary | ICD-10-CM

## 2022-02-08 PROCEDURE — 99214 OFFICE O/P EST MOD 30 MIN: CPT | Performed by: ANESTHESIOLOGY

## 2022-02-08 PROCEDURE — G8427 DOCREV CUR MEDS BY ELIG CLIN: HCPCS | Performed by: ANESTHESIOLOGY

## 2022-02-08 PROCEDURE — G8417 CALC BMI ABV UP PARAM F/U: HCPCS | Performed by: ANESTHESIOLOGY

## 2022-02-08 PROCEDURE — 1036F TOBACCO NON-USER: CPT | Performed by: ANESTHESIOLOGY

## 2022-02-08 PROCEDURE — G8484 FLU IMMUNIZE NO ADMIN: HCPCS | Performed by: ANESTHESIOLOGY

## 2022-02-08 RX ORDER — MELOXICAM 15 MG/1
TABLET ORAL
Qty: 30 TABLET | Refills: 1 | Status: SHIPPED | OUTPATIENT
Start: 2022-02-08 | End: 2022-04-25 | Stop reason: SDUPTHER

## 2022-02-08 RX ORDER — BUPROPION HYDROCHLORIDE 300 MG/1
TABLET ORAL
COMMUNITY
Start: 2022-01-30 | End: 2022-06-09 | Stop reason: ALTCHOICE

## 2022-02-08 RX ORDER — BACLOFEN 10 MG/1
10 TABLET ORAL DAILY
Qty: 30 TABLET | Refills: 1 | Status: SHIPPED | OUTPATIENT
Start: 2022-02-08 | End: 2022-04-04 | Stop reason: SDUPTHER

## 2022-02-08 RX ORDER — HYDROXYZINE HYDROCHLORIDE 25 MG/1
TABLET, FILM COATED ORAL
COMMUNITY
Start: 2022-01-25

## 2022-02-08 ASSESSMENT — ENCOUNTER SYMPTOMS
RESPIRATORY NEGATIVE: 1
BACK PAIN: 1

## 2022-02-08 NOTE — PROGRESS NOTES
The patient is a 36 y. o. Non- / non  female.     Chief Complaint   Patient presents with    Back Pain    Follow-up     F/U after MRI & xrays        HPI     Severe back pain located in the lower lumbar area intensity 8/10 aggravates with bending lifting twisting turning interfering with work sleep and quality of life  Pain is constant  Symptoms are progressively worsening  Was recently evaluated by the spine surgeon at [facility] and is not advised for any surgery  Had recent diagnostic work-up with flexion-extension film and MRI lumbar spine  Here to discuss further treatment option  Denies any loss of bladder or bowel control    Patient is here for F/U after seeing spine specialist. Had new MRI & Xrays don  Pain level is a 8   Location is in back   Pain is constant pain   Walking bending sitting standing lifting (everything)  Nothing alleviates the pain      Past Medical History:   Diagnosis Date    ADHD     Anxiety     Back pain     Brain injury ([address]     Depression     Hearing loss     Learning disability     Migraine headache     Obese         Past Surgical History:   Procedure Laterality Date    OTHER SURGICAL HISTORY Bilateral 03/25/2021    lumbar DENIZ    PAIN MANAGEMENT PROCEDURE Bilateral 12/14/2020    EPIDURAL STEROID INJECTION BILATERAL L5 performed by Sanya Coyne MD at [address] [place] [address] Bilateral 1/4/2021    EPIDURAL STEROID INJECTION BILATERAL L5 performed by Sanya Coyne MD at [address] [place] [address] Bilateral 3/25/2021    LUMBAR FACET STEROID INJECTION BILATERAL L4 / 5 performed by S[facility]ne MD at [facility]  04/15/2021    MID BACK CYST LESION BIOPSY EXCISION    SKIN BIOPSY N/A 4/15/2021    MID BACK CYST LESION BIOPSY EXCISION performed by Josefina Moore DO at [address] History     Socioeconomic History    Marital status: Single Spouse name: None    Number of children: None    Years of education: None    Highest education level: None   Occupational History    None   Tobacco Use    Smoking status: Former Smoker     Types: Cigarettes     Quit date: 2016     Years since quittin.1    Smokeless tobacco: Never Used   Vaping Use    Vaping Use: Former   Substance and Sexual Activity    Alcohol use: Never     Alcohol/week: 0.0 standard drinks    Drug use: No    Sexual activity: Yes   Other Topics Concern    None   Social History Narrative    None     Social Determinants of Health     Financial Resource Strain: Low Risk     Difficulty of Paying Living Expenses: Not hard at all   Food Insecurity: No Food Insecurity    Worried About Running Out of Food in the Last Year: Never true    [address] in the Last Year: Never true   Transportation Needs:     Lack of Transportation (Medical): Not on file    Lack of Transportation (Non-Medical):  Not on file   Physical Activity:     Days of Exercise per Week: Not on file    Minutes of Exercise per Session: Not on file   Stress:     Feeling of Stress : Not on file   Social Connections:     Frequency of Communication with Friends and Family: Not on file    Frequency of Social Gatherings with Friends and Family: Not on file    Attends Evangelical Services: Not on file    Active Member of Clubs or Organizations: Not on file    Attends Club or Organization Meetings: Not on file    Marital Status: Not on file   Intimate Partner Violence: Not At Risk    Fear of Current or Ex-Partner: No    Emotionally Abused: No    Physically Abused: No    Sexually Abused: No   Housing Stability:     Unable to Pay for Housing in the Last Year: Not on file    Number of [place] in the Last Year: Not on file    Unstable Housing in the Last Year: Not on file       Family History   Problem Relation Age of Onset    Diabetes Mother     Heart Disease Father     Diabetes Father     Diabetes Maternal Grandmother     Diabetes Maternal Grandfather     Diabetes Paternal Grandmother     Diabetes Paternal Grandfather     Other Sister         sepsis in blood stream and pneumonia    Alcohol Abuse Brother     Drug Abuse Brother     Alcohol Abuse Brother     Drug Abuse Brother     Breast Cancer Neg Hx     Colon Cancer Neg Hx     Ovarian Cancer Neg Hx        Allergies   Allergen Reactions    Cat Hair Extract     Dust Mite Extract     Molds & Smuts     Seasonal        Vitals:    02/08/22 0811   BP: 117/83   Pulse: 93   SpO2: 100%       Current Outpatient Medications   Medication Sig Dispense Refill    buPROPion (WELLBUTRIN XL) 300 MG extended release tablet TAKE 1 TABLET BY MOUTH ONCE DAILY      hydrOXYzine (ATARAX) 25 MG tablet TAKE 1 TABLET BY MOUTH THREE TIMES DAILY AS NEEDED FOR ITCHING OR ANXIETY      medroxyPROGESTERone (DEPO-PROVERA) 150 MG/ML injection INJECT 1 ML INTRAMUSCULARLY ONCE EVERY 3 MONTHS 1 mL 0    buPROPion (WELLBUTRIN XL) 150 MG extended release tablet       QUEtiapine (SEROQUEL) 100 MG tablet       VILAZODONE HCL 40 MG Tablet Indications: pt states taking 60 mg       lurasidone (LATUDA) 60 MG TABS tablet Take 60 mg by mouth daily      OXcarbazepine (TRILEPTAL) 150 MG tablet TAKE 1 TABLET BY MOUTH IN THE MORNING FOR 2 WEEKS AND THEN 1 TAB TWICE DAILY      topiramate (TOPAMAX) 50 MG tablet TAKE 1 TABLET BY MOUTH EVERY DAY AT BEDTIME      ketoconazole (NIZORAL) 2 % shampoo Apply 3-4 times weekly to scalp, leave on for five minutes prior to washing off 120 mL 2    Elastic Bandages & Supports (LUMBAR BACK BRACE/SUPPORT PAD) MISC 1 Units by Does not apply route daily PNEUMATIC OFF LOADNG BACK BRACE 1 each 0    FLUoxetine (PROZAC) 20 MG tablet 20 mg 2 times daily       cariprazine hcl (VRAYLAR) 3 MG CAPS capsule Take by mouth daily       diclofenac (VOLTAREN) 75 MG EC tablet TAKE 1 TABLET BY MOUTH EVERY 12 HOURS AS NEEDED FOR BODY ACHES  4    baclofen (LIORESAL) 10 MG tablet Take 1 tablet by mouth daily 30 tablet 1    meloxicam (MOBIC) 15 MG tablet Take 1 tablet by mouth once daily 30 tablet 1    Elastic Bandages & Supports (LUMBAR BACK BRACE/SUPPORT PAD) MISC 1 Units by Does not apply route daily (Patient not taking: Reported on 9/21/2021) 1 each 0     No current facility-administered medications for this visit. Review of Systems   Constitutional: Negative. Negative for fever. Respiratory: Negative. Musculoskeletal: Positive for back pain. Neurological: Negative. Objective:  General Appearance:  Uncomfortable and in pain. Vital signs: (most recent): Blood pressure 117/83, pulse 93, height 5' 3\" (1.6 m), weight 265 lb (120.2 kg), SpO2 100 %, not currently breastfeeding. Vital signs are normal.  No fever. Output: Producing urine and producing stool. HEENT: Normal HEENT exam.    Lungs:  Normal effort and normal respiratory rate. She is not in respiratory distress. Heart: Normal rate. Extremities: Normal range of motion. There is no deformity. Neurological: Patient is alert and oriented to person, place and time. Normal strength. Patient has normal coordination. Pupils:  Pupils are equal, round, and reactive to light. Pupils are equal.   Skin:  Warm and dry. No rash or cyanosis.         Assessment & Plan     68-year-old female with past medical history significant for depression    Currently seeing a counselor and a psychologist at [address] on current medications    She is seen in our clinic for history of chronic low back pain  Pain is chronic onset more than 1 year ago located in the lower lumbar area across midline affect both side extends down both legs in front and back of the thigh up to the knee  No significant radiation below the knee  Pain is constant aggravated with routine activity Marked interfering with work quality of life and activity level  She has tried physical therapy without any relief  She has numerous spine interventions including epidural and facet block with limited benefit  Was recently evaluated by the neurosurgery [facility] at [facility]  Had recent diagnostic work-up with flexion-extension film and MRI lumbar spine that confirmed degenerative disc changes at L4-5 and L5-S1 level  She was not advised for any surgery  Neurosurgery have recommended for neuromodulation trial with a spinal cord stimulation    Today we discussed at length the risk and benefit associated with neuromodulation trial  Patient is eager to proceed  We will choose nephro for possible trial of both paresthesia and on paresthesia option  Information material is provided  Will obtain psychological evaluation once we have psychology clearance then we will submit for prior authorization    Patient is currently taking baclofen and Mobic  Reports no side effect  Find it somewhat helpful  Will provide refill    LUMBAR SPINE 6 OR MORE VWS    1. No fracture or malalignment. No instability on dynamic imaging. No spondylolysis. MRI LUMBAR SPINE WO CONTRAST  1/26/2022 3:28 PM    At L4-L5: Small annular tear centrally and broad-based central disc views disc bulge flattening and effacing the anterior thecal sac. Bilateral facet joint effusion and mild ligament of flavum hypertrophy. No significant canal stenosis. No significant neural foramen narrowing. At L5-S1: Broad-based central disc bulge without significant canal stenosis. However, there is significant epidural lipomatosis extending into the sacral neural canal with tapering of the thecal sac. No significant neural foramen narrowing. 1. Chronic bilateral low back pain without sciatica    2.  DDD (degenerative disc disease), lumbar        Orders Placed This Encounter   Procedures    External Referral To Psychology      Orders Placed This Encounter   Medications    baclofen (LIORESAL) 10 MG tablet     Sig: Take 1 tablet by mouth daily     Dispense:  30 tablet     Refill:  1    meloxicam (MOBIC) 15 MG tablet     Sig: Take 1 tablet by mouth once daily     Dispense:  30 tablet     Refill:  1            Electronically signed by Juanita Bullard MD on 2/8/2022 at 8:37 AM

## 2022-02-14 ENCOUNTER — TELEPHONE (OUTPATIENT)
Dept: FAMILY MEDICINE CLINIC | Age: 32
End: 2022-02-14

## 2022-02-14 NOTE — PROGRESS NOTES
SUBJECTIVE:    CHIEF COMPLAINT:              Chief Complaint   Patient presents with    Vaginal Discharge       HPI:   Nile Peralta presents todaycomplaining of vaginal discharge with an odor. She noticed that her urine smelled bad also. She reports this started approximately 3-4 week(s) ago. She describes the discharge as light. She reports it does have an  odor. She denies any associated pain. Nile Peralta denies any fever, chills, or dyspareunia. She has tried changing body wash. [facility] PREVENTIVE HEALTH SCREENING:   Date of last pap: 11/21   . Abnormal pap smear history: no  Date of last mammogram: na    GYNECOLOGIC HISTORY:   Menopause: no  No LMP recorded (lmp unknown). Patient has had an injection. Sexually active: Yes    New sexual partner: No     STD history: No    Birth control method :Depo    Review of Systems   Genitourinary: Positive for vaginal discharge. Vaginal odor  Urine odor               PHYSICAL EXAM:  Constitutional:   Blood pressure 121/83, pulse 100, height 5' 3\" (1.6 m), weight 271 lb 1.6 oz (123 kg), not currently breastfeeding. Wt Readings from Last 3 Encounters:   02/15/22 271 lb 1.6 oz (123 kg)   02/08/22 265 lb (120.2 kg)   02/07/22 269 lb 14.4 oz (122.4 kg)       General Appearance: This radha well-developed, well-nourished and well-groomed female    Skin:  Inspection of the skin revealed no rashes or lesions    Back:  Straight with no CVA tenderness present    Abdomen: The abdomen issoft and non-tender. There was no guarding, rebound or rigidity. The bladder was without fullness or tenderness. No hernias were appreciated. Pelvic: The external genitalia has a normal appearance without masses or lesions. BUS is normal.  The vagina is pink and well rugated. The cervix is without lesions with no CMT. The uterus is normal size, shape andconsistency. The adnexa are without masses or tenderness.   Rectum is normal.    Psychiatric:  Alert, oriented to time, place, person andsituation. There are no mood or affect changes. ASSESSMENT/PLAN:  .1. Vaginal discharge    - Vaginitis DNA Probe; Future    2. Vaginal odor      3. Abnormal urine odor  - culture sent              Patient was seen with total face to face time of 20 minutes. More than 50% of this visit was on counseling and education regarding her   1. Vaginal discharge    2. Vaginal odor    3. Abnormal urine odor     and her options. She was alsocounseled on her preventative health maintenance recommendations and follow-up.

## 2022-02-15 ENCOUNTER — HOSPITAL ENCOUNTER (OUTPATIENT)
Age: 32
Setting detail: SPECIMEN
Discharge: HOME | End: 2022-02-15

## 2022-02-15 ENCOUNTER — OFFICE VISIT (OUTPATIENT)
Dept: OBGYN CLINIC | Age: 32
End: 2022-02-15
Payer: MEDICARE

## 2022-02-15 VITALS
WEIGHT: 271.1 LBS | BODY MASS INDEX: 48.04 KG/M2 | HEART RATE: 100 BPM | HEIGHT: 63 IN | SYSTOLIC BLOOD PRESSURE: 121 MMHG | DIASTOLIC BLOOD PRESSURE: 83 MMHG

## 2022-02-15 DIAGNOSIS — R82.90 ABNORMAL URINE ODOR: ICD-10-CM

## 2022-02-15 DIAGNOSIS — N89.8 VAGINAL DISCHARGE: Primary | ICD-10-CM

## 2022-02-15 DIAGNOSIS — N89.8 VAGINAL ODOR: ICD-10-CM

## 2022-02-15 PROCEDURE — G8484 FLU IMMUNIZE NO ADMIN: HCPCS | Performed by: ADVANCED PRACTICE MIDWIFE

## 2022-02-15 PROCEDURE — 1036F TOBACCO NON-USER: CPT | Performed by: ADVANCED PRACTICE MIDWIFE

## 2022-02-15 PROCEDURE — 99213 OFFICE O/P EST LOW 20 MIN: CPT | Performed by: ADVANCED PRACTICE MIDWIFE

## 2022-02-15 PROCEDURE — G8427 DOCREV CUR MEDS BY ELIG CLIN: HCPCS | Performed by: ADVANCED PRACTICE MIDWIFE

## 2022-02-15 PROCEDURE — G8417 CALC BMI ABV UP PARAM F/U: HCPCS | Performed by: ADVANCED PRACTICE MIDWIFE

## 2022-02-16 DIAGNOSIS — R82.90 ABNORMAL URINE ODOR: ICD-10-CM

## 2022-02-16 DIAGNOSIS — N89.8 VAGINAL DISCHARGE: ICD-10-CM

## 2022-02-16 LAB
CANDIDA SPECIES, DNA PROBE: NEGATIVE
GARDNERELLA VAGINALIS, DNA PROBE: POSITIVE
SOURCE: ABNORMAL
TRICHOMONAS VAGINALIS DNA: NEGATIVE

## 2022-02-16 RX ORDER — METRONIDAZOLE 500 MG/1
500 TABLET ORAL 2 TIMES DAILY
Qty: 14 TABLET | Refills: 0 | Status: SHIPPED | OUTPATIENT
Start: 2022-02-16 | End: 2022-02-23

## 2022-02-17 ENCOUNTER — TELEPHONE (OUTPATIENT)
Dept: PAIN MANAGEMENT | Age: 32
End: 2022-02-17

## 2022-02-17 LAB
CULTURE: NORMAL
Lab: NORMAL
SPECIMEN DESCRIPTION: NORMAL

## 2022-02-17 NOTE — TELEPHONE ENCOUNTER
Patient will like to know if she can make an appointment to come to see Dr. Johnnie Tobias between 2/17-2/28 to discuss psych evaluation. Please contact the patient at [phone] to advise. Thank you.

## 2022-02-22 ENCOUNTER — OFFICE VISIT (OUTPATIENT)
Dept: PAIN MANAGEMENT | Age: 32
End: 2022-02-22
Payer: MEDICARE

## 2022-02-22 VITALS
OXYGEN SATURATION: 98 % | HEIGHT: 63 IN | WEIGHT: 271 LBS | BODY MASS INDEX: 48.02 KG/M2 | HEART RATE: 92 BPM | SYSTOLIC BLOOD PRESSURE: 104 MMHG | DIASTOLIC BLOOD PRESSURE: 71 MMHG

## 2022-02-22 DIAGNOSIS — G89.29 CHRONIC BILATERAL LOW BACK PAIN WITHOUT SCIATICA: Primary | ICD-10-CM

## 2022-02-22 DIAGNOSIS — M51.36 DDD (DEGENERATIVE DISC DISEASE), LUMBAR: ICD-10-CM

## 2022-02-22 DIAGNOSIS — M54.50 CHRONIC BILATERAL LOW BACK PAIN WITHOUT SCIATICA: Primary | ICD-10-CM

## 2022-02-22 PROCEDURE — G8484 FLU IMMUNIZE NO ADMIN: HCPCS | Performed by: ANESTHESIOLOGY

## 2022-02-22 PROCEDURE — G8417 CALC BMI ABV UP PARAM F/U: HCPCS | Performed by: ANESTHESIOLOGY

## 2022-02-22 PROCEDURE — 99213 OFFICE O/P EST LOW 20 MIN: CPT | Performed by: ANESTHESIOLOGY

## 2022-02-22 PROCEDURE — 1036F TOBACCO NON-USER: CPT | Performed by: ANESTHESIOLOGY

## 2022-02-22 PROCEDURE — G8427 DOCREV CUR MEDS BY ELIG CLIN: HCPCS | Performed by: ANESTHESIOLOGY

## 2022-02-22 ASSESSMENT — ENCOUNTER SYMPTOMS: BACK PAIN: 1

## 2022-02-22 NOTE — PROGRESS NOTES
The patient is a 36 y. o. Non- / non  female. Chief Complaint   Patient presents with    Back Pain     bilateral low back pain - scs trial set up         HPI   Severe back pain located in the lower lumbar area intensity 8/10 aggravates with bending lifting twisting turning interfering with work sleep and quality of life  Pain is constant  Symptoms are progressively worsening  Was recently evaluated by the spine surgeon at [facility] and is not advised for any surgery  Had recent diagnostic work-up with flexion-extension film and MRI lumbar spine  Here to discuss further treatment option  Denies any loss of bladder or bowel control     Patient is here for F/U after seeing spine specialist. Had new MRI & Xrays don  Pain level is a 8   Location is in back   Pain is constant pain   Walking bending sitting standing lifting (everything)  Nothing alleviates the pain    She is accompanied today by her significant other  All questions were answered to the patient's and the family satisfaction    Patient is here today for bilateral low back pain. She states that her pain is a 7/10. She states that walking, bending and twisting make pain worse. She is here for scs trail set up.         Past Medical History:   Diagnosis Date    ADHD     Anxiety     Back pain     Brain injury ([address]     Depression     Hearing loss     Learning disability     Migraine headache     Obese         Past Surgical History:   Procedure Laterality Date    OTHER SURGICAL HISTORY Bilateral 03/25/2021    lumbar DENIZ    PAIN MANAGEMENT PROCEDURE Bilateral 12/14/2020    EPIDURAL STEROID INJECTION BILATERAL L5 performed by Shandra Goode MD at [address] [place] [address] Bilateral 1/4/2021    EPIDURAL STEROID INJECTION BILATERAL L5 performed by Shandra Goode MD at [address] [place] [address] Bilateral 3/25/2021    LUMBAR FACET STEROID INJECTION BILATERAL L4 / 5 performed by Shandra Goode MD at [address] [place] [address]  SKIN BIOPSY  04/15/2021    MID BACK CYST LESION BIOPSY EXCISION    SKIN BIOPSY N/A 4/15/2021    MID BACK CYST LESION BIOPSY EXCISION performed by Naa Shoemaker DO at [address] [place] [address]         Social History     Socioeconomic History    Marital status: Single     Spouse name: Not on file    Number of children: Not on file    Years of education: Not on file    Highest education level: Not on file   Occupational History    Not on file   Tobacco Use    Smoking status: Former Smoker     Types: Cigarettes     Quit date: 2016     Years since quittin.1    Smokeless tobacco: Never Used   Vaping Use    Vaping Use: Former   Substance and Sexual Activity    Alcohol use: Never     Alcohol/week: 0.0 standard drinks    Drug use: No    Sexual activity: Yes     Partners: Male   Other Topics Concern    Not on file   Social History Narrative    Not on file     Social Determinants of Health     Financial Resource Strain: Low Risk     Difficulty of Paying Living Expenses: Not hard at all   Food Insecurity: No Food Insecurity    Worried About [address] in the Last Year: Never true    [address] in the Last Year: Never true   Transportation Needs:     Lack of Transportation (Medical): Not on file    Lack of Transportation (Non-Medical):  Not on file   Physical Activity:     Days of Exercise per Week: Not on file    Minutes of Exercise per Session: Not on file   Stress:     Feeling of Stress : Not on file   Social Connections:     Frequency of Communication with Friends and Family: Not on file    Frequency of Social Gatherings with Friends and Family: Not on file    Attends Protestant Services: Not on file    Active Member of Clubs or Organizations: Not on file    Attends Club or Organization Meetings: Not on file    Marital Status: Not on file   Intimate Partner Violence: Not At Risk    Fear of Current or Ex-Partner: No    Emotionally Abused: No    Physically Abused: No    Sexually Abused: No   Housing Stability:     Unable to Pay for Housing in the Last Year: Not on file    Number of Places Lived in the Last Year: Not on file    Unstable Housing in the Last Year: Not on file       Family History   Problem Relation Age of Onset    Diabetes Mother     Heart Disease Father     Diabetes Father     Diabetes Maternal Grandmother     Diabetes Maternal Grandfather     Diabetes Paternal Grandmother     Diabetes Paternal Grandfather     Other Sister         sepsis in blood stream and pneumonia    Alcohol Abuse Brother     Drug Abuse Brother     Alcohol Abuse Brother     Drug Abuse Brother     Breast Cancer Neg Hx     Colon Cancer Neg Hx     Ovarian Cancer Neg Hx        Allergies   Allergen Reactions    Cat Hair Extract     Dust Mite Extract     Molds & Smuts     Seasonal        Vitals:    02/22/22 0924   BP: 104/71   Pulse: 92   SpO2: 98%       Current Outpatient Medications   Medication Sig Dispense Refill    metroNIDAZOLE (FLAGYL) 500 MG tablet Take 1 tablet by mouth 2 times daily for 7 days 14 tablet 0    buPROPion (WELLBUTRIN XL) 300 MG extended release tablet TAKE 1 TABLET BY MOUTH ONCE DAILY      hydrOXYzine (ATARAX) 25 MG tablet TAKE 1 TABLET BY MOUTH THREE TIMES DAILY AS NEEDED FOR ITCHING OR ANXIETY      baclofen (LIORESAL) 10 MG tablet Take 1 tablet by mouth daily 30 tablet 1    meloxicam (MOBIC) 15 MG tablet Take 1 tablet by mouth once daily 30 tablet 1    medroxyPROGESTERone (DEPO-PROVERA) 150 MG/ML injection INJECT 1 ML INTRAMUSCULARLY ONCE EVERY 3 MONTHS 1 mL 0    buPROPion (WELLBUTRIN XL) 150 MG extended release tablet       QUEtiapine (SEROQUEL) 100 MG tablet       VILAZODONE HCL 40 MG Tablet Indications: pt states taking 60 mg       lurasidone (LATUDA) 60 MG TABS tablet Take 60 mg by mouth daily      OXcarbazepine (TRILEPTAL) 150 MG tablet TAKE 1 TABLET BY MOUTH IN THE MORNING FOR 2 WEEKS AND THEN 1 TAB TWICE DAILY      topiramate (TOPAMAX) 50 MG tablet TAKE 1 TABLET BY MOUTH EVERY DAY AT BEDTIME      ketoconazole (NIZORAL) 2 % shampoo Apply 3-4 times weekly to scalp, leave on for five minutes prior to washing off 120 mL 2    Elastic Bandages & Supports (LUMBAR BACK BRACE/SUPPORT PAD) MISC 1 Units by Does not apply route daily PNEUMATIC OFF LOADNG BACK BRACE 1 each 0    FLUoxetine (PROZAC) 20 MG tablet 20 mg 2 times daily       cariprazine hcl (VRAYLAR) 3 MG CAPS capsule Take by mouth daily       diclofenac (VOLTAREN) 75 MG EC tablet TAKE 1 TABLET BY MOUTH EVERY 12 HOURS AS NEEDED FOR BODY ACHES  4     No current facility-administered medications for this visit. Review of Systems   Constitutional: Negative for fever. Musculoskeletal: Positive for back pain. Objective:  General Appearance: In no acute distress, in pain and uncomfortable. Vital signs: (most recent): Blood pressure 104/71, pulse 92, height 5' 3\" (1.6 m), weight 271 lb (122.9 kg), SpO2 98 %, not currently breastfeeding. Vital signs are normal.  No fever. Output: Producing urine and producing stool. Lungs:  Normal effort. She is not in respiratory distress. Heart: Normal rate. Neurological: Patient is alert and oriented to person, place and time.       Assessment & Plan     22-year-old woman with past medical history significant for depression and  Taking several psych medication  Seen in our clinic history of chronic back pain  Pain is predominantly axial located in the lower lumbar area across midline is a constant aching pain that aggravates with routine activity particularly lifting bending twisting turning  Reports some extension of the pain down left leg over gluteal region and posterior thigh  No significant radiation below the knee level  Patient tried therapy 2 years ago  Had recent MRI that showed L4 and L5 level degenerative disc changes  She received a lumbar epidural injection and facet RFA  Today report no significant improvement in pain overall 15 to 20% total improvement  Pain is interfering with quality of life  Recommended neuromodulation spinal cord stimulation trial  She is very interested in the procedure  Patient have completed psychological evaluation  We will schedule her for the spinal cord stimulation trial    1. Chronic bilateral low back pain without sciatica    2. DDD (degenerative disc disease), lumbar        Orders Placed This Encounter   Procedures    MN PERCUT IMPLNT NEUROELECT,EPIDURAL      No orders of the defined types were placed in this encounter.            Electronically signed by Sonia Guillory MD on 2/22/2022 at 10:51 AM

## 2022-03-14 ENCOUNTER — HOSPITAL ENCOUNTER (OUTPATIENT)
Dept: PAIN MANAGEMENT | Facility: CLINIC | Age: 32
Discharge: HOME | End: 2022-03-14
Payer: MEDICARE

## 2022-03-14 VITALS
HEIGHT: 63 IN | TEMPERATURE: 97.8 F | WEIGHT: 271 LBS | OXYGEN SATURATION: 98 % | DIASTOLIC BLOOD PRESSURE: 69 MMHG | RESPIRATION RATE: 14 BRPM | BODY MASS INDEX: 48.02 KG/M2 | HEART RATE: 67 BPM | SYSTOLIC BLOOD PRESSURE: 124 MMHG

## 2022-03-14 DIAGNOSIS — M51.36 DDD (DEGENERATIVE DISC DISEASE), LUMBAR: Primary | ICD-10-CM

## 2022-03-14 DIAGNOSIS — G89.29 CHRONIC BILATERAL LOW BACK PAIN WITHOUT SCIATICA: ICD-10-CM

## 2022-03-14 DIAGNOSIS — M54.50 CHRONIC BILATERAL LOW BACK PAIN WITHOUT SCIATICA: ICD-10-CM

## 2022-03-14 DIAGNOSIS — R52 PAIN MANAGEMENT: ICD-10-CM

## 2022-03-14 PROBLEM — M54.42 CHRONIC BILATERAL LOW BACK PAIN WITH BILATERAL SCIATICA: Status: ACTIVE | Noted: 2019-12-04

## 2022-03-14 PROBLEM — M54.41 CHRONIC BILATERAL LOW BACK PAIN WITH BILATERAL SCIATICA: Status: ACTIVE | Noted: 2019-12-04

## 2022-03-14 LAB — HCG, PREGNANCY URINE (POC): NEGATIVE

## 2022-03-14 PROCEDURE — 2580000003 HC RX 258: Performed by: ANESTHESIOLOGY

## 2022-03-14 PROCEDURE — 2500000003 HC RX 250 WO HCPCS: Performed by: ANESTHESIOLOGY

## 2022-03-14 PROCEDURE — 6360000002 HC RX W HCPCS: Performed by: ANESTHESIOLOGY

## 2022-03-14 PROCEDURE — 63650 IMPLANT NEUROELECTRODES: CPT | Performed by: ANESTHESIOLOGY

## 2022-03-14 PROCEDURE — C1778 LEAD, NEUROSTIMULATOR: HCPCS

## 2022-03-14 PROCEDURE — 81025 URINE PREGNANCY TEST: CPT

## 2022-03-14 PROCEDURE — 63650 IMPLANT NEUROELECTRODES: CPT

## 2022-03-14 PROCEDURE — 99153 MOD SED SAME PHYS/QHP EA: CPT | Performed by: ANESTHESIOLOGY

## 2022-03-14 PROCEDURE — 99152 MOD SED SAME PHYS/QHP 5/>YRS: CPT | Performed by: ANESTHESIOLOGY

## 2022-03-14 RX ORDER — MIDAZOLAM HYDROCHLORIDE 2 MG/2ML
INJECTION, SOLUTION INTRAMUSCULAR; INTRAVENOUS
Status: COMPLETED | OUTPATIENT
Start: 2022-03-14 | End: 2022-03-14

## 2022-03-14 RX ORDER — LIDOCAINE HYDROCHLORIDE 10 MG/ML
INJECTION, SOLUTION EPIDURAL; INFILTRATION; INTRACAUDAL; PERINEURAL
Status: COMPLETED | OUTPATIENT
Start: 2022-03-14 | End: 2022-03-14

## 2022-03-14 RX ORDER — FENTANYL CITRATE 50 UG/ML
INJECTION, SOLUTION INTRAMUSCULAR; INTRAVENOUS
Status: COMPLETED | OUTPATIENT
Start: 2022-03-14 | End: 2022-03-14

## 2022-03-14 RX ORDER — SODIUM CHLORIDE 9 MG/ML
INJECTION, SOLUTION INTRAVENOUS CONTINUOUS PRN
Status: COMPLETED | OUTPATIENT
Start: 2022-03-14 | End: 2022-03-14

## 2022-03-14 RX ORDER — BUPIVACAINE HYDROCHLORIDE 5 MG/ML
INJECTION, SOLUTION EPIDURAL; INTRACAUDAL
Status: COMPLETED | OUTPATIENT
Start: 2022-03-14 | End: 2022-03-14

## 2022-03-14 RX ADMIN — MIDAZOLAM HYDROCHLORIDE 2 MG: 1 INJECTION, SOLUTION INTRAMUSCULAR; INTRAVENOUS at 10:48

## 2022-03-14 RX ADMIN — BUPIVACAINE HYDROCHLORIDE 5 ML: 5 INJECTION, SOLUTION EPIDURAL; INTRACAUDAL; PERINEURAL at 10:53

## 2022-03-14 RX ADMIN — FENTANYL CITRATE 50 MCG: 50 INJECTION INTRAMUSCULAR; INTRAVENOUS at 10:48

## 2022-03-14 RX ADMIN — FENTANYL CITRATE 50 MCG: 50 INJECTION INTRAMUSCULAR; INTRAVENOUS at 10:59

## 2022-03-14 RX ADMIN — LIDOCAINE HYDROCHLORIDE 5 ML: 10 INJECTION, SOLUTION EPIDURAL; INFILTRATION; INTRACAUDAL at 10:53

## 2022-03-14 RX ADMIN — SODIUM CHLORIDE 500 ML: 0.9 INJECTION, SOLUTION INTRAVENOUS at 10:44

## 2022-03-14 RX ADMIN — CEFAZOLIN SODIUM 2000 MG: 500 INJECTION, POWDER, FOR SOLUTION INTRAMUSCULAR; INTRAVENOUS at 10:49

## 2022-03-14 ASSESSMENT — PAIN DESCRIPTION - PAIN TYPE: TYPE: CHRONIC PAIN

## 2022-03-14 ASSESSMENT — PAIN DESCRIPTION - ONSET: ONSET: ON-GOING

## 2022-03-14 ASSESSMENT — PAIN DESCRIPTION - DESCRIPTORS
DESCRIPTORS: BURNING;STABBING
DESCRIPTORS: PRESSURE

## 2022-03-14 ASSESSMENT — PAIN DESCRIPTION - PROGRESSION: CLINICAL_PROGRESSION: GRADUALLY WORSENING

## 2022-03-14 ASSESSMENT — PAIN - FUNCTIONAL ASSESSMENT
PAIN_FUNCTIONAL_ASSESSMENT: PREVENTS OR INTERFERES WITH ALL ACTIVE AND SOME PASSIVE ACTIVITIES
PAIN_FUNCTIONAL_ASSESSMENT: 0-10

## 2022-03-14 ASSESSMENT — PAIN DESCRIPTION - FREQUENCY: FREQUENCY: INTERMITTENT

## 2022-03-14 ASSESSMENT — PAIN DESCRIPTION - LOCATION: LOCATION: BACK

## 2022-03-14 ASSESSMENT — PAIN DESCRIPTION - ORIENTATION: ORIENTATION: RIGHT;LEFT;LOWER;MID

## 2022-03-14 ASSESSMENT — PAIN SCALES - GENERAL: PAINLEVEL_OUTOF10: 8

## 2022-03-14 NOTE — OP NOTE
PREOPERATIVE DIAGNOSES:   1. DDD (degenerative disc disease), lumbar    2. Chronic bilateral low back pain without sciatica        POSTOPERATIVE DIAGNOSES:   1. DDD (degenerative disc disease), lumbar    2. Chronic bilateral low back pain without sciatica          PROCEDURE PERFORMED:  1. Placement of Thoracic Dorsal epidural leads to T8 / 9 / 10via percuatneous lumbar entry to L1/2 level x 2   2. Complex programming of the stimulator device with Tonic paresthesia and Non paresthesia parameters. ANESTHESIA:  IV sedation with versed and fentanyl    BLOOD LOSS:  Minimal    INTRAVENOUS FLUID:  Recorded in the anesthesia chart. ANTIBIOTICS:  2 GM ANCEF    OPERATIVE NOTE:    The patient was seen in the preoperative area. Chart was reviewed. Informed  consent was obtained. The patient was taken to the procedure room and was placed in prone position. All pressure points were padded. The area over the thoracic and lumbar spine was prepped with Betadine and DuraPrep. Complete sterile technique for prep and drape was utilized. A timeout was completed prior to the start of the procedure. Antibiotic was administered prior to the start of the procedure. AP view was taken and L3 pedicles were marked on each side. Then, a 14-gauge Tuohy  epidural needle was advanced from right  pedicle in a paramedian fashion to cotact the lamina at L1/2 level. Epidural space entry was at L1/2 Interspace. Epidural space was identified with loss of resistance to air. Position was confirmed  in lateral view. Then, a 8 CONTACT NEVRO SCS leadwas advanced with live fluoroscopy in AP view to the superior endplate of T8 vertebral body. Position was confirmed in lateral view to be in the dorsal epidural space. Similarly, a 2nd lead was placed from the left side  into the epidural space  just to the left of the midline using the same technique.  Again, position was confirmed in lateral view fluoroscopy to be in the dorsal epidural space. The leads were then tested for impedance which was wnl. Stimulator testing was then performed and leads positions were optimized to obtain optimal paresthesia coverage over the pain areas. The stylets and needles were then removed with live fluoroscopy. Leads were secured  with chlorhexidine Tegaderm and coverall. The patient was then turned into supine position and was taken to the recovery area   where she was monitored for appropriate period of time before being discharged home   in stable condition. Detailed discharge instructions were provided to the patient in the PACU to  avoid any bending, twisting, turning and any exaggerated spine movements. She  is instructed to keep the area dry. We will plan to follow up with her on phone to assess trial response        SEDATION NOTE:    ASA CLASSIFICATION  2  MP   CLASSIFICATION  2    Moderate intravenous conscious sedation was supervised by Dr. Judith Caldera  The patient was independently monitored by a Registered Nurse assigned to the Procedure Room  Monitoring included automated blood pressure, continuous EKG, Capnography and continuous pulse oximetry. The detailed Conscious Record is permanently stored [facility] the [facility] .      The following is the conscious sedation record;  Start Time:  1044  End times:  1112  Duration:  28 minutes  MEDS GIVEN 2 MG VERSED  MC[facility] FENTANYL

## 2022-03-14 NOTE — H&P
UPDATE:  Office visit pain clinic in [facility] with all required elements of H&P dated 02/22/2022  Patient seen preop, chart reviewed,   No changes in medical history and health assessment since last evaluation. PE:  AAO x 3, in NAD, VSS,. Resp: breathing on RA, no respiratory distress  Cardiac: rate normal    Risk / Benefits explained to patient, patient agree to proceed with plan.   ASA 2  MP 2

## 2022-03-21 ENCOUNTER — OFFICE VISIT (OUTPATIENT)
Dept: PAIN MANAGEMENT | Age: 32
End: 2022-03-21
Payer: MEDICARE

## 2022-03-21 VITALS — HEART RATE: 99 BPM | HEIGHT: 63 IN | BODY MASS INDEX: 48.37 KG/M2 | WEIGHT: 273 LBS | OXYGEN SATURATION: 98 %

## 2022-03-21 DIAGNOSIS — M47.816 LUMBAR FACET JOINT SYNDROME: ICD-10-CM

## 2022-03-21 DIAGNOSIS — M51.26 LUMBAR DISC HERNIATION: Primary | Chronic | ICD-10-CM

## 2022-03-21 DIAGNOSIS — M54.41 CHRONIC BILATERAL LOW BACK PAIN WITH BILATERAL SCIATICA: ICD-10-CM

## 2022-03-21 DIAGNOSIS — G89.29 CHRONIC BILATERAL LOW BACK PAIN WITH BILATERAL SCIATICA: ICD-10-CM

## 2022-03-21 DIAGNOSIS — M54.42 CHRONIC BILATERAL LOW BACK PAIN WITH BILATERAL SCIATICA: ICD-10-CM

## 2022-03-21 DIAGNOSIS — Z96.89 SPINAL CORD STIMULATOR STATUS: ICD-10-CM

## 2022-03-21 DIAGNOSIS — M54.16 LEFT LUMBAR RADICULITIS: Chronic | ICD-10-CM

## 2022-03-21 PROCEDURE — G8427 DOCREV CUR MEDS BY ELIG CLIN: HCPCS | Performed by: NURSE PRACTITIONER

## 2022-03-21 PROCEDURE — G8417 CALC BMI ABV UP PARAM F/U: HCPCS | Performed by: NURSE PRACTITIONER

## 2022-03-21 PROCEDURE — 99214 OFFICE O/P EST MOD 30 MIN: CPT | Performed by: NURSE PRACTITIONER

## 2022-03-21 PROCEDURE — 1036F TOBACCO NON-USER: CPT | Performed by: NURSE PRACTITIONER

## 2022-03-21 PROCEDURE — G8484 FLU IMMUNIZE NO ADMIN: HCPCS | Performed by: NURSE PRACTITIONER

## 2022-03-21 RX ORDER — MELOXICAM 15 MG/1
TABLET ORAL
Qty: 30 TABLET | Refills: 1 | Status: CANCELLED | OUTPATIENT
Start: 2022-03-21

## 2022-03-21 ASSESSMENT — ENCOUNTER SYMPTOMS
CONSTIPATION: 0
SHORTNESS OF BREATH: 0
BACK PAIN: 1
COUGH: 0

## 2022-03-21 NOTE — PROGRESS NOTES
Chief Complaint   Patient presents with    Follow Up After Procedure    Medication Refill     mobic        PMH     Pt c/o severe back pain located in the lower lumbar for over 5 years. No known injury or surgery to the area. Reported occ radiation down to hips without numbness/tingling  Pt has tried  LESI which helped her radicular pain and RFA with only 15-20% benefit   Was evaluated by the spine surgeon at [address] and is not advised for any surgery  Had recent diagnostic work-up with flexion-extension film and MRI lumbar spine Minimal lower lumbar spondylosis mainly seen at L4-5 and L5-S1 mild canal stenosis mainly at L5-S1 level   Here today for f/u after Nevro SCS trial and reports over the past week 85-90% relief and able to take a couple walks pain free. Questions answered re permament implant and pt would like think about it and will call our office    Patient started spinal cord stimulator trial a week ago  Today here for a follow-up and for lead pull     Patient report near complete 85-90% improvement in her pain with the use of a spinal cord stimulator  She is very happy and satisfied with the outcome and will think about implant and call office when ready to schedule   Sgee denies any side effects  No history of fever or chills  Reports significant improvement in the quality of life - able to take a few walks     SCS leads pulled without complication. Skin dry and intact without sign of infection. Bacitracin and band aids   Pt able to ambulate from room without difficulty. Pt tolerated procedure and is neurologically intact      HPI:   Back Pain  This is a chronic problem. The current episode started more than 1 year ago. The problem occurs constantly. The problem is unchanged. The pain is present in the lumbar spine. The quality of the pain is described as aching. The pain does not radiate. The pain is at a severity of 4/10. The pain is mild. The pain is worse during the day. The symptoms are aggravated by standing (walking ). Pertinent negatives include no chest pain, fever, numbness or paresthesias. Risk factors include obesity, poor posture, sedentary lifestyle and lack of exercise. She has tried home exercises for the symptoms. The treatment provided mild relief. Dx:  S/P: Thoracic Dorsal epidural leads to T8 / 9 / 10via percuatneous lumbar entry to L1/2 level x 2      Outcome   Any improvement of activity?   Yes   Any side effects (appetite,leg cramping,facial fleshing):no   Increase of pain:  No  Pain score Today:  4  % of pain relief:75%  Pain diary (medial branch block): No    Lab Results   Component Value Date    LABA1C 6.1 (H) 09/28/2020     Lab Results   Component Value Date     09/28/2020                Past Medical History:   Diagnosis Date    ADHD     Anxiety     Back pain     Brain injury ([address]     Depression     Hearing loss     Learning disability     Migraine headache     Obese        Past Surgical History:   Procedure Laterality Date    OTHER SURGICAL HISTORY Bilateral 03/25/2021    lumbar DENIZ    PAIN MANAGEMENT PROCEDURE Bilateral 12/14/2020    EPIDURAL STEROID INJECTION BILATERAL L5 performed by Soumya Toscano MD at [place] [address] Bilateral 1/4/2021    EPIDURAL STEROID INJECTION BILATERAL L5 performed by Soumya Toscano MD at [place] [address] Bilateral 3/25/2021    LUMBAR FACET STEROID INJECTION BILATERAL L4 / 5 performed by [facility] at [facility] Albumatic  04/15/2021    MID BACK CYST LESION BIOPSY EXCISION    SKIN BIOPSY N/A 4/15/2021    MID BACK CYST LESION BIOPSY EXCISION performed by Dottie Wren DO at [facility] 8 WISDOM TOOTH EXTRACTION         Allergies   Allergen Reactions    Cat Hair Extract     Dust Mite Extract     Molds & Smuts     Seasonal          Current Outpatient Medications:     buPROPion (WELLBUTRIN XL) 300 MG extended release tablet, TAKE 1 TABLET BY MOUTH ONCE DAILY, Disp: , Rfl:     hydrOXYzine (ATARAX) 25 MG tablet, TAKE 1 TABLET BY MOUTH THREE TIMES DAILY AS NEEDED FOR ITCHING OR ANXIETY, Disp: , Rfl:     meloxicam (MOBIC) 15 MG tablet, Take 1 tablet by mouth once daily, Disp: 30 tablet, Rfl: 1    medroxyPROGESTERone (DEPO-PROVERA) 150 MG/ML injection, INJECT 1 ML INTRAMUSCULARLY ONCE EVERY 3 MONTHS, Disp: 1 mL, Rfl: 0    buPROPion (WELLBUTRIN XL) 150 MG extended release tablet, , Disp: , Rfl:     QUEtiapine (SEROQUEL) 100 MG tablet, , Disp: , Rfl:     VILAZODONE HCL 40 MG Tablet, Indications: pt states taking 60 mg , Disp: , Rfl:     lurasidone (LATUDA) 60 MG TABS tablet, Take 60 mg by mouth daily, Disp: , Rfl:     OXcarbazepine (TRILEPTAL) 150 MG tablet, TAKE 1 TABLET BY MOUTH IN THE MORNING FOR 2 WEEKS AND THEN 1 TAB TWICE DAILY, Disp: , Rfl:     topiramate (TOPAMAX) 50 MG tablet, TAKE 1 TABLET BY MOUTH EVERY DAY AT BEDTIME, Disp: , Rfl:     ketoconazole (NIZORAL) 2 % shampoo, Apply 3-4 times weekly to scalp, leave on for five minutes prior to washing off, Disp: 120 mL, Rfl: 2    Elastic Bandages & Supports (LUMBAR BACK BRACE/SUPPORT PAD) MISC, 1 Units by Does not apply route daily PNEUMATIC OFF LOADNG BACK BRACE, Disp: 1 each, Rfl: 0    FLUoxetine (PROZAC) 20 MG tablet, 20 mg 2 times daily , Disp: , Rfl:     cariprazine hcl (VRAYLAR) 3 MG CAPS capsule, Take by mouth daily , Disp: , Rfl:     diclofenac (VOLTAREN) 75 MG EC tablet, TAKE 1 TABLET BY MOUTH EVERY 12 HOURS AS NEEDED FOR BODY ACHES, Disp: , Rfl: 4    Family History   Problem Relation Age of Onset    Diabetes Mother     Heart Disease Father     Diabetes Father     Diabetes Maternal Grandmother     Diabetes Maternal Grandfather     Diabetes Paternal Grandmother     Diabetes Paternal Grandfather     Other Sister         sepsis in blood stream and pneumonia    Alcohol Abuse Brother     Drug Abuse Brother     Alcohol Abuse Brother     Drug Abuse Brother     Breast Cancer Neg Hx     Colon Cancer Neg Hx     Ovarian Cancer Neg Hx        Social History     Socioeconomic History    Marital status: Single     Spouse name: Not on file    Number of children: Not on file    Years of education: Not on file    Highest education level: Not on file   Occupational History    Not on file   Tobacco Use    Smoking status: Former Smoker     Types: Cigarettes     Quit date: 2016     Years since quittin.2    Smokeless tobacco: Never Used   Vaping Use    Vaping Use: Former   Substance and Sexual Activity    Alcohol use: Never     Alcohol/week: 0.0 standard drinks    Drug use: No    Sexual activity: Yes     Partners: Male   Other Topics Concern    Not on file   Social History Narrative    Not on file     Social Determinants of Health     Financial Resource Strain: Low Risk     Difficulty of Paying Living Expenses: Not hard at all   Food Insecurity: No Food Insecurity    Worried About [address] in the Last Year: Never true    [facility] in the Last Year: Never true   Transportation Needs:     Lack of Transportation (Medical): Not on file    Lack of Transportation (Non-Medical):  Not on file   Physical Activity:     Days of Exercise per Week: Not on file    Minutes of Exercise per Session: Not on file   Stress:     Feeling of Stress : Not on file   Social Connections:     Frequency of Communication with Friends and Family: Not on file    Frequency of Social Gatherings with Friends and Family: Not on file    Attends [employer] Services: Not on file    Active Member of Clubs or Organizations: Not on file    Attends Club or Organization Meetings: Not on file    Marital Status: Not on file   Intimate Partner Violence: Not At Risk    Fear of Current or Ex-Partner: No    Emotionally Abused: No    Physically Abused: No    Sexually Abused: No   Housing Stability:     Unable to Pay for Housing in the Last Year: Not on file    Number of Places Lived in the Last Year: Not on file    Unstable Housing in the Last Year: Not on file       Review of Systems:  Review of Systems   Constitutional: Negative for chills and fever. Cardiovascular: Negative for chest pain. Respiratory: Negative for cough and shortness of breath. Musculoskeletal: Positive for back pain. Gastrointestinal: Negative for constipation. Neurological: Negative for numbness and paresthesias. Physical Exam:  Pulse 99   Ht 5' 3\" (1.6 m)   Wt 273 lb (123.8 kg)   SpO2 98%   BMI 48.36 kg/m²     Physical Exam  Cardiovascular:      Rate and Rhythm: Normal rate. Pulmonary:      Effort: Pulmonary effort is normal.   Musculoskeletal:         General: Normal range of motion. Skin:     General: Skin is warm and dry. Neurological:      Mental Status: She is alert and oriented to person, place, and time.              Assessment:  Problem List Items Addressed This Visit     Lumbar disc herniation - Primary (Chronic)    Left lumbar radiculitis (Chronic)    Chronic bilateral low back pain with bilateral sciatica    Lumbar facet joint syndrome    Spinal cord stimulator status             Treatment Plan:  Patient relates significant relief during SCS trial  Questions answered at today's visit  Pt encouraged to call our office or rep with further questions if needed  Pt to call our office to schedule implant when ready

## 2022-03-29 ENCOUNTER — OFFICE VISIT (OUTPATIENT)
Dept: FAMILY MEDICINE CLINIC | Age: 32
End: 2022-03-29
Payer: MEDICARE

## 2022-03-29 VITALS
DIASTOLIC BLOOD PRESSURE: 70 MMHG | HEART RATE: 87 BPM | HEIGHT: 63 IN | TEMPERATURE: 98 F | WEIGHT: 275 LBS | SYSTOLIC BLOOD PRESSURE: 125 MMHG | BODY MASS INDEX: 48.73 KG/M2 | OXYGEN SATURATION: 98 %

## 2022-03-29 DIAGNOSIS — Z11.59 NEED FOR HEPATITIS C SCREENING TEST: ICD-10-CM

## 2022-03-29 DIAGNOSIS — Z13.6 ENCOUNTER FOR LIPID SCREENING FOR CARDIOVASCULAR DISEASE: ICD-10-CM

## 2022-03-29 DIAGNOSIS — Z00.01 ENCOUNTER FOR WELL ADULT EXAM WITH ABNORMAL FINDINGS: Primary | ICD-10-CM

## 2022-03-29 DIAGNOSIS — E66.01 MORBID OBESITY WITH BMI OF 45.0-49.9, ADULT (HCC): ICD-10-CM

## 2022-03-29 DIAGNOSIS — Z13.220 ENCOUNTER FOR LIPID SCREENING FOR CARDIOVASCULAR DISEASE: ICD-10-CM

## 2022-03-29 DIAGNOSIS — E11.65 UNCONTROLLED TYPE 2 DIABETES MELLITUS WITH HYPERGLYCEMIA (HCC): ICD-10-CM

## 2022-03-29 LAB — HBA1C MFR BLD: 6.6 %

## 2022-03-29 PROCEDURE — 3044F HG A1C LEVEL LT 7.0%: CPT | Performed by: FAMILY MEDICINE

## 2022-03-29 PROCEDURE — G8484 FLU IMMUNIZE NO ADMIN: HCPCS | Performed by: FAMILY MEDICINE

## 2022-03-29 PROCEDURE — 2022F DILAT RTA XM EVC RTNOPTHY: CPT | Performed by: FAMILY MEDICINE

## 2022-03-29 PROCEDURE — 99213 OFFICE O/P EST LOW 20 MIN: CPT | Performed by: FAMILY MEDICINE

## 2022-03-29 PROCEDURE — G8427 DOCREV CUR MEDS BY ELIG CLIN: HCPCS | Performed by: FAMILY MEDICINE

## 2022-03-29 PROCEDURE — 83036 HEMOGLOBIN GLYCOSYLATED A1C: CPT | Performed by: FAMILY MEDICINE

## 2022-03-29 PROCEDURE — G8417 CALC BMI ABV UP PARAM F/U: HCPCS | Performed by: FAMILY MEDICINE

## 2022-03-29 PROCEDURE — 1036F TOBACCO NON-USER: CPT | Performed by: FAMILY MEDICINE

## 2022-03-29 PROCEDURE — 99395 PREV VISIT EST AGE 18-39: CPT | Performed by: FAMILY MEDICINE

## 2022-03-29 ASSESSMENT — PATIENT HEALTH QUESTIONNAIRE - PHQ9
9. THOUGHTS THAT YOU WOULD BE BETTER OFF DEAD, OR OF HURTING YOURSELF: 0
SUM OF ALL RESPONSES TO PHQ QUESTIONS 1-9: 0
8. MOVING OR SPEAKING SO SLOWLY THAT OTHER PEOPLE COULD HAVE NOTICED. OR THE OPPOSITE, BEING SO FIGETY OR RESTLESS THAT YOU HAVE BEEN MOVING AROUND A LOT MORE THAN USUAL: 0
SUM OF ALL RESPONSES TO PHQ9 QUESTIONS 1 & 2: 0
SUM OF ALL RESPONSES TO PHQ QUESTIONS 1-9: 0
2. FEELING DOWN, DEPRESSED OR HOPELESS: 0
5. POOR APPETITE OR OVEREATING: 0
2. FEELING DOWN, DEPRESSED OR HOPELESS: 0
SUM OF ALL RESPONSES TO PHQ9 QUESTIONS 1 & 2: 0
1. LITTLE INTEREST OR PLEASURE IN DOING THINGS: 0
4. FEELING TIRED OR HAVING LITTLE ENERGY: 0
SUM OF ALL RESPONSES TO PHQ QUESTIONS 1-9: 0
SUM OF ALL RESPONSES TO PHQ QUESTIONS 1-9: 0
10. IF YOU CHECKED OFF ANY PROBLEMS, HOW DIFFICULT HAVE THESE PROBLEMS MADE IT FOR YOU TO DO YOUR WORK, TAKE CARE OF THINGS AT HOME, OR GET ALONG WITH OTHER PEOPLE: 0
SUM OF ALL RESPONSES TO PHQ QUESTIONS 1-9: 0
6. FEELING BAD ABOUT YOURSELF - OR THAT YOU ARE A FAILURE OR HAVE LET YOURSELF OR YOUR FAMILY DOWN: 0
3. TROUBLE FALLING OR STAYING ASLEEP: 0
7. TROUBLE CONCENTRATING ON THINGS, SUCH AS READING THE NEWSPAPER OR WATCHING TELEVISION: 0
1. LITTLE INTEREST OR PLEASURE IN DOING THINGS: 0
SUM OF ALL RESPONSES TO PHQ QUESTIONS 1-9: 0

## 2022-03-29 NOTE — PATIENT INSTRUCTIONS
Well Visit, Ages 25 to 48: Care Instructions  Overview     Well visits can help you stay healthy. Your doctor has checked your overall health and may have suggested ways to take good care of yourself. Your doctor also may have recommended tests. At home, you can help prevent illness withhealthy eating, regular exercise, and other steps. Follow-up care is a key part of your treatment and safety. Be sure to make and go to all appointments, and call your doctor if you are having problems. It's also a good idea to know your test results and keep alist of the medicines you take. How can you care for yourself at home?  Get screening tests that you and your doctor decide on. Screening helps find diseases before any symptoms appear.  Eat healthy foods. Choose fruits, vegetables, whole grains, protein, and low-fat dairy foods. Limit fat, especially saturated fat. Reduce salt in your diet.  Limit alcohol. If you are a man, have no more than 2 drinks a day or 14 drinks a week. If you are a woman, have no more than 1 drink a day or 7 drinks a week.  Get at least 30 minutes of physical activity on most days of the week. Walking is a good choice. You also may want to do other activities, such as running, swimming, cycling, or playing tennis or team sports. Discuss any changes in your exercise program with your doctor.  Reach and stay at a healthy weight. This will lower your risk for many problems, such as obesity, diabetes, heart disease, and high blood pressure.  Do not smoke or allow others to smoke around you. If you need help quitting, talk to your doctor about stop-smoking programs and medicines. These can increase your chances of quitting for good.  Care for your mental health. It is easy to get weighed down by worry and stress. Learn strategies to manage stress, like deep breathing and mindfulness, and stay connected with your family and community.  If you find you often feel sad or hopeless, talk with your doctor. Treatment can help.  Talk to your doctor about whether you have any risk factors for sexually transmitted infections (STIs). You can help prevent STIs if you wait to have sex with a new partner (or partners) until you've each been tested for STIs. It also helps if you use condoms (male or female condoms) and if you limit your sex partners to one person who only has sex with you. Vaccines are available for some STIs, such as HPV.  Use birth control if it's important to you to prevent pregnancy. Talk with your doctor about the choices available and what might be best for you.  If you think you may have a problem with alcohol or drug use, talk to your doctor. This includes prescription medicines (such as amphetamines and opioids) and illegal drugs (such as cocaine and methamphetamine). Your doctor can help you figure out what type of treatment is best for you.  Protect your skin from too much sun. When you're outdoors from 10 a.m. to 4 p.m., stay in the shade or cover up with clothing and a hat with a wide brim. Wear sunglasses that block UV rays. Even when it's cloudy, put broad-spectrum sunscreen (SPF 30 or higher) on any exposed skin.  See a dentist one or two times a year for checkups and to have your teeth cleaned.  Wear a seat belt in the car. When should you call for help? Watch closely for changes in your health, and be sure to contact your doctor if you have any problems or symptoms that concern you. Where can you learn more? Go to https://tai.[facility]. org and sign in to your [employer] account. Enter P072 in the [facility] box to learn more about \"Well Visit, Ages 25 to 48: Care Instructions. \"     If you do not have an account, please click on the \"Sign Up Now\" link. Current as of: October 6, 2021               Content Version: 13.2  © 3364-0061 [employer]. Care instructions adapted under license by [facility] ([facility]).  If you have questions about a medical condition or this instruction, always ask your healthcare professional. [facility] any warranty or liability for your use of this information. Heart-Healthy Diet   Sodium, Fat, and Cholesterol Controlled Diet       What Is a Heart Healthy Diet? A heart-healthy diet is one that limits sodium , certain types of fat , and cholesterol . This type of diet is recommended for:   · People with any form of cardiovascular disease (eg, coronary heart disease , peripheral vascular disease , previous heart attack , previous stroke )   · People with risk factors for cardiovascular disease, such as high blood pressure , high cholesterol , or diabetes   · Anyone who wants to lower their risk of developing cardiovascular disease   Sodium    Sodium is a mineral found in many foods. In general, most people consume much more sodium than they need. Diets high in sodium can increase blood pressure and lead to edema (water retention). On a heart-healthy diet, you should consume no more than 2,300 mg (milligrams) of sodium per dayabout the amount in one teaspoon of table salt. The foods highest in sodium include table salt (about 50% sodium), processed foods, convenience foods, and preserved foods. Cholesterol    Cholesterol is a fat-like, waxy substance in your blood. Our bodies make some cholesterol. It is also found in animal products, with the highest amounts in fatty meat, egg yolks, whole milk, cheese, shellfish, and organ meats. On a heart-healthy diet, you should limit your cholesterol intake to less than 200 mg per day. It is normal and important to have some cholesterol in your bloodstream. But too much cholesterol can cause plaque to build up within your arteries, which can eventually lead to a heart attack or stroke.    The two types of cholesterol that are most commonly referred to are:   · Low-density lipoprotein (LDL) cholesterol  Also known as bad cholesterol, this is the cholesterol that tends to build up along your arteries. Bad cholesterol levels are increased by eating fats that are saturated or hydrogenated. Optimal level of this cholesterol is less than 100. Over 130 starts to get risky for heart disease. · High-density lipoprotein (HDL) cholesterol  Also known as good cholesterol, this type of cholesterol actually carries cholesterol away from your arteries and may, therefore, help lower your risk of having a heart attack. You want this level to be high (ideally greater than 60). It is a risk to have a level less than 40. You can raise this good cholesterol by eating olive oil, canola oil, avocados, or nuts. Exercise raises this level, too. Fat    Fat is calorie dense and packs a lot of calories into a small amount of food. Even though fats should be limited due to their high calorie content, not all fats are bad. In fact, some fats are quite healthful. Fat can be broken down into four main types.    · The good-for-you fats are:   ¨ Monounsaturated fat  found in oils such as olive and canola, avocados, and nuts and natural nut butters; can decrease cholesterol levels, while keeping levels of HDL cholesterol high   ¨ Polyunsaturated fat  found in oils such as safflower, sunflower, soybean, corn, and sesame; can decrease total cholesterol and LDL cholesterol   ¨ Omega-3 fatty acids  particularly those found in fatty fish (such as salmon, trout, tuna, mackerel, herring, and sardines); can decrease risk of arrhythmias, decrease triglyceride levels, and slightly lower blood pressure   · The fats that you want to limit are:   ¨ Saturated fat  found in animal products, many fast foods, and a few vegetables; increases total blood cholesterol, including LDL levels   § Animal fats that are saturated include: butter, lard, whole-milk dairy products, meat fat, and poultry skin   § Vegetable fats that are saturated include: hydrogenated shortening, palm oil, coconut oil, cocoa butter   ¨ Hydrogenated or trans fat  found in margarine and vegetable shortening, most shelf stable snack foods, and fried foods; increases LDL and decreases HDL     It is generally recommended that you limit your total fat for the day to less than 30% of your total calories. If you follow an 1800-calorie heart healthy diet, for example, this would mean 60 grams of fat or less per day. Saturated fat and trans fat in your diet raises your blood cholesterol the most, much more than dietary cholesterol does. For this reason, on a heart-healthy diet, less than 7% of your calories should come from saturated fat and ideally 0% from trans fat. On an 1800-calorie diet, this translates into less than 14 grams of saturated fat per day, leaving 46 grams of fat to come from mono- and polyunsaturated fats.    Food Choices on a Heart Healthy Diet   Food Category   Foods Recommended   Foods to Avoid   Grains   Breads and rolls without salted tops Most dry and cooked cereals Unsalted crackers and breadsticks Low-sodium or homemade breadcrumbs or stuffing All rice and pastas   Breads, rolls, and crackers with salted tops High-fat baked goods (eg, muffins, donuts, pastries) Quick breads, self-rising flour, and biscuit mixes Regular bread crumbs Instant hot cereals Commercially prepared rice, pasta, or stuffing mixes   Vegetables   Most fresh, frozen, and low-sodium canned vegetables Low-sodium and salt-free vegetable juices Canned vegetables if unsalted or rinsed   Regular canned vegetables and juices, including sauerkraut and pickled vegetables Frozen vegetables with sauces Commercially prepared potato and vegetable mixes   Fruits   Most fresh, frozen, and canned fruits All fruit juices   Fruits processed with salt or sodium   Milk   Nonfat or low-fat (1%) milk Nonfat or low-fat yogurt Cottage cheese, low-fat ricotta, cheeses labeled as low-fat and low-sodium   Whole milk Reduced-fat (2%) milk Malted and chocolate milk Full fat yogurt Most cheeses (unless low-fat and low salt) Buttermilk (no more than 1 cup per week)   Meats and Beans   Lean cuts of fresh or frozen beef, veal, lamb, or pork (look for the word loin) Fresh or frozen poultry without the skin Fresh or frozen fish and some shellfish Egg whites and egg substitutes (Limit whole eggs to three per week) Tofu Nuts or seeds (unsalted, dry-roasted), low-sodium peanut butter Dried peas, beans, and lentils   Any smoked, cured, salted, or canned meat, fish, or poultry (including card, chipped beef, cold cuts, hot dogs, sausages, sardines, and anchovies) Poultry skins Breaded and/or fried fish or meats Canned peas, beans, and lentils Salted nuts   Fats and Oils   Olive oil and canola oil Low-sodium, low-fat salad dressings and mayonnaise   Butter, margarine, coconut and palm oils, card fat   Snacks, Sweets, and Condiments   Low-sodium or unsalted versions of broths, soups, soy sauce, and condiments Pepper, herbs, and spices; vinegar, lemon, or lime juice Low-fat frozen desserts (yogurt, sherbet, fruit bars) Sugar, cocoa powder, honey, syrup, jam, and preserves Low-fat, trans-fat free cookies, cakes, and pies Segun and animal crackers, fig bars, randy snaps   High-fat desserts Broth, soups, gravies, and sauces, made from instant mixes or other high-sodium ingredients Salted snack foods Canned olives Meat tenderizers, seasoning salt, and most flavored vinegars   Beverages   Low-sodium carbonated beverages Tea and coffee in moderation Soy milk   Commercially softened water   Suggestions   · Make whole grains, fruits, and vegetables the base of your diet. · Choose heart-healthy fats such as canola, olive, and flaxseed oil, and foods high in heart-healthy fats, such as nuts, seeds, soybeans, tofu, and fish. · Eat fish at least twice per week; the fish highest in omega-3 fatty acids and lowest in mercury include salmon, herring, mackerel, sardines, and canned chunk light tuna.  If you eat fish less than twice per week or have high triglycerides, talk to your doctor about taking fish oil supplements. · Read food labels. ¨ For products low in fat and cholesterol, look for fat free, low-fat, cholesterol free, saturated fat free, and trans fat freeAlso scan the Nutrition Facts Label, which lists saturated fat, trans fat, and cholesterol amounts. ¨ For products low in sodium, look for sodium free, very low sodium, low sodium, no added salt, and unsalted   · Skip the salt when cooking or at the table; if food needs more flavor, get creative and try out different herbs and spices. Garlic and onion also add substantial flavor to foods. · Trim any visible fat off meat and poultry before cooking, and drain the fat off after hammer. · Use cooking methods that require little or no added fat, such as grilling, boiling, baking, poaching, broiling, roasting, steaming, stir-frying, and sauting. · Avoid fast food and convenience food. They tend to be high in saturated and trans fat and have a lot of added salt. · Talk to a registered dietitian for individualized diet advice.       Last Reviewed: March 2011 Memo Concepcion MS, MPH, RD   Updated: 3/29/2011

## 2022-03-29 NOTE — PROGRESS NOTES
Well Adult Note  Name: Eleazar Mcdaniel Date: 3/29/2022   MRN: [id number] Sex: Female   Age: 36 y.o. Ethnicity: Non- / Non    : 1990 Race: White (non-)      Jessica Syed is here for well adult exam.    History:    The patient has been following up with the psychiatrist due to her bipolar 1 disorder and also with the pain management specialist.  She states that she did have a spine stimulator trial and she just had removed and she states that her back pain has quite improved. She will continue to follow-up with pain specialist.    The patient did gain weight since I last saw her in 2020. Around 38 pounds. She states that she does not eat a healthy diet. She does not exercise if she should. Review of Systems   Pertinent items are noted in HPI. Allergies   Allergen Reactions    Cat Hair Extract     Dust Mite Extract     Molds & Smuts     Seasonal          Prior to Visit Medications    Medication Sig Taking?  Authorizing Provider   buPROPion (WELLBUTRIN XL) 300 MG extended release tablet TAKE 1 TABLET BY MOUTH ONCE DAILY Yes Historical Provider, MD   hydrOXYzine (ATARAX) 25 MG tablet TAKE 1 TABLET BY MOUTH THREE TIMES DAILY AS NEEDED FOR ITCHING OR ANXIETY Yes Historical Provider, MD   meloxicam (MOBIC) 15 MG tablet Take 1 tablet by mouth once daily Yes Trace Maldonado MD   medroxyPROGESTERone (DEPO-PROVERA) 150 MG/ML injection INJECT 1 ML INTRAMUSCULARLY ONCE EVERY 3 MONTHS Yes Margaux Garza, APRN - CNM   buPROPion (WELLBUTRIN XL) 150 MG extended release tablet  Yes Historical Provider, MD   lurasidone (LATUDA) 60 MG TABS tablet Take 60 mg by mouth daily Yes Historical Provider, MD   ketoconazole (NIZORAL) 2 % shampoo Apply 3-4 times weekly to scalp, leave on for five minutes prior to washing off Yes Tanya Mario MD   FLUoxetine (PROZAC) 20 MG tablet 20 mg 2 times daily  Yes Historical Provider, MD   diclofenac (VOLTAREN) 75 MG EC tablet TAKE 1 TABLET BY MOUTH EVERY 12 HOURS AS NEEDED FOR BODY ACHES Yes Historical Provider, MD   QUEtiapine (SEROQUEL) 100 MG tablet   Historical Provider, MD   OXcarbazepine (TRILEPTAL) 150 MG tablet TAKE 1 TABLET BY MOUTH IN THE MORNING FOR 2 WEEKS AND THEN 1 TAB TWICE DAILY  Patient not taking: Reported on 3/29/2022  Historical Provider, MD   Elastic Bandages & Supports (LUMBAR BACK BRACE/SUPPORT PAD) MISC 1 Units by Does not apply route daily PNEUMATIC OFF Dylon Dow MD         Past Medical History:   Diagnosis Date    ADHD     Anxiety     Back pain     Brain injury ([facility] [address]     Depression     Hearing loss     Learning disability     Migraine headache     Obese        Past Surgical History:   Procedure Laterality Date    OTHER SURGICAL HISTORY Bilateral 03/25/2021    lumbar DENIZ    PAIN MANAGEMENT PROCEDURE Bilateral 12/14/2020    EPIDURAL STEROID INJECTION BILATERAL L5 performed by Michelle Perez MD at [place] [address] Bilateral 1/4/2021    EPIDURAL STEROID INJECTION BILATERAL L5 performed by Michelle Perez MD at [place] [address] Bilateral 3/25/2021    LUMBAR FACET STEROID INJECTION BILATERAL L4 / 5 performed by Michelle Perez MD at [address] [place] [address]  04/15/2021    MID BACK CYST LESION BIOPSY EXCISION    SKIN BIOPSY N/A 4/15/2021    MID BACK CYST LESION BIOPSY EXCISION performed by Dionne Gomez DO at [address] [place] [address]           Family History   Problem Relation Age of Onset    Diabetes Mother     Heart Disease Father     Diabetes Father     Diabetes Maternal Grandmother     Diabetes Maternal Grandfather     Diabetes Paternal Grandmother     Diabetes Paternal Grandfather     Other Sister         sepsis in blood stream and pneumonia    Alcohol Abuse Brother     Drug Abuse Brother     Alcohol Abuse Brother     Drug Abuse Brother     Breast Cancer Neg Hx     Colon Cancer Neg Hx     Ovarian Cancer Neg Hx        Social History     Tobacco Use    Smoking status: Former Smoker     Types: Cigarettes     Quit date: 2016     Years since quittin.2    Smokeless tobacco: Never Used   Vaping Use    Vaping Use: Former   Substance Use Topics    Alcohol use: Never     Alcohol/week: 0.0 standard drinks    Drug use: No       Objective   /70   Pulse 87   Temp 98 °F (36.7 °C)   Ht 5' 3\" (1.6 m)   Wt 275 lb (124.7 kg)   SpO2 98%   BMI 48.71 kg/m²   Wt Readings from Last 3 Encounters:   22 275 lb (124.7 kg)   22 273 lb (123.8 kg)   22 271 lb (122.9 kg)     There were no vitals filed for this visit. Physical Exam  HENT:   /70   Pulse 87   Temp 98 °F (36.7 °C)   Ht 5' 3\" (1.6 m)   Wt 275 lb (124.7 kg)   SpO2 98%   BMI 48.71 kg/m²   Constitutional: Alert and oriented. Well-nourished. Morbid obese. Head: Normocephalic and atraumatic. Right Ear: External ear normal. TM: no bulging, erythema or fluid seen. Left Ear: External ear normal. TM: no bulging, erythema or fluid seen. Nose: Nose normal.   Mouth/Throat: Oropharynx is clear and moist. Teeth in good repair. Eyes: Pupils are equal, round, and reactive to light. Right eye exhibits no discharge. Left eye exhibits no discharge. No scleral icterus. Neck: Normal range of motion. Neck supple. No JVD present. No tracheal deviation present. No thyromegaly present. Cardiovascular: Normal rate, regular rhythm, normal heart soundss. Pulmonary/Chest: Effort normal and breath sounds normal. No respiratory distress. She has no wheezes. She has no rales. Abdominal: Soft. Bowel sounds are normal.  She exhibits no distension and no mass. There is no tenderness. There is no rebound and no guarding. Musculoskeletal: Normal range of motion. She exhibits no edema or tenderness. Lymphadenopathy:    She has no cervical adenopathy. Neurological:  She is alert and oriented to person, place, and time. Cranial nerves grossly intact. No sensation problem noted. Muscle strength 5/5 throughout. Skin: Skin is warm and dry. No rash noted. No erythema. Psychiatric:  She has a normal mood and affect. Behavior is normal.      Assessment   Plan   1. Encounter for well adult exam with abnormal findings  -     Hepatitis C Antibody; Future  -     Lipid Panel; Future  -     TSH with Reflex; Future  -     CBC with Auto Differential; Future  -     Comprehensive Metabolic Panel; Future  2. Uncontrolled type 2 diabetes mellitus with hyperglycemia (HCC)  -     Comprehensive Metabolic Panel; Future  -     POCT glycosylated hemoglobin (Hb A1C)  3. Morbid obesity with BMI of 45.0-49.9, adult ([facility] [address]  -     Lipid Panel; Future  -     TSH with Reflex; Future  4. Encounter for lipid screening for cardiovascular disease  -     Lipid Panel; Future  5. Need for hepatitis C screening test  -     Hepatitis C Antibody; Future  Yearly blood work ordered. The patient's A1c is 6.6 today. I discussed with her in detail exercise and diet. I will not start her on any medications yet. But she will be back in 3 months. By then the A1c was below 6.5. I also encouraged her to lose weight. This is weight is affecting her diabetes is affecting her joints her heart or lung. Also discussed with her that her mood obesity could affect her liver in a very bad outcome including fatty liver/possible [place]. Again hopefully the patient will lose weight. I will have her back in 3 months. Call or return to clinic prn if these symptoms worsen or fail to improve as anticipated. I have reviewed the instructions with the patient, answering all questions to her satisfaction. Personalized Preventive Plan   Current Health Maintenance Status    There is no immunization history on file for this patient.      Health Maintenance   Topic Date Due    Hepatitis C screen  Never done    Varicella vaccine (1 of 2 - 2-dose childhood series) Never done   Ladonna COVID-19 Vaccine (1) Never done    HIV screen  Never done    DTaP/Tdap/Td vaccine (1 - Tdap) Never done    Flu vaccine (1) Never done    Depression Monitoring  03/16/2022    A1C test (Diabetic or Prediabetic)  06/29/2022    Pap smear  11/22/2024    Hepatitis A vaccine  Aged Out    Hepatitis B vaccine  Aged Out    Hib vaccine  Aged Out    Meningococcal (ACWY) vaccine  Aged Out    Pneumococcal 0-64 years Vaccine  Aged Out     Recommendations for Engiver Due: see orders and patient instructions/AVS.    Return in about 3 months (around 6/29/2022), or if symptoms worsen or fail to improve, for DM II.

## 2022-04-04 RX ORDER — BACLOFEN 10 MG/1
TABLET ORAL
Qty: 30 TABLET | Refills: 0 | OUTPATIENT
Start: 2022-04-04

## 2022-04-04 RX ORDER — BACLOFEN 10 MG/1
10 TABLET ORAL DAILY
Qty: 30 TABLET | Refills: 1 | Status: SHIPPED | OUTPATIENT
Start: 2022-04-04 | End: 2022-06-06

## 2022-04-05 ENCOUNTER — HOSPITAL ENCOUNTER (OUTPATIENT)
Age: 32
Discharge: HOME | End: 2022-04-05
Payer: MEDICARE

## 2022-04-05 DIAGNOSIS — Z13.220 ENCOUNTER FOR LIPID SCREENING FOR CARDIOVASCULAR DISEASE: ICD-10-CM

## 2022-04-05 DIAGNOSIS — E66.01 MORBID OBESITY WITH BMI OF 45.0-49.9, ADULT (HCC): ICD-10-CM

## 2022-04-05 DIAGNOSIS — Z13.6 ENCOUNTER FOR LIPID SCREENING FOR CARDIOVASCULAR DISEASE: ICD-10-CM

## 2022-04-05 DIAGNOSIS — Z00.01 ENCOUNTER FOR WELL ADULT EXAM WITH ABNORMAL FINDINGS: ICD-10-CM

## 2022-04-05 DIAGNOSIS — E11.65 UNCONTROLLED TYPE 2 DIABETES MELLITUS WITH HYPERGLYCEMIA (HCC): ICD-10-CM

## 2022-04-05 DIAGNOSIS — Z11.59 NEED FOR HEPATITIS C SCREENING TEST: ICD-10-CM

## 2022-04-05 LAB
ABSOLUTE EOS #: 0.2 K/UL (ref 0–0.44)
ABSOLUTE IMMATURE GRANULOCYTE: 0.03 K/UL (ref 0–0.3)
ABSOLUTE LYMPH #: 2.19 K/UL (ref 1.1–3.7)
ABSOLUTE MONO #: 0.56 K/UL (ref 0.1–1.2)
ALBUMIN SERPL-MCNC: 4.3 G/DL (ref 3.5–5.2)
ALBUMIN/GLOBULIN RATIO: 1.6 (ref 1–2.5)
ALP BLD-CCNC: 66 U/L (ref 35–104)
ALT SERPL-CCNC: 30 U/L (ref 5–33)
ANION GAP SERPL CALCULATED.3IONS-SCNC: 15 MMOL/L (ref 9–17)
AST SERPL-CCNC: 19 U/L
BASOPHILS # BLD: 1 % (ref 0–2)
BASOPHILS ABSOLUTE: 0.08 K/UL (ref 0–0.2)
BILIRUB SERPL-MCNC: 0.27 MG/DL (ref 0.3–1.2)
BUN BLDV-MCNC: 12 MG/DL (ref 6–20)
CALCIUM SERPL-MCNC: 9.2 MG/DL (ref 8.6–10.4)
CHLORIDE BLD-SCNC: 105 MMOL/L (ref 98–107)
CHOLESTEROL/HDL RATIO: 4.9
CHOLESTEROL: 193 MG/DL
CO2: 22 MMOL/L (ref 20–31)
CREAT SERPL-MCNC: 0.89 MG/DL (ref 0.5–0.9)
EOSINOPHILS RELATIVE PERCENT: 3 % (ref 1–4)
GFR AFRICAN AMERICAN: >60 ML/MIN
GFR NON-AFRICAN AMERICAN: >60 ML/MIN
GFR SERPL CREATININE-BSD FRML MDRD: ABNORMAL ML/MIN/{1.73_M2}
GLUCOSE BLD-MCNC: 114 MG/DL (ref 70–99)
HCT VFR BLD CALC: 45 % (ref 36.3–47.1)
HDLC SERPL-MCNC: 39 MG/DL
HEMOGLOBIN: 13.7 G/DL (ref 11.9–15.1)
HEPATITIS C ANTIBODY: NONREACTIVE
IMMATURE GRANULOCYTES: 1 %
LDL CHOLESTEROL: 126 MG/DL (ref 0–130)
LYMPHOCYTES # BLD: 35 % (ref 24–43)
MCH RBC QN AUTO: 25.5 PG (ref 25.2–33.5)
MCHC RBC AUTO-ENTMCNC: 30.4 G/DL (ref 28.4–34.8)
MCV RBC AUTO: 83.6 FL (ref 82.6–102.9)
MONOCYTES # BLD: 9 % (ref 3–12)
NRBC AUTOMATED: 0 PER 100 WBC
PDW BLD-RTO: 14.6 % (ref 11.8–14.4)
PLATELET # BLD: 375 K/UL (ref 138–453)
PMV BLD AUTO: 10.4 FL (ref 8.1–13.5)
POTASSIUM SERPL-SCNC: 4.2 MMOL/L (ref 3.7–5.3)
RBC # BLD: 5.38 M/UL (ref 3.95–5.11)
RBC # BLD: ABNORMAL 10*6/UL
SEG NEUTROPHILS: 51 % (ref 36–65)
SEGMENTED NEUTROPHILS ABSOLUTE COUNT: 3.23 K/UL (ref 1.5–8.1)
SODIUM BLD-SCNC: 142 MMOL/L (ref 135–144)
TOTAL PROTEIN: 7 G/DL (ref 6.4–8.3)
TRIGL SERPL-MCNC: 142 MG/DL
TSH SERPL DL<=0.05 MIU/L-ACNC: 1.72 UIU/ML (ref 0.3–5)
WBC # BLD: 6.3 K/UL (ref 3.5–11.3)

## 2022-04-05 PROCEDURE — 80053 COMPREHEN METABOLIC PANEL: CPT

## 2022-04-05 PROCEDURE — 36415 COLL VENOUS BLD VENIPUNCTURE: CPT

## 2022-04-05 PROCEDURE — 86803 HEPATITIS C AB TEST: CPT

## 2022-04-05 PROCEDURE — 85025 COMPLETE CBC W/AUTO DIFF WBC: CPT

## 2022-04-05 PROCEDURE — 80061 LIPID PANEL: CPT

## 2022-04-05 PROCEDURE — 84443 ASSAY THYROID STIM HORMONE: CPT

## 2022-04-19 DIAGNOSIS — Z30.42 ENCOUNTER FOR SURVEILLANCE OF INJECTABLE CONTRACEPTIVE: ICD-10-CM

## 2022-04-19 RX ORDER — MELOXICAM 15 MG/1
TABLET ORAL
Qty: 30 TABLET | Refills: 0 | OUTPATIENT
Start: 2022-04-19

## 2022-04-19 RX ORDER — MEDROXYPROGESTERONE ACETATE 150 MG/ML
INJECTION, SUSPENSION INTRAMUSCULAR
Qty: 1 ML | Refills: 0 | Status: SHIPPED | OUTPATIENT
Start: 2022-04-19 | End: 2022-06-09 | Stop reason: ALTCHOICE

## 2022-04-19 NOTE — TELEPHONE ENCOUNTER
Tomasa Levy is requesting a refill on depo.      Last Annual visit:  11/22/21  Next Office visit:  04/25/22    Currently Pregnant no  Last OB visit: n/a  Next OB visit: n/a    Last prescribing provider:  Viktoria Santana

## 2022-04-25 ENCOUNTER — NURSE ONLY (OUTPATIENT)
Dept: OBGYN CLINIC | Age: 32
End: 2022-04-25
Payer: MEDICARE

## 2022-04-25 VITALS
HEIGHT: 63 IN | SYSTOLIC BLOOD PRESSURE: 115 MMHG | BODY MASS INDEX: 47.77 KG/M2 | WEIGHT: 269.6 LBS | DIASTOLIC BLOOD PRESSURE: 89 MMHG | HEART RATE: 99 BPM

## 2022-04-25 DIAGNOSIS — N94.6 DYSMENORRHEA: Primary | ICD-10-CM

## 2022-04-25 DIAGNOSIS — Z30.42 ENCOUNTER FOR SURVEILLANCE OF INJECTABLE CONTRACEPTIVE: ICD-10-CM

## 2022-04-25 PROCEDURE — 96372 THER/PROPH/DIAG INJ SC/IM: CPT | Performed by: ADVANCED PRACTICE MIDWIFE

## 2022-04-25 RX ORDER — MELOXICAM 15 MG/1
TABLET ORAL
Qty: 30 TABLET | Refills: 1 | Status: SHIPPED | OUTPATIENT
Start: 2022-04-25 | End: 2022-08-01 | Stop reason: ALTCHOICE

## 2022-04-25 RX ORDER — MELOXICAM 15 MG/1
TABLET ORAL
Qty: 30 TABLET | Refills: 0 | OUTPATIENT
Start: 2022-04-25

## 2022-04-25 RX ORDER — MEDROXYPROGESTERONE ACETATE 150 MG/ML
150 INJECTION, SUSPENSION INTRAMUSCULAR ONCE
Status: COMPLETED | OUTPATIENT
Start: 2022-04-25 | End: 2022-04-25

## 2022-04-25 RX ADMIN — MEDROXYPROGESTERONE ACETATE 150 MG: 150 INJECTION, SUSPENSION INTRAMUSCULAR at 14:15

## 2022-06-06 RX ORDER — BACLOFEN 10 MG/1
TABLET ORAL
Qty: 30 TABLET | Refills: 0 | Status: SHIPPED | OUTPATIENT
Start: 2022-06-06 | End: 2022-08-01 | Stop reason: ALTCHOICE

## 2022-06-09 ENCOUNTER — OFFICE VISIT (OUTPATIENT)
Dept: DERMATOLOGY | Age: 32
End: 2022-06-09
Payer: MEDICARE

## 2022-06-09 VITALS
DIASTOLIC BLOOD PRESSURE: 85 MMHG | SYSTOLIC BLOOD PRESSURE: 117 MMHG | WEIGHT: 269 LBS | HEIGHT: 63 IN | HEART RATE: 108 BPM | TEMPERATURE: 97.7 F | BODY MASS INDEX: 47.66 KG/M2 | OXYGEN SATURATION: 97 %

## 2022-06-09 DIAGNOSIS — L40.50 PSORIATIC ARTHRITIS (HCC): ICD-10-CM

## 2022-06-09 DIAGNOSIS — Z79.899 HIGH RISK MEDICATION USE: ICD-10-CM

## 2022-06-09 DIAGNOSIS — L40.9 PSORIASIS: Primary | ICD-10-CM

## 2022-06-09 PROCEDURE — 99214 OFFICE O/P EST MOD 30 MIN: CPT | Performed by: PHYSICIAN ASSISTANT

## 2022-06-09 PROCEDURE — G8427 DOCREV CUR MEDS BY ELIG CLIN: HCPCS | Performed by: PHYSICIAN ASSISTANT

## 2022-06-09 PROCEDURE — G8417 CALC BMI ABV UP PARAM F/U: HCPCS | Performed by: PHYSICIAN ASSISTANT

## 2022-06-09 PROCEDURE — 1036F TOBACCO NON-USER: CPT | Performed by: PHYSICIAN ASSISTANT

## 2022-06-09 RX ORDER — CLOBETASOL PROPIONATE 0.46 MG/ML
SOLUTION TOPICAL
Qty: 50 ML | Refills: 2 | Status: SHIPPED | OUTPATIENT
Start: 2022-06-09 | End: 2022-09-09 | Stop reason: SDUPTHER

## 2022-06-09 NOTE — PROGRESS NOTES
Dermatology Patient Note  [facility] [address]  [facility]  Dept: [phone]  Dept Fax: [phone]      VISITDATE: 6/9/2022   REFERRING PROVIDER: No ref. provider found      Duane Wheeler is a 36 y.o. female  who presents today in the office for:    Psoriasis (patient has not been seen since 2019 for her psoriasis- she was started on ketoconazle shampoo for her scalp. States its now in her ears, around her nose, on elbows, and her eyebrows )      HISTORY OF PRESENT ILLNESS:  As above. 5% BSA.  +PsA, with stiff hands and joint swelling. MEDICAL PROBLEMS:  Patient Active Problem List    Diagnosis Date Noted    Spinal cord stimulator status 03/21/2022    DDD (degenerative disc disease), lumbar 11/23/2021    Lumbar facet joint syndrome 10/19/2021    Arthritis 04/05/2021    Knee pain 04/05/2021     Right knee, ongoing.  Left lumbar radiculitis 01/04/2021    Lumbar disc herniation 11/24/2020    Chronic bilateral low back pain with bilateral sciatica 12/04/2019    Bipolar 1 disorder, depressed ([facility] 10/22/2019    Encounter for Nexplanon removal 08/15/2018    Encounter for initial prescription of injectable contraceptive 08/15/2018    Back pain, chronic 01/19/2018    Attention or concentration deficit 12/06/2016    Irregular bleeding 05/03/2016       CURRENT MEDICATIONS:   Current Outpatient Medications   Medication Sig Dispense Refill    Lumateperone Tosylate 42 MG CAPS Take 42 mg by mouth daily      clobetasol (TEMOVATE) 0.05 % external solution Apply topically 2 times daily, as needed.  50 mL 2    baclofen (LIORESAL) 10 MG tablet Take 1 tablet by mouth once daily 30 tablet 0    meloxicam (MOBIC) 15 MG tablet Take 1 tablet by mouth once daily 30 tablet 1    hydrOXYzine (ATARAX) 25 MG tablet TAKE 1 TABLET BY MOUTH THREE TIMES DAILY AS NEEDED FOR ITCHING OR ANXIETY      buPROPion (WELLBUTRIN XL) 150 MG extended release tablet       lurasidone (LATUDA) 60 MG TABS tablet Take 100 mg by mouth daily       ketoconazole (NIZORAL) 2 % shampoo Apply 3-4 times weekly to scalp, leave on for five minutes prior to washing off 120 mL 2     No current facility-administered medications for this visit. ALLERGIES:   Allergies   Allergen Reactions    Cat Hair Extract     Dust Mite Extract     Molds & Smuts     Seasonal        SOCIAL HISTORY:  Social History     Tobacco Use    Smoking status: Former Smoker     Types: Cigarettes     Quit date: 2016     Years since quittin.4    Smokeless tobacco: Never Used   Substance Use Topics    Alcohol use: Never     Alcohol/week: 0.0 standard drinks       Pertinent ROS:  Review of Systems  Skin: Denies any new changing, growing or bleeding lesions or rashes except as described in the HPI   Constitutional: Denies fevers, chills, and malaise. PHYSICAL EXAM:   /85   Pulse (!) 108   Temp 97.7 °F (36.5 °C)   Ht 5' 3\" (1.6 m)   Wt 269 lb (122 kg)   SpO2 97%   BMI 47.65 kg/m²     The patient is generally well appearing, well nourished, alert and conversational. Affect is normal.    Cutaneous Exam:  Physical Exam  Sun exposed + limited LEs: Head/face,neck, both arms, digits and/or nails and legs visible with pants/shorts and shoes/socks on was examined. Facial covering was removed during examination. Diagnoses/exam findings/medical history pertinent to this visit are listed below:    Assessment:   Diagnosis Orders   1. Psoriasis  clobetasol (TEMOVATE) 0.05 % external solution   2. Psoriatic arthritis ([facility]          Plan:  1. Psoriasis  - Humira approval, pending labs  - clobetasol (TEMOVATE) 0.05 % external solution; Apply topically 2 times daily, as needed. Dispense: 50 mL; Refill: 2    2.  Psoriatic arthritis ([address]  - Humira      RTC 3M    Future Appointments   Date Time Provider Sajan Pardo   2022  1:45 PM Matthew Gordillo MD [facility]   9/9/2022  1:30 PM [facility]         There are no Patient Instructions on file for this visit.       Electronically signed by Stella Ayoub PA-C on 6/9/22 at 5:16 PM EDT

## 2022-06-23 ENCOUNTER — HOSPITAL ENCOUNTER (OUTPATIENT)
Age: 32
Discharge: HOME | End: 2022-06-23
Payer: MEDICARE

## 2022-06-23 DIAGNOSIS — Z79.899 HIGH RISK MEDICATION USE: ICD-10-CM

## 2022-06-23 LAB
ABSOLUTE EOS #: 0.16 K/UL (ref 0–0.44)
ABSOLUTE IMMATURE GRANULOCYTE: 0.03 K/UL (ref 0–0.3)
ABSOLUTE LYMPH #: 1.96 K/UL (ref 1.1–3.7)
ABSOLUTE MONO #: 0.46 K/UL (ref 0.1–1.2)
ALBUMIN SERPL-MCNC: 4.5 G/DL (ref 3.5–5.2)
ALBUMIN/GLOBULIN RATIO: 1.6 (ref 1–2.5)
ALP BLD-CCNC: 74 U/L (ref 35–104)
ALT SERPL-CCNC: 24 U/L (ref 5–33)
ANION GAP SERPL CALCULATED.3IONS-SCNC: 16 MMOL/L (ref 9–17)
AST SERPL-CCNC: 16 U/L
BASOPHILS # BLD: 2 % (ref 0–2)
BASOPHILS ABSOLUTE: 0.09 K/UL (ref 0–0.2)
BILIRUB SERPL-MCNC: 0.3 MG/DL (ref 0.3–1.2)
BUN BLDV-MCNC: 12 MG/DL (ref 6–20)
CALCIUM SERPL-MCNC: 9.7 MG/DL (ref 8.6–10.4)
CHLORIDE BLD-SCNC: 105 MMOL/L (ref 98–107)
CO2: 19 MMOL/L (ref 20–31)
CREAT SERPL-MCNC: 0.82 MG/DL (ref 0.5–0.9)
EOSINOPHILS RELATIVE PERCENT: 3 % (ref 1–4)
GFR AFRICAN AMERICAN: >60 ML/MIN
GFR NON-AFRICAN AMERICAN: >60 ML/MIN
GFR SERPL CREATININE-BSD FRML MDRD: ABNORMAL ML/MIN/{1.73_M2}
GLUCOSE BLD-MCNC: 117 MG/DL (ref 70–99)
HAV IGM SER IA-ACNC: NONREACTIVE
HCT VFR BLD CALC: 44.6 % (ref 36.3–47.1)
HEMOGLOBIN: 13.5 G/DL (ref 11.9–15.1)
HEPATITIS B CORE IGM ANTIBODY: NONREACTIVE
HEPATITIS B SURFACE ANTIGEN: NONREACTIVE
HEPATITIS C ANTIBODY: NONREACTIVE
HIV AG/AB: NONREACTIVE
IMMATURE GRANULOCYTES: 1 %
LYMPHOCYTES # BLD: 32 % (ref 24–43)
MCH RBC QN AUTO: 25 PG (ref 25.2–33.5)
MCHC RBC AUTO-ENTMCNC: 30.3 G/DL (ref 28.4–34.8)
MCV RBC AUTO: 82.7 FL (ref 82.6–102.9)
MONOCYTES # BLD: 8 % (ref 3–12)
NRBC AUTOMATED: 0 PER 100 WBC
PDW BLD-RTO: 14.5 % (ref 11.8–14.4)
PLATELET # BLD: 397 K/UL (ref 138–453)
PMV BLD AUTO: 10.3 FL (ref 8.1–13.5)
POTASSIUM SERPL-SCNC: 4.2 MMOL/L (ref 3.7–5.3)
RBC # BLD: 5.39 M/UL (ref 3.95–5.11)
RBC # BLD: ABNORMAL 10*6/UL
SEG NEUTROPHILS: 54 % (ref 36–65)
SEGMENTED NEUTROPHILS ABSOLUTE COUNT: 3.46 K/UL (ref 1.5–8.1)
SODIUM BLD-SCNC: 140 MMOL/L (ref 135–144)
TOTAL PROTEIN: 7.3 G/DL (ref 6.4–8.3)
WBC # BLD: 6.2 K/UL (ref 3.5–11.3)

## 2022-06-23 PROCEDURE — 80053 COMPREHEN METABOLIC PANEL: CPT

## 2022-06-23 PROCEDURE — 87389 HIV-1 AG W/HIV-1&-2 AB AG IA: CPT

## 2022-06-23 PROCEDURE — 85025 COMPLETE CBC W/AUTO DIFF WBC: CPT

## 2022-06-23 PROCEDURE — 36415 COLL VENOUS BLD VENIPUNCTURE: CPT

## 2022-06-23 PROCEDURE — 80074 ACUTE HEPATITIS PANEL: CPT

## 2022-06-23 PROCEDURE — 86480 TB TEST CELL IMMUN MEASURE: CPT

## 2022-06-27 LAB
QUANTI TB GOLD PLUS: NEGATIVE
QUANTI TB1 MINUS NIL: 0.01 IU/ML (ref 0–0.34)
QUANTI TB2 MINUS NIL: 0.01 IU/ML (ref 0–0.34)
QUANTIFERON MITOGEN: 9.67 IU/ML
QUANTIFERON NIL: 0.01 IU/ML

## 2022-06-29 ENCOUNTER — OFFICE VISIT (OUTPATIENT)
Dept: FAMILY MEDICINE CLINIC | Age: 32
End: 2022-06-29
Payer: MEDICARE

## 2022-06-29 VITALS
TEMPERATURE: 97.9 F | OXYGEN SATURATION: 97 % | DIASTOLIC BLOOD PRESSURE: 68 MMHG | HEART RATE: 87 BPM | BODY MASS INDEX: 47.3 KG/M2 | SYSTOLIC BLOOD PRESSURE: 101 MMHG | WEIGHT: 267 LBS

## 2022-06-29 DIAGNOSIS — E11.9 CONTROLLED TYPE 2 DIABETES MELLITUS WITHOUT COMPLICATION, WITHOUT LONG-TERM CURRENT USE OF INSULIN (HCC): Primary | ICD-10-CM

## 2022-06-29 DIAGNOSIS — E66.01 MORBID OBESITY WITH BMI OF 45.0-49.9, ADULT (HCC): ICD-10-CM

## 2022-06-29 LAB — HBA1C MFR BLD: 6.3 %

## 2022-06-29 PROCEDURE — 83036 HEMOGLOBIN GLYCOSYLATED A1C: CPT | Performed by: FAMILY MEDICINE

## 2022-06-29 PROCEDURE — 99213 OFFICE O/P EST LOW 20 MIN: CPT | Performed by: FAMILY MEDICINE

## 2022-06-29 PROCEDURE — G8417 CALC BMI ABV UP PARAM F/U: HCPCS | Performed by: FAMILY MEDICINE

## 2022-06-29 PROCEDURE — 1036F TOBACCO NON-USER: CPT | Performed by: FAMILY MEDICINE

## 2022-06-29 PROCEDURE — 3044F HG A1C LEVEL LT 7.0%: CPT | Performed by: FAMILY MEDICINE

## 2022-06-29 PROCEDURE — 2022F DILAT RTA XM EVC RTNOPTHY: CPT | Performed by: FAMILY MEDICINE

## 2022-06-29 PROCEDURE — G8427 DOCREV CUR MEDS BY ELIG CLIN: HCPCS | Performed by: FAMILY MEDICINE

## 2022-06-29 RX ORDER — PHENTERMINE HYDROCHLORIDE 37.5 MG/1
37.5 TABLET ORAL
Qty: 30 TABLET | Refills: 0 | Status: SHIPPED | OUTPATIENT
Start: 2022-06-29 | End: 2022-07-28 | Stop reason: SDUPTHER

## 2022-06-29 ASSESSMENT — PATIENT HEALTH QUESTIONNAIRE - PHQ9
SUM OF ALL RESPONSES TO PHQ QUESTIONS 1-9: 3
6. FEELING BAD ABOUT YOURSELF - OR THAT YOU ARE A FAILURE OR HAVE LET YOURSELF OR YOUR FAMILY DOWN: 0
7. TROUBLE CONCENTRATING ON THINGS, SUCH AS READING THE NEWSPAPER OR WATCHING TELEVISION: 0
4. FEELING TIRED OR HAVING LITTLE ENERGY: 1
SUM OF ALL RESPONSES TO PHQ QUESTIONS 1-9: 3
SUM OF ALL RESPONSES TO PHQ9 QUESTIONS 1 & 2: 1
2. FEELING DOWN, DEPRESSED OR HOPELESS: 1
SUM OF ALL RESPONSES TO PHQ QUESTIONS 1-9: 3
1. LITTLE INTEREST OR PLEASURE IN DOING THINGS: 0
9. THOUGHTS THAT YOU WOULD BE BETTER OFF DEAD, OR OF HURTING YOURSELF: 0
5. POOR APPETITE OR OVEREATING: 0
3. TROUBLE FALLING OR STAYING ASLEEP: 1
8. MOVING OR SPEAKING SO SLOWLY THAT OTHER PEOPLE COULD HAVE NOTICED. OR THE OPPOSITE, BEING SO FIGETY OR RESTLESS THAT YOU HAVE BEEN MOVING AROUND A LOT MORE THAN USUAL: 0
SUM OF ALL RESPONSES TO PHQ QUESTIONS 1-9: 3
10. IF YOU CHECKED OFF ANY PROBLEMS, HOW DIFFICULT HAVE THESE PROBLEMS MADE IT FOR YOU TO DO YOUR WORK, TAKE CARE OF THINGS AT HOME, OR GET ALONG WITH OTHER PEOPLE: 0

## 2022-06-29 NOTE — PROGRESS NOTES
General FM note    Jaxon Slaughter is a 36 y.o. female who presents today for follow up on her  medical conditions as noted below.   Jaxon Slaughter is c/o of   Chief Complaint   Patient presents with    Diabetes       Patient Active Problem List:     Irregular bleeding     Back pain, chronic     Chronic bilateral low back pain with bilateral sciatica     Encounter for Nexplanon removal     Encounter for initial prescription of injectable contraceptive     Lumbar disc herniation     Left lumbar radiculitis     Arthritis     Attention or concentration deficit     Bipolar 1 disorder, depressed (HCC)     Knee pain     Lumbar facet joint syndrome     DDD (degenerative disc disease), lumbar     Spinal cord stimulator status     Past Medical History:   Diagnosis Date    ADHD     Anxiety     Back pain     Brain injury ([facility]     Depression     Hearing loss     Learning disability     Migraine headache     Obese       Past Surgical History:   Procedure Laterality Date    OTHER SURGICAL HISTORY Bilateral 03/25/2021    lumbar DENIZ    PAIN MANAGEMENT PROCEDURE Bilateral 12/14/2020    EPIDURAL STEROID INJECTION BILATERAL L5 performed by Lester Cooper MD at [address] [place] [address] Bilateral 1/4/2021    EPIDURAL STEROID INJECTION BILATERAL L5 performed by Lester Cooper MD at [address] [place] [address] Bilateral 3/25/2021    LUMBAR FACET STEROID INJECTION BILATERAL L4 / 5 performed by Lester Cooper MD at [facility]  04/15/2021    MID BACK CYST LESION BIOPSY EXCISION    SKIN BIOPSY N/A 4/15/2021    MID BACK CYST LESION BIOPSY EXCISION performed by Radha Altamirano DO at [address] [place] [address]       Family History   Problem Relation Age of Onset    Diabetes Mother     Heart Disease Father     Diabetes Father     Diabetes Maternal Grandmother     Diabetes Maternal Grandfather     Diabetes Paternal Grandmother     Diabetes Paternal Grandfather     Other Sister         sepsis in blood stream and pneumonia    Alcohol Abuse Brother     Drug Abuse Brother     Alcohol Abuse Brother     Drug Abuse Brother     Breast Cancer Neg Hx     Colon Cancer Neg Hx     Ovarian Cancer Neg Hx      Current Outpatient Medications   Medication Sig Dispense Refill    phentermine (ADIPEX-P) 37.5 MG tablet Take 1 tablet by mouth every morning (before breakfast) for 30 days. 30 tablet 0    Lumateperone Tosylate 42 MG CAPS Take 42 mg by mouth daily      clobetasol (TEMOVATE) 0.05 % external solution Apply topically 2 times daily, as needed. 50 mL 2    baclofen (LIORESAL) 10 MG tablet Take 1 tablet by mouth once daily 30 tablet 0    meloxicam (MOBIC) 15 MG tablet Take 1 tablet by mouth once daily 30 tablet 1    hydrOXYzine (ATARAX) 25 MG tablet TAKE 1 TABLET BY MOUTH THREE TIMES DAILY AS NEEDED FOR ITCHING OR ANXIETY      buPROPion (WELLBUTRIN XL) 150 MG extended release tablet       lurasidone (LATUDA) 60 MG TABS tablet Take 100 mg by mouth daily       ketoconazole (NIZORAL) 2 % shampoo Apply 3-4 times weekly to scalp, leave on for five minutes prior to washing off 120 mL 2     No current facility-administered medications for this visit. ALLERGIES:    Allergies   Allergen Reactions    Cat Hair Extract     Dust Mite Extract     Molds & Smuts     Seasonal        Social History     Tobacco Use    Smoking status: Former Smoker     Types: Cigarettes     Quit date: 2016     Years since quittin.4    Smokeless tobacco: Never Used   Substance Use Topics    Alcohol use: Never     Alcohol/week: 0.0 standard drinks      Body mass index is 47.3 kg/m². /68   Pulse 87   Temp 97.9 °F (36.6 °C)   Wt 267 lb (121.1 kg)   SpO2 97%   BMI 47.30 kg/m²     Subjective:      HPI    36 y.o. female lost 8 lbs. she is coming in today for 3-month follow-up of her diabetes mellitus type 2. She states that she only eats once a day most of the time.   She walks on and off. She could do more. But she states that she cannot lose more than 8 pounds. She does not drink any soda pop maybe once or while she mostly drinks Crystal light. She would like to discuss recent blood work as she will start a immunosuppressive medication by her dermatologist/psoriasis. Review of Systems   Constitutional: Negative for fever and unexpected weight change. Pertinent items are noted in HPI. Objective:   Physical Exam  Constitutional: VS (see above). General appearance: normal development, habitus and attention, no deformities. No distress. Eyes: normal conjunctiva and lids. CAV: RRR, no RMG. No edema lower extremities. Pulmo: CTA bilateral, no CWR. Skin: no rashes, lesions or ulcers. Musculoskeletal: normal gait. Nails: no clubbing or cyanosis. Psychiatric: alert and oriented to place, time and person. Normal mood and affect. A1c 6.3 today. Assessment:       Diagnosis Orders   1. Controlled type 2 diabetes mellitus without complication, without long-term current use of insulin ([facility])  POCT glycosylated hemoglobin (Hb A1C)    phentermine (ADIPEX-P) 37.5 MG tablet   2. Morbid obesity with BMI of 45.0-49.9, adult (HCC)  phentermine (ADIPEX-P) 37.5 MG tablet       Plan:   The patient is doing quite well. She will continue exercise and diet. First prescription of Adipex provided. Patient will continue current diet and exercise regimen. I discussed with her to exercise at least 30 minutes 5 times a week. Decrease carbohydrate intake. Increase fibers and protein. See me back in 4 weeks for weight check. Call if any changes. Stop Adipex if you have any side effects. Return in about 4 weeks (around 7/27/2022), or if symptoms worsen or fail to improve, for weight.   Orders Placed This Encounter   Procedures    POCT glycosylated hemoglobin (Hb A1C)     Orders Placed This Encounter   Medications    phentermine (ADIPEX-P) 37.5 MG tablet     Sig: Take 1 tablet by mouth every morning (before breakfast) for 30 days. Dispense:  30 tablet     Refill:  0       Call or return to clinic prn if these symptoms worsen or fail to improve as anticipated. I have reviewed the instructions with the patient, answering all questions to patient's satisfaction. Maira received counseling on the following healthy behaviors: nutrition, exercise, and medication adherence  Reviewed prior labs and health maintenance. Continue current medications, diet and exercise. Discussed use, benefit, and side effects of prescribed medications. Barriers to medication compliance addressed. Patient given educational materials - see patient instructions. All patient questions answered. Patient voiced understanding.       Electronically signed by Shelby Jara MD on 6/29/2022 at 2:00 PM       (Please note that portions of this note were completed with a voice recognition program. Efforts were made to edit the dictations but occasionally words are mis-transcribed.) Quality 130: Documentation Of Current Medications In The Medical Record: Current Medications Documented Quality 111:Pneumonia Vaccination Status For Older Adults: Pneumococcal Vaccination not Administered or Previously Received, Reason not Otherwise Specified Quality 402: Tobacco Use And Help With Quitting Among Adolescents: Patient screened for tobacco and never smoked Quality 110: Preventive Care And Screening: Influenza Immunization: Influenza Immunization previously received during influenza season Detail Level: Detailed

## 2022-07-05 RX ORDER — BACLOFEN 10 MG/1
TABLET ORAL
Qty: 30 TABLET | Refills: 0 | OUTPATIENT
Start: 2022-07-05

## 2022-07-05 RX ORDER — MELOXICAM 15 MG/1
TABLET ORAL
Qty: 30 TABLET | Refills: 0 | OUTPATIENT
Start: 2022-07-05

## 2022-07-11 ENCOUNTER — TELEPHONE (OUTPATIENT)
Dept: DERMATOLOGY | Age: 32
End: 2022-07-11

## 2022-07-11 NOTE — TELEPHONE ENCOUNTER
Patient called back and I informed her that we are still trying to get the Humira approved as its in the process and they have submitted it to [employer].

## 2022-07-13 ENCOUNTER — TELEPHONE (OUTPATIENT)
Dept: DERMATOLOGY | Age: 32
End: 2022-07-13

## 2022-07-13 RX ORDER — ADALIMUMAB 4 MG/ML
KIT INJECTION
Qty: 3 EACH | Refills: 0 | Status: SHIPPED | OUTPATIENT
Start: 2022-07-13 | End: 2022-10-27

## 2022-07-13 RX ORDER — ADALIMUMAB 40MG/0.4ML
KIT SUBCUTANEOUS
Qty: 2 EACH | Refills: 2 | Status: SHIPPED | OUTPATIENT
Start: 2022-07-13

## 2022-07-27 ENCOUNTER — NURSE ONLY (OUTPATIENT)
Dept: DERMATOLOGY | Age: 32
End: 2022-07-27
Payer: MEDICARE

## 2022-07-27 VITALS — OXYGEN SATURATION: 97 % | WEIGHT: 267 LBS | TEMPERATURE: 98.2 F | BODY MASS INDEX: 47.3 KG/M2

## 2022-07-27 DIAGNOSIS — L40.50 PSORIATIC ARTHRITIS (HCC): ICD-10-CM

## 2022-07-27 DIAGNOSIS — L40.9 PSORIASIS: ICD-10-CM

## 2022-07-27 DIAGNOSIS — Z79.899 HIGH RISK MEDICATION USE: ICD-10-CM

## 2022-07-27 DIAGNOSIS — Z71.89 OTHER SPECIFIED COUNSELING: Primary | ICD-10-CM

## 2022-07-27 PROCEDURE — 96372 THER/PROPH/DIAG INJ SC/IM: CPT | Performed by: PHYSICIAN ASSISTANT

## 2022-07-27 NOTE — PROGRESS NOTES
Yusuf Ford came in office today for instruction of injection training with the injection of humira medication. Explained to patient that   their 1st injection is a single dose, every injection after that will just be a single dose. Went over brochure with patient on correct injection sites, angle of pen and how long to hold pen in place after hearing the click and transfer of medication begins. One injection given into rt thigh subcutaneously. Patient tolerated the injections well. Patient was asked to wait 15 minutes before leaving office to make sure they did not have a negative reaction to the medication. Patient advised they felt fine after 15 minutes of observation and were released. subcutaneously.

## 2022-07-28 ENCOUNTER — OFFICE VISIT (OUTPATIENT)
Dept: FAMILY MEDICINE CLINIC | Age: 32
End: 2022-07-28
Payer: MEDICARE

## 2022-07-28 VITALS
WEIGHT: 257 LBS | BODY MASS INDEX: 45.53 KG/M2 | HEART RATE: 91 BPM | SYSTOLIC BLOOD PRESSURE: 110 MMHG | TEMPERATURE: 97.9 F | DIASTOLIC BLOOD PRESSURE: 73 MMHG | OXYGEN SATURATION: 97 %

## 2022-07-28 DIAGNOSIS — E11.9 CONTROLLED TYPE 2 DIABETES MELLITUS WITHOUT COMPLICATION, WITHOUT LONG-TERM CURRENT USE OF INSULIN (HCC): ICD-10-CM

## 2022-07-28 DIAGNOSIS — E66.01 MORBID OBESITY WITH BMI OF 45.0-49.9, ADULT (HCC): ICD-10-CM

## 2022-07-28 PROCEDURE — 99213 OFFICE O/P EST LOW 20 MIN: CPT | Performed by: FAMILY MEDICINE

## 2022-07-28 PROCEDURE — 3044F HG A1C LEVEL LT 7.0%: CPT | Performed by: FAMILY MEDICINE

## 2022-07-28 RX ORDER — PHENTERMINE HYDROCHLORIDE 37.5 MG/1
37.5 TABLET ORAL
Qty: 30 TABLET | Refills: 0 | Status: SHIPPED | OUTPATIENT
Start: 2022-07-28 | End: 2022-08-27

## 2022-07-28 ASSESSMENT — PATIENT HEALTH QUESTIONNAIRE - PHQ9
4. FEELING TIRED OR HAVING LITTLE ENERGY: 1
1. LITTLE INTEREST OR PLEASURE IN DOING THINGS: 0
SUM OF ALL RESPONSES TO PHQ9 QUESTIONS 1 & 2: 1
8. MOVING OR SPEAKING SO SLOWLY THAT OTHER PEOPLE COULD HAVE NOTICED. OR THE OPPOSITE, BEING SO FIGETY OR RESTLESS THAT YOU HAVE BEEN MOVING AROUND A LOT MORE THAN USUAL: 0
9. THOUGHTS THAT YOU WOULD BE BETTER OFF DEAD, OR OF HURTING YOURSELF: 0
2. FEELING DOWN, DEPRESSED OR HOPELESS: 1
7. TROUBLE CONCENTRATING ON THINGS, SUCH AS READING THE NEWSPAPER OR WATCHING TELEVISION: 0
SUM OF ALL RESPONSES TO PHQ QUESTIONS 1-9: 4
5. POOR APPETITE OR OVEREATING: 1
SUM OF ALL RESPONSES TO PHQ QUESTIONS 1-9: 4
6. FEELING BAD ABOUT YOURSELF - OR THAT YOU ARE A FAILURE OR HAVE LET YOURSELF OR YOUR FAMILY DOWN: 0
10. IF YOU CHECKED OFF ANY PROBLEMS, HOW DIFFICULT HAVE THESE PROBLEMS MADE IT FOR YOU TO DO YOUR WORK, TAKE CARE OF THINGS AT HOME, OR GET ALONG WITH OTHER PEOPLE: 0
3. TROUBLE FALLING OR STAYING ASLEEP: 1
SUM OF ALL RESPONSES TO PHQ QUESTIONS 1-9: 4
SUM OF ALL RESPONSES TO PHQ QUESTIONS 1-9: 4

## 2022-07-28 NOTE — PROGRESS NOTES
General FM note    Jayshree Alegre is a 29 y.o. female who presents today for follow up on her  medical conditions as noted below.   Jayshree Alegre is c/o of   Chief Complaint   Patient presents with    Weight Management       Patient Active Problem List:     Irregular bleeding     Back pain, chronic     Chronic bilateral low back pain with bilateral sciatica     Encounter for Nexplanon removal     Encounter for initial prescription of injectable contraceptive     Lumbar disc herniation     Left lumbar radiculitis     Arthritis     Attention or concentration deficit     Bipolar 1 disorder, depressed (HCC)     Knee pain     Lumbar facet joint syndrome     DDD (degenerative disc disease), lumbar     Spinal cord stimulator status     Controlled type 2 diabetes mellitus without complication, without long-term current use of insulin ([facility]     Past Medical History:   Diagnosis Date    ADHD     Anxiety     Back pain     Brain injury ([facility]     Depression     Hearing loss     Learning disability     Migraine headache     Obese       Past Surgical History:   Procedure Laterality Date    OTHER SURGICAL HISTORY Bilateral 03/25/2021    lumbar DENIZ    PAIN MANAGEMENT PROCEDURE Bilateral 12/14/2020    EPIDURAL STEROID INJECTION BILATERAL L5 performed by Darek Cline MD at [address] [place] [address] Bilateral 1/4/2021    EPIDURAL STEROID INJECTION BILATERAL L5 performed by Darek Cline MD at [address] [place] [address] Bilateral 3/25/2021    LUMBAR FACET STEROID INJECTION BILATERAL L4 / 5 performed by Darek Cline MD at [facility]  04/15/2021    MID BACK CYST LESION BIOPSY EXCISION    SKIN BIOPSY N/A 4/15/2021    MID BACK CYST LESION BIOPSY EXCISION performed by Keary Gosselin, DO at [address] EXTRACTION       Family History   Problem Relation Age of Onset    Diabetes Mother     Heart Disease Father     Diabetes Father     Diabetes Maternal Grandmother     Diabetes Maternal Grandfather     Diabetes Paternal Grandmother     Diabetes Paternal Grandfather     Other Sister         sepsis in blood stream and pneumonia    Alcohol Abuse Brother     Drug Abuse Brother     Alcohol Abuse Brother     Drug Abuse Brother     Breast Cancer Neg Hx     Colon Cancer Neg Hx     Ovarian Cancer Neg Hx      Current Outpatient Medications   Medication Sig Dispense Refill    phentermine (ADIPEX-P) 37.5 MG tablet Take 1 tablet by mouth every morning (before breakfast) for 30 days. 30 tablet 0    Adalimumab (HUMIRA PEN-PSOR/UVEIT STARTER) 80 MG/0.8ML & 40MG/0.4ML PNKT Inject 80 mg x 1 on day 1, then 40 mg SC q2wk on day 8 3 each 0    Adalimumab (HUMIRA PEN) 40 MG/0.4ML PNKT Inject 40 mg SC q2wk 2 each 2    Lumateperone Tosylate 42 MG CAPS Take 42 mg by mouth daily      clobetasol (TEMOVATE) 0.05 % external solution Apply topically 2 times daily, as needed. 50 mL 2    baclofen (LIORESAL) 10 MG tablet Take 1 tablet by mouth once daily 30 tablet 0    meloxicam (MOBIC) 15 MG tablet Take 1 tablet by mouth once daily 30 tablet 1    hydrOXYzine (ATARAX) 25 MG tablet TAKE 1 TABLET BY MOUTH THREE TIMES DAILY AS NEEDED FOR ITCHING OR ANXIETY      buPROPion (WELLBUTRIN XL) 150 MG extended release tablet       lurasidone (LATUDA) 60 MG TABS tablet Take 100 mg by mouth daily       ketoconazole (NIZORAL) 2 % shampoo Apply 3-4 times weekly to scalp, leave on for five minutes prior to washing off 120 mL 2     No current facility-administered medications for this visit. ALLERGIES:    Allergies   Allergen Reactions    Cat Hair Extract     Dust Mite Extract     Molds & Smuts     Seasonal        Social History     Tobacco Use    Smoking status: Former     Types: Cigarettes     Quit date: 2016     Years since quittin.5    Smokeless tobacco: Never   Substance Use Topics    Alcohol use: Never     Alcohol/week: 0.0 standard drinks      Body mass index is 45.53 kg/m².   /73   Pulse 91   Temp 97.9 °F (36.6 °C)   Wt 257 lb (116.6 kg)   SpO2 97%   BMI 45.53 kg/m²     Subjective:      HPI    29 y.o. female coming today for weight check. Lost 10 pounds with the 2. prescription of Adipex. Diet: smaller portions. With half pill. The patient has a very low appetite. Exercises: no exercises. Side effects: none    Feels that she has some chest.  Feels like acid reflux. Review of Systems   Constitutional: Negative for fever and unexpected weight change. Pertinent items are noted in HPI. Objective:   Physical Exam  Constitutional: VS (see above). General appearance: normal development, habitus and attention, no deformities. No distress. Eyes: normal conjunctiva and lids. CAV: RRR, no RMG. No edema lower extremities. Pulmo: CTA bilateral, no CWR. Skin: no rashes, lesions or ulcers. Musculoskeletal: normal gait. Nails: no clubbing or cyanosis. Psychiatric: alert and oriented to place, time and person. Normal mood and affect. Assessment:       Diagnosis Orders   1. Controlled type 2 diabetes mellitus without complication, without long-term current use of insulin (HCC)  phentermine (ADIPEX-P) 37.5 MG tablet      2. Morbid obesity with BMI of 45.0-49.9, adult (HCC)  phentermine (ADIPEX-P) 37.5 MG tablet          Plan:      Last Prescription of Adipex provided. Continue exercise and diet. Follow-up as indicated. Start exercising call for any changes. Return in about 7 months (around 2/28/2023), or if symptoms worsen or fail to improve, for weight. No orders of the defined types were placed in this encounter. Orders Placed This Encounter   Medications    phentermine (ADIPEX-P) 37.5 MG tablet     Sig: Take 1 tablet by mouth every morning (before breakfast) for 30 days. Dispense:  30 tablet     Refill:  0       Call or return to clinic prn if these symptoms worsen or fail to improve as anticipated.   I have reviewed the instructions with the patient, answering all questions to her satisfaction. Maira received counseling on the following healthy behaviors: nutrition, exercise, and medication adherence  Reviewed prior labs and health maintenance. Continue current medications, diet and exercise. Discussed use, benefit, and side effects of prescribed medications. Barriers to medication compliance addressed. Patient given educational materials - see patient instructions. All patient questions answered. Patient voiced understanding.       Electronically signed by Judith Hinton MD on 7/28/2022 at 1:48 PM       (Please note that portions of this note were completed with a voice recognition program. Efforts were made to edit the dictations but occasionally words are mis-transcribed.)

## 2022-08-01 ENCOUNTER — OFFICE VISIT (OUTPATIENT)
Dept: PAIN MANAGEMENT | Age: 32
End: 2022-08-01
Payer: MEDICARE

## 2022-08-01 VITALS
BODY MASS INDEX: 45.54 KG/M2 | WEIGHT: 257 LBS | DIASTOLIC BLOOD PRESSURE: 84 MMHG | SYSTOLIC BLOOD PRESSURE: 120 MMHG | HEIGHT: 63 IN | OXYGEN SATURATION: 97 % | HEART RATE: 89 BPM

## 2022-08-01 DIAGNOSIS — M54.42 CHRONIC BILATERAL LOW BACK PAIN WITH BILATERAL SCIATICA: Primary | ICD-10-CM

## 2022-08-01 DIAGNOSIS — M54.41 CHRONIC BILATERAL LOW BACK PAIN WITH BILATERAL SCIATICA: Primary | ICD-10-CM

## 2022-08-01 DIAGNOSIS — G89.29 CHRONIC BILATERAL LOW BACK PAIN WITH BILATERAL SCIATICA: Primary | ICD-10-CM

## 2022-08-01 DIAGNOSIS — M47.816 LUMBAR FACET JOINT SYNDROME: ICD-10-CM

## 2022-08-01 PROCEDURE — 1036F TOBACCO NON-USER: CPT | Performed by: NURSE PRACTITIONER

## 2022-08-01 PROCEDURE — G8417 CALC BMI ABV UP PARAM F/U: HCPCS | Performed by: NURSE PRACTITIONER

## 2022-08-01 PROCEDURE — G8427 DOCREV CUR MEDS BY ELIG CLIN: HCPCS | Performed by: NURSE PRACTITIONER

## 2022-08-01 PROCEDURE — 99213 OFFICE O/P EST LOW 20 MIN: CPT | Performed by: NURSE PRACTITIONER

## 2022-08-01 RX ORDER — TIZANIDINE 4 MG/1
4 TABLET ORAL 2 TIMES DAILY PRN
Qty: 60 TABLET | Refills: 2 | Status: SHIPPED | OUTPATIENT
Start: 2022-08-01 | End: 2022-10-31 | Stop reason: SDUPTHER

## 2022-08-01 RX ORDER — VENLAFAXINE 37.5 MG/1
TABLET ORAL
COMMUNITY
Start: 2022-07-07

## 2022-08-01 RX ORDER — NAPROXEN 500 MG/1
TABLET ORAL
COMMUNITY
Start: 2022-06-29 | End: 2022-10-27

## 2022-08-01 RX ORDER — BACLOFEN 10 MG/1
TABLET ORAL
Qty: 30 TABLET | Refills: 0 | Status: CANCELLED | OUTPATIENT
Start: 2022-08-01

## 2022-08-01 RX ORDER — TRIAMCINOLONE ACETONIDE 5 MG/G
CREAM TOPICAL
COMMUNITY
Start: 2022-06-18 | End: 2022-09-09

## 2022-08-01 RX ORDER — BUPROPION HYDROCHLORIDE 300 MG/1
TABLET ORAL
COMMUNITY
Start: 2022-06-13

## 2022-08-01 RX ORDER — CELECOXIB 200 MG/1
200 CAPSULE ORAL DAILY
Qty: 30 CAPSULE | Refills: 2 | Status: SHIPPED | OUTPATIENT
Start: 2022-08-01 | End: 2022-10-31 | Stop reason: SDUPTHER

## 2022-08-01 RX ORDER — LIDOCAINE 50 MG/G
PATCH TOPICAL
COMMUNITY
Start: 2022-06-29

## 2022-08-01 RX ORDER — MELOXICAM 15 MG/1
TABLET ORAL
Qty: 30 TABLET | Refills: 1 | Status: CANCELLED | OUTPATIENT
Start: 2022-08-01

## 2022-08-01 ASSESSMENT — ENCOUNTER SYMPTOMS
VOMITING: 0
SHORTNESS OF BREATH: 0
SORE THROAT: 0
BACK PAIN: 1
CONSTIPATION: 0
DIARRHEA: 0
WHEEZING: 0
NAUSEA: 0
COUGH: 0

## 2022-08-01 NOTE — PROGRESS NOTES
OARRS report reviewed today: yes  ER/Hospitalizations/PCP visit related to pain since last visit:yes   Any legal problems e.g. DUI etc.:No  Satisfied with current management: Yes    Lab Results   Component Value Date    LABA1C 6.3 06/29/2022     Lab Results   Component Value Date     09/28/2020       Past Medical History, Past Surgical History, Social History, Allergies and Medications reviewed and updated in EPIC as indicated    Family History reviewed and is noncontributory.            Past Medical History:   Diagnosis Date    ADHD     Anxiety     Back pain     Brain injury ([address]     Depression     Hearing loss     Learning disability     Migraine headache     Obese        Past Surgical History:   Procedure Laterality Date    OTHER SURGICAL HISTORY Bilateral 03/25/2021    lumbar DENIZ    PAIN MANAGEMENT PROCEDURE Bilateral 12/14/2020    EPIDURAL STEROID INJECTION BILATERAL L5 performed by Lashaun Vaughn MD at [address] Bilateral 1/4/2021    EPIDURAL STEROID INJECTION BILATERAL L5 performed by Lashaun Vaughn MD at [address] Bilateral 3/25/2021    LUMBAR FACET STEROID INJECTION BILATERAL L4 / 5 performed by Lashaun Vaughn MD at [facility]  04/15/2021    MID BACK CYST LESION BIOPSY EXCISION    SKIN BIOPSY N/A 4/15/2021    MID BACK CYST LESION BIOPSY EXCISION performed by Elijah Godfrey DO at [address] EXTRACTION         Allergies   Allergen Reactions    Cat Hair Extract     Dust Mite Extract     Molds & Smuts     Seasonal          Current Outpatient Medications:     buPROPion (WELLBUTRIN XL) 300 MG extended release tablet, TAKE 1 TABLET BY MOUTH IN THE MORNING, Disp: , Rfl:     lidocaine (LIDODERM) 5 %, USE 1 PATCH EXTERNALLY ONCE DAILY AS NEEDED 12 HOURS ON, 12 HOURS OFF, Disp: , Rfl:     naproxen (NAPROSYN) 500 MG tablet, , Disp: , Rfl:     venlafaxine (EFFEXOR) 37.5 MG tablet, TAKE 1 TABLET BY MOUTH IN THE MORNING FOR 7 DAYS, THEN TAKE 2 TABLETS BY MOUTH IN THE MORNING, Disp: , Rfl:     tiZANidine (ZANAFLEX) 4 MG tablet, Take 1 tablet by mouth 2 times daily as needed (spasms), Disp: 60 tablet, Rfl: 2    celecoxib (CELEBREX) 200 MG capsule, Take 1 capsule by mouth in the morning., Disp: 30 capsule, Rfl: 2    phentermine (ADIPEX-P) 37.5 MG tablet, Take 1 tablet by mouth every morning (before breakfast) for 30 days. , Disp: 30 tablet, Rfl: 0    Adalimumab (HUMIRA PEN-PSOR/UVEIT STARTER) 80 MG/0.8ML & 40MG/0.4ML PNKT, Inject 80 mg x 1 on day 1, then 40 mg SC q2wk on day 8, Disp: 3 each, Rfl: 0    Adalimumab (HUMIRA PEN) 40 MG/0.4ML PNKT, Inject 40 mg SC q2wk, Disp: 2 each, Rfl: 2    Lumateperone Tosylate 42 MG CAPS, Take 42 mg by mouth daily, Disp: , Rfl:     hydrOXYzine (ATARAX) 25 MG tablet, TAKE 1 TABLET BY MOUTH THREE TIMES DAILY AS NEEDED FOR ITCHING OR ANXIETY, Disp: , Rfl:     lurasidone (LATUDA) 60 MG TABS tablet, Take 100 mg by mouth daily , Disp: , Rfl:     triamcinolone (ARISTOCORT) 0.5 % cream, APPLY A THIN LAYER OF CREAM TOPICALLY TWICE TO THREE TIMES DAILY (Patient not taking: Reported on 8/1/2022), Disp: , Rfl:     clobetasol (TEMOVATE) 0.05 % external solution, Apply topically 2 times daily, as needed.  (Patient not taking: Reported on 8/1/2022), Disp: 50 mL, Rfl: 2    buPROPion (WELLBUTRIN XL) 150 MG extended release tablet, , Disp: , Rfl:     ketoconazole (NIZORAL) 2 % shampoo, Apply 3-4 times weekly to scalp, leave on for five minutes prior to washing off (Patient not taking: Reported on 8/1/2022), Disp: 120 mL, Rfl: 2    Family History   Problem Relation Age of Onset    Diabetes Mother     Heart Disease Father     Diabetes Father     Diabetes Maternal Grandmother     Diabetes Maternal Grandfather     Diabetes Paternal Grandmother     Diabetes Paternal Grandfather     Other Sister         sepsis in blood stream and pneumonia    Alcohol Abuse Brother     Drug Abuse Brother     Alcohol Abuse Brother     Drug Abuse Brother     Breast Cancer Neg Hx     Colon Cancer Neg Hx     Ovarian Cancer Neg Hx        Social History     Socioeconomic History    Marital status: Single     Spouse name: Not on file    Number of children: Not on file    Years of education: Not on file    Highest education level: Not on file   Occupational History    Not on file   Tobacco Use    Smoking status: Former     Types: Cigarettes     Quit date: 2016     Years since quittin.5    Smokeless tobacco: Never   Vaping Use    Vaping Use: Former   Substance and Sexual Activity    Alcohol use: Never     Alcohol/week: 0.0 standard drinks    Drug use: No    Sexual activity: Yes     Partners: Male   Other Topics Concern    Not on file   Social History Narrative    Not on file     Social Determinants of Health     Financial Resource Strain: Low Risk     Difficulty of Paying Living Expenses: Not hard at all   Food Insecurity: No Food Insecurity    Worried About Running Out of Food in the Last Year: Never true    Ran Out of Food in the Last Year: Never true   Transportation Needs: Not on file   Physical Activity: Not on file   Stress: Not on file   Social Connections: Not on file   Intimate Partner Violence: Not At Risk    Fear of Current or Ex-Partner: No    Emotionally Abused: No    Physically Abused: No    Sexually Abused: No   Housing Stability: Not on file       Review of Systems:  Review of Systems   Constitutional: Negative for chills and fever. HENT:  Negative for congestion and sore throat. Cardiovascular:  Negative for chest pain. Respiratory:  Negative for cough, shortness of breath and wheezing. Musculoskeletal:  Positive for back pain. Gastrointestinal:  Negative for constipation, diarrhea, nausea and vomiting. Neurological:  Negative for numbness and paresthesias.      Physical Exam:  /84   Pulse 89   Ht 5' 3\" (1.6 m)   Wt 257 lb (116.6 kg)   SpO2 97%   BMI 45.53 kg/m²     Physical Exam  Cardiovascular:      Rate and Rhythm: Normal rate. Pulmonary:      Effort: Pulmonary effort is normal.   Musculoskeletal:         General: Normal range of motion. Skin:     General: Skin is warm and dry. Neurological:      Mental Status: She is alert and oriented to person, place, and time. Assessment:  Problem List Items Addressed This Visit       Chronic bilateral low back pain with bilateral sciatica - Primary    Relevant Medications    buPROPion (WELLBUTRIN XL) 300 MG extended release tablet    naproxen (NAPROSYN) 500 MG tablet    venlafaxine (EFFEXOR) 37.5 MG tablet    tiZANidine (ZANAFLEX) 4 MG tablet    celecoxib (CELEBREX) 200 MG capsule    Other Relevant Orders    Ambulatory referral to Neurosurgery    Lumbar facet joint syndrome    Relevant Medications    naproxen (NAPROSYN) 500 MG tablet    tiZANidine (ZANAFLEX) 4 MG tablet    celecoxib (CELEBREX) 200 MG capsule    Other Relevant Orders    Ambulatory referral to Neurosurgery          Treatment Plan:    Referral to NS for implant consult. Msg to [employer] rep to update  baclofen and mobic d/t not helping will try celebrex and tizanidine   Follow up appointment made for 3 months for med refill     I have reviewed the chief complaint and history of present illness (including ROS and PFSH) and vital documentation by my staff and I agree with their documentation and have added where applicable.

## 2022-09-09 ENCOUNTER — OFFICE VISIT (OUTPATIENT)
Dept: DERMATOLOGY | Age: 32
End: 2022-09-09
Payer: MEDICARE

## 2022-09-09 VITALS
HEART RATE: 94 BPM | TEMPERATURE: 98.9 F | HEIGHT: 63 IN | OXYGEN SATURATION: 98 % | SYSTOLIC BLOOD PRESSURE: 117 MMHG | BODY MASS INDEX: 44.3 KG/M2 | DIASTOLIC BLOOD PRESSURE: 82 MMHG | WEIGHT: 250 LBS

## 2022-09-09 DIAGNOSIS — L40.50 PSORIATIC ARTHRITIS (HCC): ICD-10-CM

## 2022-09-09 DIAGNOSIS — L40.9 PSORIASIS: Primary | ICD-10-CM

## 2022-09-09 PROCEDURE — G8417 CALC BMI ABV UP PARAM F/U: HCPCS | Performed by: PHYSICIAN ASSISTANT

## 2022-09-09 PROCEDURE — G8427 DOCREV CUR MEDS BY ELIG CLIN: HCPCS | Performed by: PHYSICIAN ASSISTANT

## 2022-09-09 PROCEDURE — 1036F TOBACCO NON-USER: CPT | Performed by: PHYSICIAN ASSISTANT

## 2022-09-09 PROCEDURE — 99214 OFFICE O/P EST MOD 30 MIN: CPT | Performed by: PHYSICIAN ASSISTANT

## 2022-09-09 RX ORDER — CLOBETASOL PROPIONATE 0.46 MG/ML
SOLUTION TOPICAL
Qty: 50 ML | Refills: 2 | Status: SHIPPED | OUTPATIENT
Start: 2022-09-09

## 2022-09-09 RX ORDER — CLOBETASOL PROPIONATE 0.5 MG/G
CREAM TOPICAL
Qty: 60 G | Refills: 3 | Status: SHIPPED | OUTPATIENT
Start: 2022-09-09

## 2022-09-09 RX ORDER — PSEUDOEPHED/ACETAMINOPH/DIPHEN 30MG-500MG
TABLET ORAL
COMMUNITY
Start: 2022-06-29 | End: 2022-10-27

## 2022-09-09 RX ORDER — TIZANIDINE 4 MG/1
TABLET ORAL
COMMUNITY
Start: 2022-08-01

## 2022-09-09 NOTE — PROGRESS NOTES
Dermatology Patient Note  [address]  [facility]  Dept: [phone]  Dept Fax: [phone]      VISITDATE: 9/9/2022   REFERRING PROVIDER: No ref. provider found      Damien Cosme is a 29 y.o. female  who presents today in the office for:    Follow-up (Pt states her skin is doing well, seeing improvement)      HISTORY OF PRESENT ILLNESS:  As above. Scalp is much better, with just a small area, on the left mastoid region, remaining. Using Humira, clobetasol cream, and clobetasol solution. No signs of infection, or hospitalizations. Pt states that she still has A. M. hand stiffness, but no tenderness to palpation or swelling of digits now. MEDICAL PROBLEMS:  Patient Active Problem List    Diagnosis Date Noted    Controlled type 2 diabetes mellitus without complication, without long-term current use of insulin ([facility] 06/29/2022     Priority: Medium    Spinal cord stimulator status 03/21/2022    DDD (degenerative disc disease), lumbar 11/23/2021    Lumbar facet joint syndrome 10/19/2021    Arthritis 04/05/2021    Knee pain 04/05/2021     Right knee, ongoing. Left lumbar radiculitis 01/04/2021    Lumbar disc herniation 11/24/2020    Chronic bilateral low back pain with bilateral sciatica 12/04/2019    Bipolar 1 disorder, depressed ([address] 10/22/2019    Encounter for Nexplanon removal 08/15/2018    Encounter for initial prescription of injectable contraceptive 08/15/2018    Back pain, chronic 01/19/2018    Attention or concentration deficit 12/06/2016    Irregular bleeding 05/03/2016       CURRENT MEDICATIONS:   Current Outpatient Medications   Medication Sig Dispense Refill    tiZANidine (ZANAFLEX) 4 MG tablet TAKE 1 TABLET BY MOUTH TWICE DAILY AS NEEDED FOR SPAMS      clobetasol (TEMOVATE) 0.05 % cream Apply topically 2 times daily. , as needed, for itching or scaling.  60 g 3    clobetasol (TEMOVATE) 0.05 % external solution Apply topically 2 times daily, as needed. 50 mL 2    buPROPion (WELLBUTRIN XL) 300 MG extended release tablet TAKE 1 TABLET BY MOUTH IN THE MORNING      venlafaxine (EFFEXOR) 37.5 MG tablet TAKE 1 TABLET BY MOUTH IN THE MORNING FOR 7 DAYS, THEN TAKE 2 TABLETS BY MOUTH IN THE MORNING      celecoxib (CELEBREX) 200 MG capsule Take 1 capsule by mouth in the morning. 30 capsule 2    Adalimumab (HUMIRA PEN-PSOR/UVEIT STARTER) 80 MG/0.8ML & 40MG/0.4ML PNKT Inject 80 mg x 1 on day 1, then 40 mg SC q2wk on day 8 3 each 0    Adalimumab (HUMIRA PEN) 40 MG/0.4ML PNKT Inject 40 mg SC q2wk 2 each 2    hydrOXYzine (ATARAX) 25 MG tablet TAKE 1 TABLET BY MOUTH THREE TIMES DAILY AS NEEDED FOR ITCHING OR ANXIETY      ACETAMINOPHEN EXTRA STRENGTH 500 MG tablet TAKE 2 TABLETS BY MOUTH EVERY 6 HOURS AS NEEDED      lidocaine (LIDODERM) 5 % USE 1 PATCH EXTERNALLY ONCE DAILY AS NEEDED 12 HOURS ON, 12 HOURS OFF (Patient not taking: Reported on 2022)      naproxen (NAPROSYN) 500 MG tablet  (Patient not taking: Reported on 2022)      Lumateperone Tosylate 42 MG CAPS Take 42 mg by mouth daily      buPROPion (WELLBUTRIN XL) 150 MG extended release tablet       lurasidone (LATUDA) 60 MG TABS tablet Take 100 mg by mouth daily  (Patient not taking: Reported on 2022)      ketoconazole (NIZORAL) 2 % shampoo Apply 3-4 times weekly to scalp, leave on for five minutes prior to washing off (Patient not taking: No sig reported) 120 mL 2     No current facility-administered medications for this visit.        ALLERGIES:   Allergies   Allergen Reactions    Cat Hair Extract     Dust Mite Extract     Molds & Smuts     Seasonal        SOCIAL HISTORY:  Social History     Tobacco Use    Smoking status: Former     Types: Cigarettes     Quit date: 2016     Years since quittin.6    Smokeless tobacco: Never   Substance Use Topics    Alcohol use: Never     Alcohol/week: 0.0 standard drinks       Pertinent ROS:  Review of Systems  Skin: Denies any new changing, growing or bleeding lesions or rashes except as described in the HPI   Constitutional: Denies fevers, chills, and malaise. PHYSICAL EXAM:   /82   Pulse 94   Temp 98.9 °F (37.2 °C) (Skin)   Ht 5' 3\" (1.6 m)   Wt 250 lb (113.4 kg)   SpO2 98%   BMI 44.29 kg/m²     The patient is generally well appearing, well nourished, alert and conversational. Affect is normal.    Cutaneous Exam:  Physical Exam  Sun exposed + limited LEs: Head/face,neck, both arms, digits and/or nails and legs visible with pants/shorts and shoes/socks on was examined. Facial covering was not removed during examination. Diagnoses/exam findings/medical history pertinent to this visit are listed below:    Assessment:   Diagnosis Orders   1. Psoriasis  clobetasol (TEMOVATE) 0.05 % cream    clobetasol (TEMOVATE) 0.05 % external solution      2. Psoriatic arthritis ([address]             Plan:  1. Psoriasis  - chronic illness, responding to treatment but not yet at goal   - Biologic continuation: Patient understands the increased risk of infection and potential increased risk of malignancy as a result of immunosuppression. Patient denies occurrence of infections aside from common colds or gastroenteritis. Patient was counseled to call if he/she develops any symptoms concerning for infection. Patient understands the need for continued lab monitoring while on the medication. Patient will not receive live vaccines while on the medication.    - Continue Humira  - clobetasol (TEMOVATE) 0.05 % cream; Apply topically 2 times daily. , as needed, for itching or scaling. Dispense: 60 g; Refill: 3  - clobetasol (TEMOVATE) 0.05 % external solution; Apply topically 2 times daily, as needed. Dispense: 50 mL; Refill: 2    2.  Psoriatic arthritis ([address]  - Continue Humira      RTC 6M    Future Appointments   Date Time Provider Sajan Pardo   9/19/2022 11:30 AM DO Ashleigh Peter Neuro [facility]   10/27/2022 1:30 PM Zac Rodriges MD [facility]   10/31/2022  1:00 PM Carmella Phlegm, APRN - CNP [facility]   3/9/2023  2:00 PM Alexia Mix PA-C  derm [facility]         There are no Patient Instructions on file for this visit.       Electronically signed by Alexia Mix PA-C on 9/9/22 at 5:48 PM EDT

## 2022-09-19 ENCOUNTER — OFFICE VISIT (OUTPATIENT)
Dept: NEUROSURGERY | Age: 32
End: 2022-09-19
Payer: MEDICARE

## 2022-09-19 VITALS
SYSTOLIC BLOOD PRESSURE: 110 MMHG | OXYGEN SATURATION: 96 % | HEIGHT: 63 IN | HEART RATE: 116 BPM | WEIGHT: 247.4 LBS | DIASTOLIC BLOOD PRESSURE: 64 MMHG | BODY MASS INDEX: 43.84 KG/M2

## 2022-09-19 DIAGNOSIS — M47.26 OTHER SPONDYLOSIS WITH RADICULOPATHY, LUMBAR REGION: ICD-10-CM

## 2022-09-19 DIAGNOSIS — Z01.818 PREOPERATIVE TESTING: Primary | ICD-10-CM

## 2022-09-19 PROCEDURE — 1036F TOBACCO NON-USER: CPT | Performed by: NEUROLOGICAL SURGERY

## 2022-09-19 PROCEDURE — 99204 OFFICE O/P NEW MOD 45 MIN: CPT | Performed by: NEUROLOGICAL SURGERY

## 2022-09-19 PROCEDURE — G8417 CALC BMI ABV UP PARAM F/U: HCPCS | Performed by: NEUROLOGICAL SURGERY

## 2022-09-19 PROCEDURE — G8427 DOCREV CUR MEDS BY ELIG CLIN: HCPCS | Performed by: NEUROLOGICAL SURGERY

## 2022-09-19 RX ORDER — PHENOL 1.4 %
30 AEROSOL, SPRAY (ML) MUCOUS MEMBRANE
COMMUNITY

## 2022-09-19 RX ORDER — PHENTERMINE HYDROCHLORIDE 37.5 MG/1
37.5 CAPSULE ORAL EVERY MORNING
COMMUNITY

## 2022-09-19 NOTE — PROGRESS NOTES
[address] [facility]  [address] [place] [address], [address] [facility] [address]  Dept: [phone]    Patient:  Luciano Herrera  YOB: 1990  Date: 9/19/22    The patient is a 29 y.o. female who presents today for consult of the following problems:     Chief Complaint   Patient presents with    Neurologic Problem     Chronic Back Pain              HPI:     Luciano Herrera is a 29 y.o. female on whom neurosurgical consultation was requested by Dion Watson MD for management of chronic lumbar pain with bilateral radiculopathy. Patient still with chronic axial issues as well as bilateral radicular symptoms for some time is been following with pain management. She recently underwent a spinal cord stimulator trial with greater than 90% improvement in both the axial pain as well as radicular S1 distribution pain in the hamstring region. The trial lasted a total of 7 days with significant provement at approximate the third or fourth day where there was a dialing end of the program and that appear to give her functional relief without overstimulation. She states that for the total period of 4 days she had significant provement that was not positionally correlated. She also continued to have significant provement for approximately 2 weeks afterward with no trial in place.       History:     Past Medical History:   Diagnosis Date    ADHD     Anxiety     Back pain     Brain injury ([address]     Depression     Hearing loss     Learning disability     Migraine headache     Obese      Past Surgical History:   Procedure Laterality Date    OTHER SURGICAL HISTORY Bilateral 03/25/2021    lumbar DENIZ    PAIN MANAGEMENT PROCEDURE Bilateral 12/14/2020    EPIDURAL STEROID INJECTION BILATERAL L5 performed by Ayla Zaman MD at [address] Bilateral 1/4/2021    EPIDURAL STEROID INJECTION BILATERAL L5 performed by Edinson Du MD at [facility] Bilateral 3/25/2021    LUMBAR FACET STEROID INJECTION BILATERAL L4 / 5 performed by Edinson Du MD at [facility]  04/15/2021    MID BACK CYST LESION BIOPSY EXCISION    SKIN BIOPSY N/A 4/15/2021    MID BACK CYST LESION BIOPSY EXCISION performed by Diana Brush DO at [address] EXTRACTION       Family History   Problem Relation Age of Onset    Diabetes Mother     Heart Disease Father     Diabetes Father     Diabetes Maternal Grandmother     Diabetes Maternal Grandfather     Diabetes Paternal Grandmother     Diabetes Paternal Grandfather     Other Sister         sepsis in blood stream and pneumonia    Alcohol Abuse Brother     Drug Abuse Brother     Alcohol Abuse Brother     Drug Abuse Brother     Breast Cancer Neg Hx     Colon Cancer Neg Hx     Ovarian Cancer Neg Hx      Current Outpatient Medications on File Prior to Visit   Medication Sig Dispense Refill    ACETAMINOPHEN EXTRA STRENGTH 500 MG tablet TAKE 2 TABLETS BY MOUTH EVERY 6 HOURS AS NEEDED      tiZANidine (ZANAFLEX) 4 MG tablet TAKE 1 TABLET BY MOUTH TWICE DAILY AS NEEDED FOR SPAMS      clobetasol (TEMOVATE) 0.05 % cream Apply topically 2 times daily. , as needed, for itching or scaling. 60 g 3    clobetasol (TEMOVATE) 0.05 % external solution Apply topically 2 times daily, as needed.  50 mL 2    buPROPion (WELLBUTRIN XL) 300 MG extended release tablet TAKE 1 TABLET BY MOUTH IN THE MORNING      venlafaxine (EFFEXOR) 37.5 MG tablet TAKE 1 TABLET BY MOUTH IN THE MORNING FOR 7 DAYS, THEN TAKE 2 TABLETS BY MOUTH IN THE MORNING      Adalimumab (HUMIRA PEN-PSOR/UVEIT STARTER) 80 MG/0.8ML & 40MG/0.4ML PNKT Inject 80 mg x 1 on day 1, then 40 mg SC q2wk on day 8 3 each 0    Adalimumab (HUMIRA PEN) 40 MG/0.4ML PNKT Inject 40 mg SC q2wk 2 each 2    hydrOXYzine (ATARAX) 25 MG tablet TAKE 1 TABLET BY MOUTH THREE TIMES DAILY AS NEEDED FOR capillary refill  Neuro    Mentation  Appropriate affect   oriented    Cranial Nerves:   Pupils equal and reactive to light  Extraocular motion intact  Face symmetric  No dysarthria  v1-3 sensation symmetric, masseter tone symmetric  Hearing symmetric and intact to finger rub    Sensation:   intact    Motor  L deltoid 5/5; R deltoid 5/5  L biceps 5/5; R biceps 5/5  L triceps 5/5; R triceps 5/5  L wrist extension 5/5; R wrist extension 5/5  L intrinsics 5/5; R intrinsics 5/5     L iliopsoas 5/5 , R iliopsoas 5/5  L quadriceps 5/5; R quadriceps 5/5  L Dorsiflexion 5/5; R dorsiflexion 5/5  L Plantarflexion 5/5; R plantarflexion 5/5  L EHL 5/5; R EHL 5/5    Reflexes  L Brachioradialis 2+/4; R brachioradialis 2+/4  L Biceps 2+/4; R Biceps 2+/4  L Triceps 2+/4; R Triceps 2+/4  L Patellar 2+/4: R Patellar 2+/4  L Achilles 2+/4; R Achilles 2+/4    hoffmans L: neg  hoffmans R: neg  Clonus L: negwil  Clonus R: neg  Babinski L: up  Babinski R; up    Studies Review:     MRI L - 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 Preoperative testing    2. Other spondylosis with radiculopathy, lumbar region          Plan: mri T to eval stenosis prior to consideration of paddle stim. Pt apprehensive based on invasiveness of paddle stimulator & concern of complications. MRI T noncontrast for preoperative evaluation to rule out stenosis. Pt interested in percutaneous option as well. Followup: No follow-ups on file. Prescriptions Ordered:  No orders of the defined types were placed in this encounter. Orders Placed:  Orders Placed This Encounter   Procedures    MRI THORACIC SPINE WO CONTRAST     Standing Status:   Future     Standing Expiration Date:   9/19/2023     Order Specific Question:   Reason for exam:     Answer:   eval preop     Order Specific Question:   What is the sedation requirement?      Answer:   None        Electronically signed by Keith Hatch DO on 9/19/2022 at 12:09 PM    Please note that this chart was generated using voice recognition Dragon dictation software. Although every effort was made to ensure the accuracy of this automated transcription, some errors in transcription may have occurred.

## 2022-09-27 ENCOUNTER — HOSPITAL ENCOUNTER (OUTPATIENT)
Dept: MRI IMAGING | Facility: CLINIC | Age: 32
Discharge: HOME | End: 2022-09-29
Payer: MEDICARE

## 2022-09-27 DIAGNOSIS — Z01.818 PREOPERATIVE TESTING: ICD-10-CM

## 2022-09-27 PROCEDURE — 72146 MRI CHEST SPINE W/O DYE: CPT

## 2022-10-03 ENCOUNTER — TELEPHONE (OUTPATIENT)
Dept: OBGYN CLINIC | Age: 32
End: 2022-10-03

## 2022-10-26 ENCOUNTER — TELEMEDICINE (OUTPATIENT)
Dept: NEUROSURGERY | Age: 32
End: 2022-10-26
Payer: MEDICARE

## 2022-10-26 DIAGNOSIS — M47.26 OTHER SPONDYLOSIS WITH RADICULOPATHY, LUMBAR REGION: Primary | ICD-10-CM

## 2022-10-26 PROCEDURE — G8428 CUR MEDS NOT DOCUMENT: HCPCS | Performed by: NEUROLOGICAL SURGERY

## 2022-10-26 PROCEDURE — 99214 OFFICE O/P EST MOD 30 MIN: CPT | Performed by: NEUROLOGICAL SURGERY

## 2022-10-26 NOTE — PROGRESS NOTES
10/26/2022    TELEHEALTH EVALUATION -- Audio/Visual (During ALOEX-34 public health emergency)    HPI:    Alverto Handy (:  1990) has requested an audio/video evaluation for the following concern(s):    Alverto Handy is a 29 y.o. female on whom neurosurgical consultation was requested by Hina Pérez MD for management of lumbar spondylosis with radiculopathy. Patient still with back and leg pain persistently and failed conserative measures. Successful trial with 90% improvement in symptoms. No changes in symptoms from last visit. .    Review of Systems    Prior to Visit Medications    Medication Sig Taking? Authorizing Provider   Melatonin 10 MG TABS Take 30 mg by mouth  Historical Provider, MD   phentermine 37.5 MG capsule Take 37.5 mg by mouth every morning. Historical Provider, MD   ACETAMINOPHEN EXTRA STRENGTH 500 MG tablet TAKE 2 TABLETS BY MOUTH EVERY 6 HOURS AS NEEDED  Historical Provider, MD   tiZANidine (Neville Dauer) 4 MG tablet TAKE 1 TABLET BY MOUTH TWICE DAILY AS NEEDED FOR SPAMS  Historical Provider, MD   clobetasol (TEMOVATE) 0.05 % cream Apply topically 2 times daily. , as needed, for itching or scaling. Kaitlyn Hendrix PA-C   clobetasol (TEMOVATE) 0.05 % external solution Apply topically 2 times daily, as needed. Kaitlyn Hendrix PA-C   buPROPion (WELLBUTRIN XL) 300 MG extended release tablet TAKE 1 TABLET BY MOUTH IN THE MORNING  Historical Provider, MD   lidocaine (LIDODERM) 5 % USE 1 PATCH EXTERNALLY ONCE DAILY AS NEEDED 12 HOURS ON, 12 HOURS OFF  Patient not taking: No sig reported  Historical Provider, MD   naproxen (NAPROSYN) 500 MG tablet   Historical Provider, MD   venlafaxine (EFFEXOR) 37.5 MG tablet TAKE 1 TABLET BY MOUTH IN THE MORNING FOR 7 DAYS, THEN TAKE 2 TABLETS BY MOUTH IN THE MORNING  Historical Provider, MD   celecoxib (CELEBREX) 200 MG capsule Take 1 capsule by mouth in the morning.   Polo Null, APRN - CNP   Adalimumab (HUMIRA PEN-PSOR/UVEIT STARTER) 80 MG/0.8ML & 40MG/0.4ML PNKT Inject 80 mg x 1 on day 1, then 40 mg SC q2wk on day 8  Cisco Alexis PA-C   Adalimumab (HUMIRA PEN) 40 MG/0.4ML PNKT Inject 40 mg SC q2wk  Won Perry PA-C   Lumateperone Tosylate 42 MG CAPS Take 42 mg by mouth daily  Historical Provider, MD   hydrOXYzine (ATARAX) 25 MG tablet TAKE 1 TABLET BY MOUTH THREE TIMES DAILY AS NEEDED FOR ITCHING OR ANXIETY  Historical Provider, MD   buPROPion (WELLBUTRIN XL) 150 MG extended release tablet   Historical Provider, MD   lurasidone (LATUDA) 60 MG TABS tablet Take 100 mg by mouth daily   Patient not taking: No sig reported  Historical Provider, MD   ketoconazole (NIZORAL) 2 % shampoo Apply 3-4 times weekly to scalp, leave on for five minutes prior to washing off  Patient not taking: No sig reported  Agnieszka Cartagena MD       Social History     Tobacco Use    Smoking status: Former     Types: Cigarettes     Quit date: 2016     Years since quittin.8    Smokeless tobacco: Never   Vaping Use    Vaping Use: Former   Substance Use Topics    Alcohol use: Never     Alcohol/week: 0.0 standard drinks    Drug use: No        Allergies   Allergen Reactions    Cat Hair Extract     Dust Mite Extract     Molds & Smuts     Seasonal    ,   Past Medical History:   Diagnosis Date    ADHD     Anxiety     Back pain     Brain injury ([facility] [address]     Depression     Hearing loss     Learning disability     Migraine headache     Obese    ,   Past Surgical History:   Procedure Laterality Date    OTHER SURGICAL HISTORY Bilateral 2021    lumbar DENIZ    PAIN MANAGEMENT PROCEDURE Bilateral 2020    EPIDURAL STEROID INJECTION BILATERAL L5 performed by Amrik Ramirez MD at [address] Bilateral 2021    EPIDURAL STEROID INJECTION BILATERAL L5 performed by Amrik Ramirez MD at [address] Bilateral 3/25/2021    LUMBAR FACET STEROID INJECTION BILATERAL L4 / 5 performed by Amrik Ramirez MD at [facility]  04/15/2021    [facility] BACK CYST LESION BIOPSY EXCISION    SKIN BIOPSY N/A 4/15/2021    MID BACK CYST LESION BIOPSY EXCISION performed by Albert Carrion DO at [address] EXTRACTION     ,   Social History     Tobacco Use    Smoking status: Former     Types: Cigarettes     Quit date: 2016     Years since quittin.8    Smokeless tobacco: Never   Vaping Use    Vaping Use: Former   Substance Use Topics    Alcohol use: Never     Alcohol/week: 0.0 standard drinks    Drug use: No   ,   Family History   Problem Relation Age of Onset    Diabetes Mother     Heart Disease Father     Diabetes Father     Diabetes Maternal Grandmother     Diabetes Maternal Grandfather     Diabetes Paternal Grandmother     Diabetes Paternal Grandfather     Other Sister         sepsis in blood stream and pneumonia    Alcohol Abuse Brother     Drug Abuse Brother     Alcohol Abuse Brother     Drug Abuse Brother     Breast Cancer Neg Hx     Colon Cancer Neg Hx     Ovarian Cancer Neg Hx        PHYSICAL EXAMINATION:  [ INSTRUCTIONS:  \"[x]\" Indicates a positive item  \"[]\" Indicates a negative item  -- DELETE ALL ITEMS NOT EXAMINED]  Vital Signs: (As obtained by patient/caregiver or practitioner observation)    Blood pressure-  Heart rate-    Respiratory rate-    Temperature-  Pulse oximetry-     Constitutional: [x] Appears well-developed and well-nourished [x] No apparent distress      [] Abnormal-   Mental status  [x] Alert and awake  [x] Oriented to person/place/time [x]Able to follow commands      Eyes:  EOM    [x]  Normal  [] Abnormal-  Sclera  [x]  Normal  [] Abnormal -         Discharge []  None visible  [] Abnormal -    HENT:   [x] Normocephalic, atraumatic.   [] Abnormal   [x] Mouth/Throat: Mucous membranes are moist.     External Ears [x] Normal  [] Abnormal-     Neck: [x] No visualized mass     Pulmonary/Chest: [x] Respiratory effort normal.  [x] No visualized signs of difficulty breathing or respiratory distress [] Abnormal-      Musculoskeletal:   [x] Normal gait with no signs of ataxia         [x] Normal range of motion of neck        [] Abnormal-       Neurological:        [x] No Facial Asymmetry (Cranial nerve 7 motor function) (limited exam to video visit)          [x] No gaze palsy        [] Abnormal-         Skin:        [x] No significant exanthematous lesions or discoloration noted on facial skin         [] Abnormal-            Psychiatric:       [x] Normal Affect [x] No Hallucinations        [] Abnormal-     Other pertinent observable physical exam findings-     ASSESSMENT/PLAN:  Lumbar spondylosis with radiculopathy  No stenosis on mri T & will proceed with thoracic SCS implant. Reached out dermatology NP Melanie Herrera regarding holding humira 1-2wks pre and postop        Maira [place] Mater, was evaluated through a synchronous (real-time) audio-video encounter. The patient (or guardian if applicable) is aware that this is a billable service, which includes applicable co-pays. This Virtual Visit was conducted with patient's (and/or legal guardian's) consent. The visit was conducted pursuant to the emergency declaration under the [address] [place] [address], [address] [place] [address] waiver authority and the [employer] General Act. Patient identification was verified, and a caregiver was present when appropriate. The patient was located at Home: [address] [place] [address]. Provider was located at [facility] (Appt Dept): [address] [place] [address],  [address]  [facility] [address] [place] [address]. Total time spent on this encounter: Not billed by time    --Jose Clemente DO on 10/26/2022 at 1:15 PM    An electronic signature was used to authenticate this note.

## 2022-10-27 ENCOUNTER — OFFICE VISIT (OUTPATIENT)
Dept: FAMILY MEDICINE CLINIC | Age: 32
End: 2022-10-27
Payer: MEDICARE

## 2022-10-27 VITALS
OXYGEN SATURATION: 98 % | BODY MASS INDEX: 43.22 KG/M2 | DIASTOLIC BLOOD PRESSURE: 74 MMHG | HEART RATE: 95 BPM | WEIGHT: 244 LBS | SYSTOLIC BLOOD PRESSURE: 111 MMHG | TEMPERATURE: 98.1 F

## 2022-10-27 DIAGNOSIS — R07.89 CHEST WALL PAIN: ICD-10-CM

## 2022-10-27 DIAGNOSIS — K21.9 GASTROESOPHAGEAL REFLUX DISEASE, UNSPECIFIED WHETHER ESOPHAGITIS PRESENT: ICD-10-CM

## 2022-10-27 DIAGNOSIS — E66.01 MORBID OBESITY WITH BMI OF 45.0-49.9, ADULT (HCC): ICD-10-CM

## 2022-10-27 DIAGNOSIS — R73.03 PREDIABETES: Primary | ICD-10-CM

## 2022-10-27 PROBLEM — E11.9 CONTROLLED TYPE 2 DIABETES MELLITUS WITHOUT COMPLICATION, WITHOUT LONG-TERM CURRENT USE OF INSULIN (HCC): Status: RESOLVED | Noted: 2022-06-29 | Resolved: 2022-10-27

## 2022-10-27 LAB — HBA1C MFR BLD: 5.9 %

## 2022-10-27 PROCEDURE — 99214 OFFICE O/P EST MOD 30 MIN: CPT | Performed by: FAMILY MEDICINE

## 2022-10-27 PROCEDURE — 83036 HEMOGLOBIN GLYCOSYLATED A1C: CPT | Performed by: FAMILY MEDICINE

## 2022-10-27 RX ORDER — SEMAGLUTIDE 0.25 MG/.5ML
0.25 INJECTION, SOLUTION SUBCUTANEOUS
Qty: 2 ML | Refills: 0 | Status: SHIPPED | OUTPATIENT
Start: 2022-10-27

## 2022-10-27 ASSESSMENT — PATIENT HEALTH QUESTIONNAIRE - PHQ9
4. FEELING TIRED OR HAVING LITTLE ENERGY: 0
10. IF YOU CHECKED OFF ANY PROBLEMS, HOW DIFFICULT HAVE THESE PROBLEMS MADE IT FOR YOU TO DO YOUR WORK, TAKE CARE OF THINGS AT HOME, OR GET ALONG WITH OTHER PEOPLE: 0
3. TROUBLE FALLING OR STAYING ASLEEP: 0
SUM OF ALL RESPONSES TO PHQ QUESTIONS 1-9: 0
2. FEELING DOWN, DEPRESSED OR HOPELESS: 0
SUM OF ALL RESPONSES TO PHQ QUESTIONS 1-9: 0
1. LITTLE INTEREST OR PLEASURE IN DOING THINGS: 0
SUM OF ALL RESPONSES TO PHQ9 QUESTIONS 1 & 2: 0
6. FEELING BAD ABOUT YOURSELF - OR THAT YOU ARE A FAILURE OR HAVE LET YOURSELF OR YOUR FAMILY DOWN: 0
SUM OF ALL RESPONSES TO PHQ QUESTIONS 1-9: 0
SUM OF ALL RESPONSES TO PHQ QUESTIONS 1-9: 0
7. TROUBLE CONCENTRATING ON THINGS, SUCH AS READING THE NEWSPAPER OR WATCHING TELEVISION: 0
8. MOVING OR SPEAKING SO SLOWLY THAT OTHER PEOPLE COULD HAVE NOTICED. OR THE OPPOSITE, BEING SO FIGETY OR RESTLESS THAT YOU HAVE BEEN MOVING AROUND A LOT MORE THAN USUAL: 0
9. THOUGHTS THAT YOU WOULD BE BETTER OFF DEAD, OR OF HURTING YOURSELF: 0
5. POOR APPETITE OR OVEREATING: 0

## 2022-10-27 NOTE — PROGRESS NOTES
Diabetes Maternal Grandfather     Diabetes Paternal Grandmother     Diabetes Paternal Grandfather     Other Sister         sepsis in blood stream and pneumonia    Alcohol Abuse Brother     Drug Abuse Brother     Alcohol Abuse Brother     Drug Abuse Brother     Breast Cancer Neg Hx     Colon Cancer Neg Hx     Ovarian Cancer Neg Hx      Current Outpatient Medications   Medication Sig Dispense Refill    Semaglutide-Weight Management (WEGOVY) 0.25 MG/0.5ML SOAJ SC injection Inject 0.25 mg into the skin every 7 days 2 mL 0    Melatonin 10 MG TABS Take 30 mg by mouth      tiZANidine (ZANAFLEX) 4 MG tablet TAKE 1 TABLET BY MOUTH TWICE DAILY AS NEEDED FOR SPAMS      clobetasol (TEMOVATE) 0.05 % cream Apply topically 2 times daily. , as needed, for itching or scaling. 60 g 3    clobetasol (TEMOVATE) 0.05 % external solution Apply topically 2 times daily, as needed. 50 mL 2    buPROPion (WELLBUTRIN XL) 300 MG extended release tablet TAKE 1 TABLET BY MOUTH IN THE MORNING      venlafaxine (EFFEXOR) 37.5 MG tablet TAKE 1 TABLET BY MOUTH IN THE MORNING FOR 7 DAYS, THEN TAKE 2 TABLETS BY MOUTH IN THE MORNING      Adalimumab (HUMIRA PEN) 40 MG/0.4ML PNKT Inject 40 mg SC q2wk 2 each 2    hydrOXYzine (ATARAX) 25 MG tablet TAKE 1 TABLET BY MOUTH THREE TIMES DAILY AS NEEDED FOR ITCHING OR ANXIETY      phentermine 37.5 MG capsule Take 37.5 mg by mouth every morning. (Patient not taking: Reported on 10/27/2022)      lidocaine (LIDODERM) 5 % USE 1 PATCH EXTERNALLY ONCE DAILY AS NEEDED 12 HOURS ON, 12 HOURS OFF (Patient not taking: No sig reported)      celecoxib (CELEBREX) 200 MG capsule Take 1 capsule by mouth in the morning. 30 capsule 2     No current facility-administered medications for this visit.      ALLERGIES:    Allergies   Allergen Reactions    Cat Hair Extract     Dust Mite Extract     Molds & Smuts     Seasonal        Social History     Tobacco Use    Smoking status: Former     Types: Cigarettes     Quit date: 1/1/2016 Years since quittin.8    Smokeless tobacco: Never   Substance Use Topics    Alcohol use: Never     Alcohol/week: 0.0 standard drinks      Body mass index is 43.22 kg/m². /74   Pulse 95   Temp 98.1 °F (36.7 °C)   Wt 244 lb (110.7 kg)   SpO2 98%   BMI 43.22 kg/m²     Subjective:      HPI    29 y.o. female coming today with multiple complaints. She finished Adipex and she has been losing quite well weight. She did not change her diet she did not change her exercises. A1c today is 5.9. The patient complains about chest wall pain throughout. She has been having this now for couple of weeks. She talked to the office of her gynecologist which was not there to see her but she says that she would like to know what this is. She also was diagnosed with a allergy to cow milk and other food allergies her scratch test.  She says that that she even been not eating or drinking milk or cheese she still has a lot of acid reflux on and off. She would like to be referred to a gastroenterologist.    Review of Systems   Constitutional: Negative for fever and unexpected weight change. Pertinent items are noted in HPI. Objective:   Physical Exam  Constitutional: VS (see above). General appearance: normal development, habitus and attention, no deformities. No distress. Eyes: normal conjunctiva and lids. CAV: RRR, no RMG. No edema lower extremities. Pulmo: CTA bilateral, no CWR. Skin: no rashes, lesions or ulcers. Musculoskeletal: normal gait. Nails: no clubbing or cyanosis. Discomfort with palpitation of chest wall. Psychiatric: alert and oriented to place, time and person. Normal mood and affect. Assessment:       Diagnosis Orders   1. Prediabetes  POCT glycosylated hemoglobin (Hb A1C)    Semaglutide-Weight Management (WEGOVY) 0.25 MG/0.5ML SOAJ SC injection      2. Morbid obesity with BMI of 45.0-49.9, adult (HCC)  Semaglutide-Weight Management (WEGOVY) 0.25 MG/0.5ML SOAJ SC injection      3. Gastroesophageal reflux disease, unspecified whether esophagitis present  KIM Patel MD, Gastroenterology, [facility]      4. Chest wall pain            Plan:   The patient is open to start [place] for weight loss. Discussed with her again exercise and diet. Patient's diabetes quite under control with an A1c of 5.9. The patient the patient will see gastroenterologist as requested. I did give her a printout about her costochondritis. Return in about 3 months (around 1/27/2023), or if symptoms worsen or fail to improve, for weight. Orders Placed This Encounter   Procedures    KIM Patel MD, Gastroenterology, [facility]     Referral Priority:   Routine     Referral Type:   Eval and Treat     Referral Reason:   Specialty Services Required     Referred to Provider:   Carrol Osgood, MD     Requested Specialty:   Gastroenterology     Number of Visits Requested:   1    POCT glycosylated hemoglobin (Hb A1C)     Orders Placed This Encounter   Medications    Semaglutide-Weight Management (WEGOVY) 0.25 MG/0.5ML SOAJ SC injection     Sig: Inject 0.25 mg into the skin every 7 days     Dispense:  2 mL     Refill:  0       Call or return to clinic prn if these symptoms worsen or fail to improve as anticipated. I have reviewed the instructions with the patient, answering all questions to patient's satisfaction. Maira received counseling on the following healthy behaviors: nutrition, exercise, and medication adherence  Reviewed prior labs and health maintenance. Continue current medications, diet and exercise. Discussed use, benefit, and side effects of prescribed medications. Barriers to medication compliance addressed. Patient given educational materials - see patient instructions. All patient questions answered. Patient voiced understanding.       Electronically signed by Hilary Galeana MD on 10/27/2022 at 2:03 PM       (Please note that portions of this note were completed with a voice recognition program. Efforts were made to edit the dictations but occasionally words are mis-transcribed.)

## 2022-10-28 ENCOUNTER — TELEPHONE (OUTPATIENT)
Dept: FAMILY MEDICINE CLINIC | Age: 32
End: 2022-10-28

## 2022-10-31 ENCOUNTER — OFFICE VISIT (OUTPATIENT)
Dept: PAIN MANAGEMENT | Age: 32
End: 2022-10-31
Payer: MEDICARE

## 2022-10-31 VITALS
WEIGHT: 240 LBS | BODY MASS INDEX: 42.52 KG/M2 | SYSTOLIC BLOOD PRESSURE: 104 MMHG | HEART RATE: 66 BPM | HEIGHT: 63 IN | OXYGEN SATURATION: 94 % | DIASTOLIC BLOOD PRESSURE: 74 MMHG

## 2022-10-31 DIAGNOSIS — G89.29 CHRONIC BILATERAL LOW BACK PAIN WITH BILATERAL SCIATICA: ICD-10-CM

## 2022-10-31 DIAGNOSIS — M54.16 LEFT LUMBAR RADICULITIS: Chronic | ICD-10-CM

## 2022-10-31 DIAGNOSIS — M54.42 CHRONIC BILATERAL LOW BACK PAIN WITH BILATERAL SCIATICA: ICD-10-CM

## 2022-10-31 DIAGNOSIS — M54.41 CHRONIC BILATERAL LOW BACK PAIN WITH BILATERAL SCIATICA: ICD-10-CM

## 2022-10-31 DIAGNOSIS — M47.816 LUMBAR FACET JOINT SYNDROME: Primary | ICD-10-CM

## 2022-10-31 PROCEDURE — G8484 FLU IMMUNIZE NO ADMIN: HCPCS | Performed by: NURSE PRACTITIONER

## 2022-10-31 PROCEDURE — 99214 OFFICE O/P EST MOD 30 MIN: CPT | Performed by: NURSE PRACTITIONER

## 2022-10-31 PROCEDURE — G8427 DOCREV CUR MEDS BY ELIG CLIN: HCPCS | Performed by: NURSE PRACTITIONER

## 2022-10-31 PROCEDURE — G8417 CALC BMI ABV UP PARAM F/U: HCPCS | Performed by: NURSE PRACTITIONER

## 2022-10-31 PROCEDURE — 1036F TOBACCO NON-USER: CPT | Performed by: NURSE PRACTITIONER

## 2022-10-31 RX ORDER — CELECOXIB 200 MG/1
200 CAPSULE ORAL DAILY
Qty: 30 CAPSULE | Refills: 2 | Status: SHIPPED | OUTPATIENT
Start: 2022-10-31 | End: 2022-11-30

## 2022-10-31 RX ORDER — VENLAFAXINE 37.5 MG/1
TABLET ORAL
Qty: 90 TABLET | Status: CANCELLED | OUTPATIENT
Start: 2022-10-31

## 2022-10-31 RX ORDER — TIZANIDINE 4 MG/1
4 TABLET ORAL 2 TIMES DAILY PRN
Qty: 60 TABLET | Refills: 2 | Status: SHIPPED | OUTPATIENT
Start: 2022-10-31 | End: 2022-11-30

## 2022-10-31 ASSESSMENT — ENCOUNTER SYMPTOMS
SHORTNESS OF BREATH: 0
BACK PAIN: 1
CONSTIPATION: 0
DIARRHEA: 0
VOMITING: 0
NAUSEA: 0
COUGH: 0
WHEEZING: 0

## 2022-10-31 NOTE — PROGRESS NOTES
Chief Complaint   Patient presents with    Back Pain     Celebrex, Effexor , tizanidine          PMH     Pt c/o severe back pain located in the lower lumbar for over 5 years. No known injury or surgery to the area. Reports occ radiation down to hips without numbness/tingling  Pt has tried  LESI which helped her radicular pain and then RFA with only 15-20% benefit at f/u visit but now feels it just took a little longer to feel better. State pain was well controlled until the last few weeks and would like to repeat RFA  Pt also c/o  thoracic pain over scapular area. Suggested exercises stretches and massage therapy to help with that area   Was evaluated by the spine surgeon at [facility] and is not advised for any surgery  Lumbar MRI 1/2022 multilevel degenerative changes mainly seen at L4-5 and L5-S1 level mild canal stenosis mainly at L5-S1 level   Thoracic MRI 9/22 multilevel degenerative changes without canal stenosis or foraminal  narrowing  Pt had trial Nevro SCS in March and at f/u appt reported  85-90% relief and able to take a couple walks pain free. Has implant scheduled for Jan.      HPI:   Back Pain  This is a chronic problem. The current episode started more than 1 year ago. The problem occurs constantly. The problem is unchanged. The pain is present in the lumbar spine, sacro-iliac, gluteal and thoracic spine. The quality of the pain is described as aching, burning, shooting and stabbing. The pain radiates to the right thigh, right knee, left thigh and left knee. The pain is at a severity of 9/10. The pain is moderate. The pain is The same all the time. The symptoms are aggravated by bending, position, sitting and standing. Associated symptoms include headaches. Pertinent negatives include no chest pain, fever or numbness. Risk factors include obesity, poor posture, sedentary lifestyle and lack of exercise. She has tried muscle relaxant and NSAIDs for the symptoms.  The treatment provided mild relief. Medication Refill:     Pain score Today:  9  Adverse effects (Constipation / Nausea / Sedation / sexual Dysfunction / others) : cramps not sure if it is from meds   Mood: fair  Sleep pattern and quality: fair to poor  Activity level: fair    Pill count Today:NA  Last dose taken  NA  OARRS report reviewed today: yes  ER/Hospitalizations/PCP visit related to pain since last visit:yes for referral from us   Any legal problems e.g. DUI etc.:No  Satisfied with current management: Yes    Opioid Contract:NA  Last Urine Dug screen dated:NA    Hemoglobin A1C   Date Value Ref Range Status   10/27/2022 5.9 % Final       Past Medical History, Past Surgical History, Social History, Allergies and Medications reviewed and updated in EPIC as indicated    Family History reviewed and is noncontributory.            Past Medical History:   Diagnosis Date    ADHD     Anxiety     Back pain     Brain injury     Depression     Hearing loss     Learning disability     Migraine headache     Obese        Past Surgical History:   Procedure Laterality Date    OTHER SURGICAL HISTORY Bilateral 03/25/2021    lumbar DENIZ    PAIN MANAGEMENT PROCEDURE Bilateral 12/14/2020    EPIDURAL STEROID INJECTION BILATERAL L5 performed by Josias Sandoval MD at [facility] Bilateral 1/4/2021    EPIDURAL STEROID INJECTION BILATERAL L5 performed by Josias Sandoval MD at [facility] Bilateral 3/25/2021    LUMBAR FACET STEROID INJECTION BILATERAL L4 / 5 performed by Josias Sandoval MD at [facility]  04/15/2021    MID BACK CYST LESION BIOPSY EXCISION    SKIN BIOPSY N/A 4/15/2021    MID BACK CYST LESION BIOPSY EXCISION performed by Cristiano Harrison DO at [address] [place] [address] EXTRACTION         Allergies   Allergen Reactions    Cat Hair Extract     Dust Mite Extract     Molds & Smuts     Seasonal          Current Outpatient Medications: Semaglutide-Weight Management (WEGOVY) 0.25 MG/0.5ML SOAJ SC injection, Inject 0.25 mg into the skin every 7 days, Disp: 2 mL, Rfl: 0    Melatonin 10 MG TABS, Take 30 mg by mouth, Disp: , Rfl:     phentermine 37.5 MG capsule, Take 37.5 mg by mouth every morning., Disp: , Rfl:     tiZANidine (ZANAFLEX) 4 MG tablet, TAKE 1 TABLET BY MOUTH TWICE DAILY AS NEEDED FOR SPAMS, Disp: , Rfl:     clobetasol (TEMOVATE) 0.05 % cream, Apply topically 2 times daily. , as needed, for itching or scaling., Disp: 60 g, Rfl: 3    clobetasol (TEMOVATE) 0.05 % external solution, Apply topically 2 times daily, as needed. , Disp: 50 mL, Rfl: 2    buPROPion (WELLBUTRIN XL) 300 MG extended release tablet, TAKE 1 TABLET BY MOUTH IN THE MORNING, Disp: , Rfl:     lidocaine (LIDODERM) 5 %, USE 1 PATCH EXTERNALLY ONCE DAILY AS NEEDED 12 HOURS ON, 12 HOURS OFF, Disp: , Rfl:     venlafaxine (EFFEXOR) 37.5 MG tablet, TAKE 1 TABLET BY MOUTH IN THE MORNING FOR 7 DAYS, THEN TAKE 2 TABLETS BY MOUTH IN THE MORNING, Disp: , Rfl:     celecoxib (CELEBREX) 200 MG capsule, Take 1 capsule by mouth in the morning., Disp: 30 capsule, Rfl: 2    Adalimumab (HUMIRA PEN) 40 MG/0.4ML PNKT, Inject 40 mg SC q2wk, Disp: 2 each, Rfl: 2    hydrOXYzine (ATARAX) 25 MG tablet, TAKE 1 TABLET BY MOUTH THREE TIMES DAILY AS NEEDED FOR ITCHING OR ANXIETY, Disp: , Rfl:     Family History   Problem Relation Age of Onset    Diabetes Mother     Heart Disease Father     Diabetes Father     Diabetes Maternal Grandmother     Diabetes Maternal Grandfather     Diabetes Paternal Grandmother     Diabetes Paternal Grandfather     Other Sister         sepsis in blood stream and pneumonia    Alcohol Abuse Brother     Drug Abuse Brother     Alcohol Abuse Brother     Drug Abuse Brother     Breast Cancer Neg Hx     Colon Cancer Neg Hx     Ovarian Cancer Neg Hx        Social History     Socioeconomic History    Marital status: Single     Spouse name: Not on file    Number of children: Not on file Years of education: Not on file    Highest education level: Not on file   Occupational History    Not on file   Tobacco Use    Smoking status: Former     Types: Cigarettes     Quit date: 2016     Years since quittin.8    Smokeless tobacco: Never   Vaping Use    Vaping Use: Former   Substance and Sexual Activity    Alcohol use: Never     Alcohol/week: 0.0 standard drinks    Drug use: No    Sexual activity: Yes     Partners: Male   Other Topics Concern    Not on file   Social History Narrative    Not on file     Social Determinants of Health     Financial Resource Strain: Low Risk     Difficulty of Paying Living Expenses: Not hard at all   Food Insecurity: No Food Insecurity    Worried About Running Out of Food in the Last Year: Never true    Ran Out of Food in the Last Year: Never true   Transportation Needs: Not on file   Physical Activity: Not on file   Stress: Not on file   Social Connections: Not on file   Intimate Partner Violence: Not At Risk    Fear of Current or Ex-Partner: No    Emotionally Abused: No    Physically Abused: No    Sexually Abused: No   Housing Stability: Not on file       Review of Systems:  Review of Systems   Constitutional: Positive for malaise/fatigue. Negative for chills and fever. Cardiovascular:  Negative for chest pain. Respiratory:  Negative for cough, shortness of breath and wheezing. Musculoskeletal:  Positive for back pain, muscle cramps, muscle weakness and stiffness. Negative for falls. Gastrointestinal:  Negative for constipation, diarrhea, nausea and vomiting. Neurological:  Positive for dizziness and headaches. Negative for numbness. Physical Exam:  /74   Pulse 66   Ht 5' 3\" (1.6 m)   Wt 240 lb (108.9 kg)   SpO2 94%   BMI 42.51 kg/m²     Physical Exam  Cardiovascular:      Rate and Rhythm: Normal rate. Pulmonary:      Effort: Pulmonary effort is normal.   Musculoskeletal:      Thoracic back: Tenderness present.       Lumbar back: Decreased range of motion. Back:    Skin:     General: Skin is warm and dry. Neurological:      Mental Status: She is alert and oriented to person, place, and time. Assessment:  Problem List Items Addressed This Visit       Left lumbar radiculitis (Chronic)    Chronic bilateral low back pain with bilateral sciatica    Lumbar facet joint syndrome - Primary          Treatment Plan:  Patient relates current medications are helping the pain. Patient reports taking pain medications as prescribed, denies obtaining medications from different sources and denies use of illegal drugs. Patient denies side effects from medications like nausea, vomiting, constipation or drowsiness. Patient reports current activities of daily living are possible due to medications and would like to continue them. As always, we encourage daily stretching and strengthening exercises, and recommend minimizing use of pain medications unless patient cannot get through daily activities due to pain. Script written for tizanidine and celebrex  MRIs reviewed with with pt  Discussed different treatment options including continued conservative care such as physical therapy, chiropractic care, acupuncture. pt declines states does HEP  Discussed different interventional options such as epidural steroids or medial branch blocks. And would like to repeat lumbar RFA     Lumbar RFA ordered  Consider thoracic DENIZ if pain continues  Follow up appointment made for 3 months for med refill     I have reviewed the chief complaint and history of present illness (including ROS and PFSH) and vital documentation by my staff and I agree with their documentation and have added where applicable.

## 2022-11-07 ENCOUNTER — PATIENT MESSAGE (OUTPATIENT)
Dept: FAMILY MEDICINE CLINIC | Age: 32
End: 2022-11-07

## 2022-11-07 NOTE — TELEPHONE ENCOUNTER
From: Maira Sol  To: Dr. Bonnetta Najjar  Sent: 11/7/2022 11:45 AM EST  Subject: Medication question     Hey doc I was wondering if you can prescribe me some migraine medicine for my migraines and some nausea medication for my nausea that I get sometimes please?

## 2022-11-08 RX ORDER — PROMETHAZINE HYDROCHLORIDE 25 MG/1
25 TABLET ORAL 3 TIMES DAILY PRN
Qty: 12 TABLET | Refills: 0 | Status: SHIPPED | OUTPATIENT
Start: 2022-11-08 | End: 2022-11-15

## 2022-11-08 RX ORDER — SUMATRIPTAN 50 MG/1
50 TABLET, FILM COATED ORAL
Qty: 9 TABLET | Refills: 5 | Status: SHIPPED | OUTPATIENT
Start: 2022-11-08 | End: 2022-11-30

## 2022-11-22 ENCOUNTER — TELEPHONE (OUTPATIENT)
Dept: OBGYN CLINIC | Age: 32
End: 2022-11-22

## 2022-11-22 NOTE — TELEPHONE ENCOUNTER
Pt called asking for a call back about pt going to the bathroom and had clear but very sticky discharge, no odor, no itching just the wired discharge. She would like a call back just to be able to ask questions. Please advise.

## 2022-11-23 NOTE — TELEPHONE ENCOUNTER
Spoke to patient last period was 1-2 weeks ago, informed patient that the discharge is likely ovulation discharge to monitor for any odor, color or vaginal irritation . Pt states she had an irregular period in October but it was the first month without her depo. Pt advised to monitor and journal any irregular bleeding to discuss at annual in December.

## 2022-11-30 ENCOUNTER — HOSPITAL ENCOUNTER (OUTPATIENT)
Dept: PAIN MANAGEMENT | Facility: CLINIC | Age: 32
Discharge: HOME | End: 2022-11-30
Payer: MEDICARE

## 2022-11-30 ENCOUNTER — PATIENT MESSAGE (OUTPATIENT)
Dept: FAMILY MEDICINE CLINIC | Age: 32
End: 2022-11-30

## 2022-11-30 VITALS
HEIGHT: 63 IN | WEIGHT: 250 LBS | BODY MASS INDEX: 44.3 KG/M2 | TEMPERATURE: 97.1 F | RESPIRATION RATE: 12 BRPM | HEART RATE: 86 BPM | SYSTOLIC BLOOD PRESSURE: 131 MMHG | DIASTOLIC BLOOD PRESSURE: 86 MMHG | OXYGEN SATURATION: 98 %

## 2022-11-30 DIAGNOSIS — R52 PAIN MANAGEMENT: ICD-10-CM

## 2022-11-30 DIAGNOSIS — M47.817 LUMBOSACRAL SPONDYLOSIS WITHOUT MYELOPATHY: Chronic | ICD-10-CM

## 2022-11-30 DIAGNOSIS — M47.816 LUMBAR FACET JOINT SYNDROME: Primary | ICD-10-CM

## 2022-11-30 LAB — HCG, PREGNANCY URINE (POC): NEGATIVE

## 2022-11-30 PROCEDURE — 64635 DESTROY LUMB/SAC FACET JNT: CPT

## 2022-11-30 PROCEDURE — 64636 DESTROY L/S FACET JNT ADDL: CPT

## 2022-11-30 PROCEDURE — 2500000003 HC RX 250 WO HCPCS: Performed by: ANESTHESIOLOGY

## 2022-11-30 PROCEDURE — 81025 URINE PREGNANCY TEST: CPT

## 2022-11-30 PROCEDURE — 6360000002 HC RX W HCPCS: Performed by: ANESTHESIOLOGY

## 2022-11-30 RX ORDER — LIDOCAINE HYDROCHLORIDE 40 MG/ML
INJECTION, SOLUTION RETROBULBAR; TOPICAL
Status: COMPLETED | OUTPATIENT
Start: 2022-11-30 | End: 2022-11-30

## 2022-11-30 RX ORDER — FENTANYL CITRATE 50 UG/ML
INJECTION, SOLUTION INTRAMUSCULAR; INTRAVENOUS
Status: COMPLETED | OUTPATIENT
Start: 2022-11-30 | End: 2022-11-30

## 2022-11-30 RX ORDER — MIDAZOLAM HYDROCHLORIDE 2 MG/2ML
INJECTION, SOLUTION INTRAMUSCULAR; INTRAVENOUS
Status: COMPLETED | OUTPATIENT
Start: 2022-11-30 | End: 2022-11-30

## 2022-11-30 RX ORDER — LIDOCAINE HYDROCHLORIDE 10 MG/ML
INJECTION, SOLUTION EPIDURAL; INFILTRATION; INTRACAUDAL; PERINEURAL
Status: COMPLETED | OUTPATIENT
Start: 2022-11-30 | End: 2022-11-30

## 2022-11-30 RX ADMIN — FENTANYL CITRATE 50 MCG: 50 INJECTION, SOLUTION INTRAMUSCULAR; INTRAVENOUS at 09:35

## 2022-11-30 RX ADMIN — LIDOCAINE HYDROCHLORIDE 5 ML: 10 INJECTION, SOLUTION EPIDURAL; INFILTRATION; INTRACAUDAL at 09:45

## 2022-11-30 RX ADMIN — LIDOCAINE HYDROCHLORIDE 5 ML: 40 SOLUTION RETROBULBAR; TOPICAL at 09:51

## 2022-11-30 RX ADMIN — MIDAZOLAM HYDROCHLORIDE 2 MG: 1 INJECTION, SOLUTION INTRAMUSCULAR; INTRAVENOUS at 09:34

## 2022-11-30 RX ADMIN — FENTANYL CITRATE 50 MCG: 50 INJECTION, SOLUTION INTRAMUSCULAR; INTRAVENOUS at 09:46

## 2022-11-30 RX ADMIN — LIDOCAINE HYDROCHLORIDE 5 ML: 10 INJECTION, SOLUTION EPIDURAL; INFILTRATION; INTRACAUDAL at 09:36

## 2022-11-30 ASSESSMENT — PAIN DESCRIPTION - DESCRIPTORS: DESCRIPTORS: THROBBING;ACHING

## 2022-11-30 ASSESSMENT — PAIN - FUNCTIONAL ASSESSMENT
PAIN_FUNCTIONAL_ASSESSMENT: PREVENTS OR INTERFERES WITH ALL ACTIVE AND SOME PASSIVE ACTIVITIES
PAIN_FUNCTIONAL_ASSESSMENT: 0-10
PAIN_FUNCTIONAL_ASSESSMENT: NONE - DENIES PAIN

## 2022-11-30 NOTE — OP NOTE
Preoperative Diagnosis: Lumbar spondylosis w/o myelopathy, chronic low back pain  Postoperative Diagnosis: Lumbar spondylosis w/o myelopathy, chronic low back pain  SEDATION: SEE SEDATION NOTE  BLOOD LOSS: NONE    Procedure Performed:  :Radiofrequency ablation of median branches at the Transverse processes of L4, L5 AND SACRAL ALA for L4/5 AND L5/S1 facet joints on Bilateral under fluoroscopic guidance with IV sedation. t    Procedure:    After starting an IV, the patient was taken to procedure room. The patient was placed in prone position and skin over the back was prepped and draped in sterile manner. Standard monitors were connected and vitals were monitored during the case and they remained stable during the procedure. A meaningful communication was kept up with the patient throughout the procedure. Then using fluoroscopy the junction of the transverse process of the target vertebra with the superior process of the facet joint was observed and the view was optimized. The skin and deep tissues were infiltrated with 2 ml of  1 % lidocaine. The RF canula with the 10 mm active tip was introduced through the skin wheal under fluoroscopy guidance such that the tip of the needle lies in the groove of the transverse process with the superior processes of the facet joint. Then a lateral and AP view of the lumbar spine was obtained to make sure the tip of needle is not in the neural foramen. Then electric impedence was checked to make sure it is acceptable. Then a sensory stimulus was applied at 50 Hz up to 0.5 volt and concordant pain symptoms were reproduced. Then a motor stimulus was applied at 2 Hz up to 2 volts or 3 x times the sensory stimulus and no motor stimulation was seen in lower extremities. Some multifidus stimulus was seen. Then after negative aspiration 1 ml of 4% lidocaine was injected through the needle at each level. The radiofrequency lesion was done at 85 degrees centigrade for 110 seconds. Patient's vital signs and neurological status remained stable throughout the procedure and post procedural period. The patient tolerated the procedure well and was discharged home in stable condition. SEDATION NOTE:    ASA CLASSIFICATION  3  MP   CLASSIFICATION  3    Moderate intravenous conscious sedation was supervised by Dr. Lucien Godfrey  The patient was independently monitored by a Registered Nurse assigned to the Procedure Room  Monitoring included automated blood pressure, continuous EKG, Capnography and continuous pulse oximetry. The detailed Conscious Record is permanently stored in the [facility] .      The following is the conscious sedation record;  Start Time:  0930  End times:  0957  Duration:  27 MINUTES  MEDS GIVEN 2 MG VERSED  MCG FENTANYL

## 2022-11-30 NOTE — H&P
UPDATE:  Office visit pain clinic in [facility] with all required elements of H&P dated 10/31/2022  Patient seen preop, chart reviewed,   No changes in medical history and health assessment since last evaluation. PE:  AAO x 3, in NAD, VSS,. Resp: breathing on RA, no respiratory distress  Cardiac: rate normal    Risk / Benefits explained to patient, patient agree to proceed with plan.   ASA 3  MP 3

## 2022-11-30 NOTE — DISCHARGE INSTRUCTIONS
Discharge Instructions following Sedation or Anesthesia:  You have  received  a sedative/anesthetic therefore, you should not consume any alcoholic beverages for minimum of 12 hours. Do not drive or operate machinery for 24 hours. Do not sign legal documents for 24 hours. Dizziness, drowsiness, and unsteadiness may occur. Rest when need to. Increase diet as tolerated. Keep up on fluids if diet allows. General Instructions:  Do not take a tub bath for 72 hours after procedure (this includes hot tubs and swimming pools). You may shower, but avoid hot water to injection site. Avoid strenuous activity TODAY especially if you experience dizziness. Remove band-aid the next day. Wash off any residual iodine   Do not use heat, heating pad, or any other heating device over the injection site for 3 days after the procedure. If you experience pain after your procedure, you may continue with your current pain medication as prescribed. (DO NOT INCREASE YOUR PAIN MEDICATION WITHOUT TALKING TO DOCTOR)  Soreness and pain at injection site is common, may use ice to reduce soreness.     Call [facility] at [phone] if you experience:   Fever, chills or temperature over 100    Vomiting, Headache, persistent stiff neck, nausea, blurred vision   Difficulty in urinating or unable to urinate with 8 hours   Increase in weakness, numbness or loss of function   Increased redness, swelling or drainage at the injection site

## 2022-12-01 NOTE — TELEPHONE ENCOUNTER
From: Edelmira Peña  To: Dr. Jose L Peralta  Sent: 11/30/2022 5:23 PM EST  Subject: Feeling weird     The past week or so I get very light headed, nauseated, dizzy, and I cant pin point what could be causing this. I don't know if it's my sugar dropping, if it's my anxiety acting up or if it could be something else, when I think its my sugar when we checked it the last time it was normal so im not quite sure on what it could be.

## 2022-12-05 ENCOUNTER — OFFICE VISIT (OUTPATIENT)
Dept: PAIN MANAGEMENT | Age: 32
End: 2022-12-05
Payer: MEDICARE

## 2022-12-05 VITALS — WEIGHT: 249 LBS | HEIGHT: 63 IN | OXYGEN SATURATION: 99 % | BODY MASS INDEX: 44.12 KG/M2 | HEART RATE: 49 BPM

## 2022-12-05 DIAGNOSIS — M54.42 CHRONIC BILATERAL LOW BACK PAIN WITH BILATERAL SCIATICA: ICD-10-CM

## 2022-12-05 DIAGNOSIS — M54.16 LEFT LUMBAR RADICULITIS: Chronic | ICD-10-CM

## 2022-12-05 DIAGNOSIS — M47.816 LUMBAR FACET JOINT SYNDROME: Primary | ICD-10-CM

## 2022-12-05 DIAGNOSIS — M54.41 CHRONIC BILATERAL LOW BACK PAIN WITH BILATERAL SCIATICA: ICD-10-CM

## 2022-12-05 DIAGNOSIS — G89.29 CHRONIC BILATERAL LOW BACK PAIN WITH BILATERAL SCIATICA: ICD-10-CM

## 2022-12-05 DIAGNOSIS — M51.36 DDD (DEGENERATIVE DISC DISEASE), LUMBAR: ICD-10-CM

## 2022-12-05 PROCEDURE — G8427 DOCREV CUR MEDS BY ELIG CLIN: HCPCS | Performed by: NURSE PRACTITIONER

## 2022-12-05 PROCEDURE — G8484 FLU IMMUNIZE NO ADMIN: HCPCS | Performed by: NURSE PRACTITIONER

## 2022-12-05 PROCEDURE — G8417 CALC BMI ABV UP PARAM F/U: HCPCS | Performed by: NURSE PRACTITIONER

## 2022-12-05 PROCEDURE — 99213 OFFICE O/P EST LOW 20 MIN: CPT | Performed by: NURSE PRACTITIONER

## 2022-12-05 PROCEDURE — 1036F TOBACCO NON-USER: CPT | Performed by: NURSE PRACTITIONER

## 2022-12-05 RX ORDER — LUMATEPERONE 42 MG/1
CAPSULE ORAL
COMMUNITY
Start: 2022-12-04

## 2022-12-05 ASSESSMENT — ENCOUNTER SYMPTOMS
SHORTNESS OF BREATH: 0
BACK PAIN: 1
COUGH: 0
NAUSEA: 0
CONSTIPATION: 0
VOMITING: 0
DIARRHEA: 0

## 2022-12-05 NOTE — PROGRESS NOTES
Chief Complaint   Patient presents with    Back Pain    Follow Up After Procedure     :Radiofrequency ablation of median branches          PMH     Pt c/o severe back pain located in the lower lumbar for over 5 years. No known injury or surgery to the area. Reports occ radiation down to hips without numbness/tingling  Pt had  LESI in 2020 which helped her radicular pain   Pt also c/o  thoracic pain over scapular area. Suggested exercises stretches and massage therapy to help with that area   Was evaluated by the spine surgeon at [facility] and is not advised for any surgery  Lumbar MRI 1/2022 multilevel degenerative changes mainly seen at L4-5 and L5-S1 level mild canal stenosis mainly at L5-S1 level   Thoracic MRI 9/22 multilevel degenerative changes without canal stenosis or foraminal  narrowing  Pt had trial Nevro SCS in March and at f/u appt reported  85-90% relief and able to take a couple walks pain free. Has implant scheduled for Dano. 3 with Dr Cifuentes Skates    Here today to f/u after bilat lumbar RFA  of L4, L5 AND SACRAL ALA for L4/5 AND L5/S1 facet joints done 11/30/22 and reports  75-80% relief of pain and improved activity tolerance      HPI:   Back Pain  This is a chronic problem. The current episode started more than 1 year ago. The problem occurs intermittently. The problem has been gradually improving since onset. The pain is present in the lumbar spine. The quality of the pain is described as aching. The pain does not radiate. The pain is at a severity of 3/10. The pain is mild. The pain is Worse during the day. Associated symptoms include headaches. Pertinent negatives include no chest pain, fever or numbness. Risk factors include obesity, poor posture, sedentary lifestyle and lack of exercise. She has tried analgesics, heat, muscle relaxant and NSAIDs for the symptoms. The treatment provided mild relief.         Dx:  S/P::Radiofrequency ablation of median branches       Outcome   Any improvement of activity? Yes   Any side effects (appetite,leg cramping,facial fleshing): no   Increase of pain:  No  Pain score Today:  3  % of pain relief: 75 80  Pain diary (medial branch block): No    Hemoglobin A1C   Date Value Ref Range Status   10/27/2022 5.9 % Final                  Past Medical History:   Diagnosis Date    ADHD     Anxiety     Back pain     Brain injury     Depression     Hearing loss     Learning disability     Migraine headache     Obese        Past Surgical History:   Procedure Laterality Date    OTHER SURGICAL HISTORY Bilateral 03/25/2021    lumbar DENIZ    PAIN MANAGEMENT PROCEDURE Bilateral 12/14/2020    EPIDURAL STEROID INJECTION BILATERAL L5 performed by Albert Rockwell MD at [address] [place] [address] Bilateral 1/4/2021    EPIDURAL STEROID INJECTION BILATERAL L5 performed by Albert Rockwell MD at [address] [place] [address] Bilateral 3/25/2021    LUMBAR FACET STEROID INJECTION BILATERAL L4 / 5 performed by Albert Rockwell MD at [address] [place] [address]  04/15/2021    MID BACK CYST LESION BIOPSY EXCISION    SKIN BIOPSY N/A 4/15/2021    MID BACK CYST LESION BIOPSY EXCISION performed by Jose Angel Roque DO at [address] EXTRACTION         Allergies   Allergen Reactions    Cat Hair Extract     Dust Mite Extract     Molds & Smuts     Seasonal          Current Outpatient Medications:     CAPLYTA 42 MG CAPS, , Disp: , Rfl:     Semaglutide-Weight Management (WEGOVY) 0.25 MG/0.5ML SOAJ SC injection, Inject 0.25 mg into the skin every 7 days, Disp: 2 mL, Rfl: 0    Melatonin 10 MG TABS, Take 30 mg by mouth, Disp: , Rfl:     phentermine 37.5 MG capsule, Take 37.5 mg by mouth every morning., Disp: , Rfl:     tiZANidine (ZANAFLEX) 4 MG tablet, TAKE 1 TABLET BY MOUTH TWICE DAILY AS NEEDED FOR SPAMS, Disp: , Rfl:     clobetasol (TEMOVATE) 0.05 % cream, Apply topically 2 times daily. , as needed, for itching or scaling., Disp: 60 g, Rfl: 3 clobetasol (TEMOVATE) 0.05 % external solution, Apply topically 2 times daily, as needed. , Disp: 50 mL, Rfl: 2    buPROPion (WELLBUTRIN XL) 300 MG extended release tablet, TAKE 1 TABLET BY MOUTH IN THE MORNING, Disp: , Rfl:     lidocaine (LIDODERM) 5 %, USE 1 PATCH EXTERNALLY ONCE DAILY AS NEEDED 12 HOURS ON, 12 HOURS OFF, Disp: , Rfl:     venlafaxine (EFFEXOR) 37.5 MG tablet, TAKE 1 TABLET BY MOUTH IN THE MORNING FOR 7 DAYS, THEN TAKE 2 TABLETS BY MOUTH IN THE MORNING, Disp: , Rfl:     Adalimumab (HUMIRA PEN) 40 MG/0.4ML PNKT, Inject 40 mg SC q2wk, Disp: 2 each, Rfl: 2    hydrOXYzine (ATARAX) 25 MG tablet, TAKE 1 TABLET BY MOUTH THREE TIMES DAILY AS NEEDED FOR ITCHING OR ANXIETY, Disp: , Rfl:     SUMAtriptan (IMITREX) 50 MG tablet, Take 1 tablet by mouth once as needed for Migraine, Disp: 9 tablet, Rfl: 5    celecoxib (CELEBREX) 200 MG capsule, Take 1 capsule by mouth daily, Disp: 30 capsule, Rfl: 2    Family History   Problem Relation Age of Onset    Diabetes Mother     Heart Disease Father     Diabetes Father     Diabetes Maternal Grandmother     Diabetes Maternal Grandfather     Diabetes Paternal Grandmother     Diabetes Paternal Grandfather     Other Sister         sepsis in blood stream and pneumonia    Alcohol Abuse Brother     Drug Abuse Brother     Alcohol Abuse Brother     Drug Abuse Brother     Breast Cancer Neg Hx     Colon Cancer Neg Hx     Ovarian Cancer Neg Hx        Social History     Socioeconomic History    Marital status: Single     Spouse name: Not on file    Number of children: Not on file    Years of education: Not on file    Highest education level: Not on file   Occupational History    Not on file   Tobacco Use    Smoking status: Former     Types: Cigarettes     Quit date: 2016     Years since quittin.9    Smokeless tobacco: Never   Vaping Use    Vaping Use: Former   Substance and Sexual Activity    Alcohol use: Never     Alcohol/week: 0.0 standard drinks    Drug use: No    Sexual activity: Yes     Partners: Male   Other Topics Concern    Not on file   Social History Narrative    Not on file     Social Determinants of Health     Financial Resource Strain: Not on file   Food Insecurity: Not on file   Transportation Needs: Not on file   Physical Activity: Not on file   Stress: Not on file   Social Connections: Not on file   Intimate Partner Violence: Not on file   Housing Stability: Not on file       Review of Systems:  Review of Systems   Constitutional: Positive for malaise/fatigue. Negative for chills and fever. Cardiovascular:  Negative for chest pain. Respiratory:  Negative for cough and shortness of breath. Musculoskeletal:  Positive for back pain, muscle cramps and stiffness. Negative for falls and muscle weakness. Gastrointestinal:  Negative for constipation, diarrhea, nausea and vomiting. Neurological:  Positive for dizziness and headaches. Negative for numbness. Physical Exam:  Pulse (!) 49   Ht 5' 3\" (1.6 m)   Wt 249 lb (112.9 kg)   SpO2 99%   BMI 44.11 kg/m²     Physical Exam  Cardiovascular:      Rate and Rhythm: Normal rate. Pulmonary:      Effort: Pulmonary effort is normal.   Musculoskeletal:         General: Normal range of motion. Skin:     General: Skin is warm and dry. Neurological:      Mental Status: She is alert and oriented to person, place, and time.            Assessment:  Problem List Items Addressed This Visit       Left lumbar radiculitis (Chronic)    Relevant Medications    CAPLYTA 42 MG CAPS    Chronic bilateral low back pain with bilateral sciatica    Relevant Medications    CAPLYTA 42 MG CAPS    Lumbar facet joint syndrome - Primary    DDD (degenerative disc disease), lumbar          Treatment Plan:    Patient relates significant relief from recent intervention   Plans to continue with SCS implant next month   F/u in 3 months     I have reviewed the chief complaint and history of present illness (including ROS and PFSH) and vital documentation by my staff and I agree with their documentation and have added where applicable.

## 2022-12-16 RX ORDER — SODIUM CHLORIDE, SODIUM LACTATE, POTASSIUM CHLORIDE, CALCIUM CHLORIDE 600; 310; 30; 20 MG/100ML; MG/100ML; MG/100ML; MG/100ML
1000 INJECTION, SOLUTION INTRAVENOUS CONTINUOUS
OUTPATIENT
Start: 2022-12-16

## 2022-12-16 NOTE — DISCHARGE INSTRUCTIONS
Pre-operative Instructions           NOTHING to eat or drink after midnight the night prior to surgery    (This includes gum, candy, mints, chewing tobacco, etc). Please arrive at the surgery center (Entrance B) by 6:00 AM on 1/3/2023 (or as directed by your surgeon's office). See Directons to Surgery Center below. Please take only the following medication(s) the day of surgery with a small sip of water: Pantoprazole (Protonix)    Please stop any blood thinning medications  AS DIRECTED BY PRESCRIBING PROVIDER! : Celebrex and Humira  Failure to stop these medications as instructed (too soon or too late) may result in injury to you, or your surgery may need to be rescheduled. Below is a list of some examples for your reference. Antiplatelets : (stop blood cells (called platelets) from sticking together and forming a blood clot):   Aspirin (Bufferin, Ecotrin), Clopidogrel (Plavix), Ticagrelor (Brilinta), Prasugrel (Effient), Dipyridamole/aspirin (Aggrenox), Ticlopidine (Ticlid), Eptifibatide (Integrilin)    Anticoagulants: (slow down your body's process of making clots): Warfarin (Coumadin), Rivaroxaban (Xarelto), Dabigatran (Pradaxa), Apixaban (Eliquis), Edoxaban (Savaysa), Heparin/Enoxaparin (Lovenox), Fondaparinux (Arixtra)    NSAIDS: Aspirin (Bufferin, Ecotrin), Ibuprofen (Motrin, Nuprin,Advil), Naproxen (Aleve),Meloxicam (Mobic), Celecoxib (Celebrex), Diclofenac (Voltaren), Etodolac (Lodine), Indomethacin, Ketorolac, Nabumetone, Oxaprozin (Daypro), Piroxicam (Feldene), Excedrin (has aspirin in it)    Herbal supplements: Bromelain, Cinnamon, [employer], [place] (female ginseng), Fish oil, Garlic, Geovanna,Ginkgo biloba, Grape seed extract, Turmeric, Vitamin E, etc....)    You may continue the rest of your medications through the night before surgery unless instructed otherwise. If applicable:   Do not take diabetic medications on the day of surgery.   Please use/bring daily inhalers with you 12/20/22  9:24 AM      ___________________  _______________________  Signature (Provider)              Signature (Patient)     Day of Surgery/Procedure    As a patient at [facility] you can expect quality medical and nursing care that is centered on your individual needs. Our goal is to make your surgical experience as comfortable as possible    Directions to the [address] [place] [address]. [facility] is located in the Emergency Room parking lot on [facility] or there is additional parking across the street. The address is [address] [place] [id number]. Please check in at the [facility] upon arrival.     Patient Instructions  In case of any illness please contact your surgeons office for instructions prior to coming to the hospital.    Due to current restrictions you are only allowed 2 adults to be with you. Masks are to be worn and screening will take place on arrival.     Bring your current list of medications, vitamins, herbals and anything you might take on an  \"as needed \" basis. It is important we have a correct list with dosages and frequencies. Please verify your list with your medications at home or bring all of your bottles with you. If you have been given a blood band be sure to bring it with you on the day of surgery. Do not put it on or close the clasp. Use and bring any inhalers if you are currently using one. It is ok to brush your teeth but do not swallow any water. You may be required to provide a urine sample upon your arrival to the pre-op area, so please take this into consideration prior to using the restroom. No jewelry or piercing's to be worn into surgery because you might be injured because of them. No contact lenses to be worn. It is ok to wear your glasses in pre op but they will be removed prior to going into the operating room.     Dentures/partials will likely need to be removed in pre op depending on your type of anesthesia, please do not use adhesives on the day of surgery. Bathe as instructed with the special soap given to you. No lotions, powders or creams after bathing. Wear loose comfortable clothing / shoes that are easy to get on and off over wounds or casts. If you are going to be admitted after surgery please bring your Cpap or BiPap if you have it at home. Keep patient belongings to a minimum and leave valuables at home. If you are staying overnight with us, please bring a SMALL bag of personal items. We cannot accommodate large items, like suitcases. If you are going home after anesthesia or sedation then you must have a responsible adult with you to take you home and to be with you for the first 24 hours after surgery. If you do not have someone to stay with you your surgery might get cancelled. Please contact your surgeon's office to see if other arrangements can be made if you can not find someone to stay with you. If you have any other questions on the day of surgery please contact [phone] or [phone]    If you have any other questions regarding your procedure/surgery please call  your surgeon's office.

## 2022-12-19 ENCOUNTER — TELEPHONE (OUTPATIENT)
Dept: FAMILY MEDICINE CLINIC | Age: 32
End: 2022-12-19

## 2022-12-19 NOTE — TELEPHONE ENCOUNTER
Patient is having  c/o migraine states that Imitrex is not helping with symptoms.  Patient is also having issues with dizziness standing and sitting down., last dose of medication taken this morning at 9 am, would like to know of any other medications that provider has

## 2022-12-20 ENCOUNTER — TELEPHONE (OUTPATIENT)
Dept: FAMILY MEDICINE CLINIC | Age: 32
End: 2022-12-20

## 2022-12-20 ENCOUNTER — OFFICE VISIT (OUTPATIENT)
Dept: PAIN MANAGEMENT | Age: 32
End: 2022-12-20
Payer: MEDICARE

## 2022-12-20 ENCOUNTER — HOSPITAL ENCOUNTER (OUTPATIENT)
Dept: PREADMISSION TESTING | Age: 32
Discharge: HOME | End: 2022-12-24
Payer: MEDICARE

## 2022-12-20 VITALS
WEIGHT: 246 LBS | OXYGEN SATURATION: 98 % | RESPIRATION RATE: 18 BRPM | HEIGHT: 63 IN | DIASTOLIC BLOOD PRESSURE: 82 MMHG | SYSTOLIC BLOOD PRESSURE: 121 MMHG | TEMPERATURE: 96.8 F | HEART RATE: 67 BPM | BODY MASS INDEX: 43.59 KG/M2

## 2022-12-20 VITALS
HEIGHT: 63 IN | OXYGEN SATURATION: 98 % | WEIGHT: 246 LBS | DIASTOLIC BLOOD PRESSURE: 89 MMHG | SYSTOLIC BLOOD PRESSURE: 130 MMHG | HEART RATE: 92 BPM | BODY MASS INDEX: 43.59 KG/M2

## 2022-12-20 DIAGNOSIS — M54.41 CHRONIC BILATERAL LOW BACK PAIN WITH BILATERAL SCIATICA: Primary | ICD-10-CM

## 2022-12-20 DIAGNOSIS — M54.42 CHRONIC BILATERAL LOW BACK PAIN WITH BILATERAL SCIATICA: Primary | ICD-10-CM

## 2022-12-20 DIAGNOSIS — G89.29 CHRONIC BILATERAL LOW BACK PAIN WITH BILATERAL SCIATICA: Primary | ICD-10-CM

## 2022-12-20 LAB
ABO/RH: NORMAL
ANION GAP SERPL CALCULATED.3IONS-SCNC: 7 MMOL/L (ref 9–17)
ANTIBODY SCREEN: NEGATIVE
ARM BAND NUMBER: NORMAL
BUN BLDV-MCNC: 10 MG/DL (ref 6–20)
CHLORIDE BLD-SCNC: 102 MMOL/L (ref 98–107)
CO2: 30 MMOL/L (ref 20–31)
CREAT SERPL-MCNC: 0.81 MG/DL (ref 0.5–0.9)
EXPIRATION DATE: NORMAL
GFR SERPL CREATININE-BSD FRML MDRD: >60 ML/MIN/1.73M2
GLUCOSE BLD-MCNC: 80 MG/DL (ref 70–99)
HCT VFR BLD CALC: 44.3 % (ref 36.3–47.1)
HEMOGLOBIN: 13.8 G/DL (ref 11.9–15.1)
INR BLD: 1
MCH RBC QN AUTO: 26.3 PG (ref 25.2–33.5)
MCHC RBC AUTO-ENTMCNC: 31.2 G/DL (ref 28.4–34.8)
MCV RBC AUTO: 84.4 FL (ref 82.6–102.9)
NRBC AUTOMATED: 0 PER 100 WBC
PARTIAL THROMBOPLASTIN TIME: 26 SEC (ref 20.5–30.5)
PDW BLD-RTO: 13.6 % (ref 11.8–14.4)
PLATELET # BLD: 438 K/UL (ref 138–453)
PMV BLD AUTO: 9.9 FL (ref 8.1–13.5)
POTASSIUM SERPL-SCNC: 3.6 MMOL/L (ref 3.7–5.3)
PROTHROMBIN TIME: 10.4 SEC (ref 9.1–12.3)
RBC # BLD: 5.25 M/UL (ref 3.95–5.11)
SODIUM BLD-SCNC: 139 MMOL/L (ref 135–144)
WBC # BLD: 6.1 K/UL (ref 3.5–11.3)

## 2022-12-20 PROCEDURE — 93005 ELECTROCARDIOGRAM TRACING: CPT | Performed by: ANESTHESIOLOGY

## 2022-12-20 PROCEDURE — 85610 PROTHROMBIN TIME: CPT

## 2022-12-20 PROCEDURE — 85027 COMPLETE CBC AUTOMATED: CPT

## 2022-12-20 PROCEDURE — 84520 ASSAY OF UREA NITROGEN: CPT

## 2022-12-20 PROCEDURE — G8417 CALC BMI ABV UP PARAM F/U: HCPCS | Performed by: ANESTHESIOLOGY

## 2022-12-20 PROCEDURE — 82565 ASSAY OF CREATININE: CPT

## 2022-12-20 PROCEDURE — 1036F TOBACCO NON-USER: CPT | Performed by: ANESTHESIOLOGY

## 2022-12-20 PROCEDURE — 86850 RBC ANTIBODY SCREEN: CPT

## 2022-12-20 PROCEDURE — 86900 BLOOD TYPING SEROLOGIC ABO: CPT

## 2022-12-20 PROCEDURE — 99213 OFFICE O/P EST LOW 20 MIN: CPT | Performed by: ANESTHESIOLOGY

## 2022-12-20 PROCEDURE — G8427 DOCREV CUR MEDS BY ELIG CLIN: HCPCS | Performed by: ANESTHESIOLOGY

## 2022-12-20 PROCEDURE — G8484 FLU IMMUNIZE NO ADMIN: HCPCS | Performed by: ANESTHESIOLOGY

## 2022-12-20 PROCEDURE — 86901 BLOOD TYPING SEROLOGIC RH(D): CPT

## 2022-12-20 PROCEDURE — 80051 ELECTROLYTE PANEL: CPT

## 2022-12-20 PROCEDURE — 85730 THROMBOPLASTIN TIME PARTIAL: CPT

## 2022-12-20 PROCEDURE — 82947 ASSAY GLUCOSE BLOOD QUANT: CPT

## 2022-12-20 RX ORDER — PANTOPRAZOLE SODIUM 40 MG/1
40 TABLET, DELAYED RELEASE ORAL DAILY
COMMUNITY
Start: 2022-12-17

## 2022-12-20 RX ORDER — TIZANIDINE 4 MG/1
TABLET ORAL
Qty: 60 TABLET | Refills: 1 | Status: SHIPPED | OUTPATIENT
Start: 2022-12-20

## 2022-12-20 RX ORDER — LIDOCAINE 50 MG/G
PATCH TOPICAL
Qty: 30 PATCH | Refills: 1 | Status: SHIPPED | OUTPATIENT
Start: 2022-12-20

## 2022-12-20 NOTE — PROGRESS NOTES
The patient is a 29 y. o. Non- / non  female.     Chief Complaint   Patient presents with    Back Pain      61-year-old female with history of chronic low back and bilateral lower extremity pain  Symptoms are chronic and progressively worsened  She failed different modalities  Imaging did not show any surgical pathology  She had a successful spinal cord stimulator trial and is awaiting permanent implant next month    She is on chronic nonopioid medication regimen with lidocaine patch and muscle relaxant Zanaflex  Find it helpful  Reports no side effects  No other interim changes in health history    Past Medical History:   Diagnosis Date    ADHD     Anxiety     Arthritis     Back pain     Bipolar 1 disorder, depressed ([address]     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    COVID-19 vaccine series not administered     DDD (degenerative disc disease), lumbar     Depression     Fibromyalgia     GERD (gastroesophageal reflux disease)     Hearing loss     Knee pain     Learning disability     Lumbar spondylosis     with radiculopathy    Migraine headache     MTHFR (methylene THF reductase) deficiency and homocystinuria (HCC)     Obese     Prediabetes     Psoriasis     on Humira,  to use last dose on 12/22/22 per dermatologist for upcoming SCS surgery    RLS (restless legs syndrome)     Under care of service provider 12/20/2022    xze-Hkubjhsd-slydudeClari silva-last visit oct 2022    Under care of service provider 12/20/2022    gi-estrella [address]-last visit nov 2022    Under care of service provider 12/20/2022    pain-Aundrea Jorge-last visit dec 2022    Under care of service provider 12/20/2022    behavioral health-lázaro turner- [facility]-last visit dec 2022    Under care of service provider 12/20/2022    ketbdficj-hvverib-uc vincent-last visit dec 2022        Past Surgical History:   Procedure Laterality Date    ADENOIDECTOMY      KNEE ARTHROSCOPY Left     OTHER SURGICAL HISTORY Bilateral 2021    lumbar DENIZ    OTHER SURGICAL HISTORY      bone spurs removed from both feet    PAIN MANAGEMENT PROCEDURE Bilateral 2020    EPIDURAL STEROID INJECTION BILATERAL L5 performed by Melissa Montana MD at [facility] Bilateral 2021    EPIDURAL STEROID INJECTION BILATERAL L5 performed by Melissa Montana MD at [facility] Bilateral 2021    LUMBAR FACET STEROID INJECTION BILATERAL L4 / 5 performed by Melissa Montana MD at [facility]  04/15/2021    MID BACK CYST LESION BIOPSY EXCISION    SKIN BIOPSY N/A 04/15/2021    MID BACK CYST LESION BIOPSY EXCISION performed by Sai Gutiérrez DO at [address] EXTRACTION         Social History     Socioeconomic History    Marital status: Single     Spouse name: None    Number of children: None    Years of education: None    Highest education level: None   Tobacco Use    Smoking status: Former     Types: Cigarettes     Quit date: 2016     Years since quittin.9    Smokeless tobacco: Never   Vaping Use    Vaping Use: Former   Substance and Sexual Activity    Alcohol use: Never     Alcohol/week: 0.0 standard drinks    Drug use: No    Sexual activity: Yes     Partners: Male       Family History   Problem Relation Age of Onset    No Known Problems Mother     Heart Disease Father     Diabetes Father     Other Sister         sepsis in blood stream and pneumonia    Alcohol Abuse Brother     Drug Abuse Brother     Alcohol Abuse Brother     Drug Abuse Brother     Diabetes Maternal Grandmother     Diabetes Maternal Grandfather     Diabetes Paternal Grandmother     Diabetes Paternal Grandfather     Breast Cancer Neg Hx     Colon Cancer Neg Hx     Ovarian Cancer Neg Hx        Allergies   Allergen Reactions    Cat Hair Extract Other (See Comments)     Stuffy nose    Dust Mite Extract Other (See Comments)     Stuffy nose    Molds & Smuts Other (See Comments)     Stuffy nose    Seasonal Other (See Comments)     Stuffy nose       Vitals:    12/20/22 1547   BP: 130/89   Pulse: 92   SpO2: 98%       Current Outpatient Medications   Medication Sig Dispense Refill    tiZANidine (ZANAFLEX) 4 MG tablet TAKE 1 TABLET BY MOUTH TWICE DAILY AS NEEDED FOR SPAMS 60 tablet 1    lidocaine (LIDODERM) 5 % USE 1 PATCH EXTERNALLY ONCE DAILY AS NEEDED 12 HOURS ON, 12 HOURS OFF 30 patch 1    Rimegepant Sulfate 75 MG TBDP Take 1 each by mouth daily (Patient not taking: Reported on 12/20/2022) 30 tablet 0    pantoprazole (PROTONIX) 40 MG tablet Take 40 mg by mouth daily      CAPLYTA 42 MG CAPS Take 42 mg by mouth daily      SUMAtriptan (IMITREX) 50 MG tablet Take 1 tablet by mouth once as needed for Migraine 9 tablet 5    celecoxib (CELEBREX) 200 MG capsule Take 1 capsule by mouth daily 30 capsule 2    Semaglutide-Weight Management (WEGOVY) 0.25 MG/0.5ML SOAJ SC injection Inject 0.25 mg into the skin every 7 days (Patient not taking: Reported on 12/20/2022) 2 mL 0    Melatonin 10 MG TABS Take 30 mg by mouth      phentermine 37.5 MG capsule Take 37.5 mg by mouth every morning. (Patient not taking: Reported on 12/20/2022)      clobetasol (TEMOVATE) 0.05 % cream Apply topically 2 times daily. , as needed, for itching or scaling. 60 g 3    clobetasol (TEMOVATE) 0.05 % external solution Apply topically 2 times daily, as needed. 50 mL 2    buPROPion (WELLBUTRIN XL) 300 MG extended release tablet TAKE 1 TABLET BY MOUTH IN THE MORNING      venlafaxine (EFFEXOR) 75 MG tablet Take 75 mg by mouth daily      Adalimumab (HUMIRA PEN) 40 MG/0.4ML PNKT Inject 40 mg SC q2wk 2 each 2    hydrOXYzine HCl (ATARAX) 50 MG tablet Take 50 mg by mouth in the morning and at bedtime       No current facility-administered medications for this visit. Review of Systems   Constitutional:  Negative for fever. Objective:  General Appearance: In no acute distress and in pain.     Vital signs: (most recent): Blood pressure 130/89, pulse 92, height 5' 3\" (1.6 m), weight 246 lb (111.6 kg), last menstrual period 12/18/2022, SpO2 98 %, not currently breastfeeding. Vital signs are normal.  No fever. Output: Producing urine and producing stool. Lungs:  Normal effort. She is not in respiratory distress. Heart: Normal rate. Neurological: Patient is alert and oriented to person, place and time. Assessment & Plan  Chronic pain, located in the low back and bilateral lower extremity  Is stable with average 6/10 aggravates with excessive activity  Pending spinal cord stimulator implant by neurosurgery  Medication refill ordered    1. Chronic bilateral low back pain with bilateral sciatica        No orders of the defined types were placed in this encounter.      Orders Placed This Encounter   Medications    tiZANidine (ZANAFLEX) 4 MG tablet     Sig: TAKE 1 TABLET BY MOUTH TWICE DAILY AS NEEDED FOR SPAMS     Dispense:  60 tablet     Refill:  1    lidocaine (LIDODERM) 5 %     Sig: USE 1 PATCH EXTERNALLY ONCE DAILY AS NEEDED 12 HOURS ON, 12 HOURS OFF     Dispense:  30 patch     Refill:  1              Electronically signed by Fiona Rodriges MD on 12/20/2022 at 4:49 PM

## 2022-12-20 NOTE — TELEPHONE ENCOUNTER
Patient's PA was closed for Rimegepant Sulfate 75 MG TBDP     Prior Authorization not required for patient/medication   Case ID: [id number]      Payer:  [facility] Payer    [phone]    [facility] has predicted that this prior auth will not be required.

## 2022-12-20 NOTE — TELEPHONE ENCOUNTER
I did call in a new patient to I did call in a new medication to her pharmacy for migraines/Nurtec. I am not sure if this will be paid by her insurance. Does the patient want to see neurologist?  How is the blood pressure? Any other symptoms of the dizziness? Thank you. Please make an appointment if needed.

## 2022-12-20 NOTE — H&P (VIEW-ONLY)
History and Physical    Pt Name: Lenore Guerra  MRN: [id number]  YOB: 1990  Date of evaluation: 12/20/2022  Primary Care Physician: Eileen Cristina MD    SUBJECTIVE:   History of Chief Complaint:    Lenore Guerra is a 29 y.o. female who presents for PAT appointment. Patient complains of back and leg pain persistently and failed conserative measures. She states symptoms started late 2016. Successful trial with SCS per Dr. Verito Bingham  Patient has been scheduled for THORACIC SPINAL CORD STIMULATOR IMPLANTATION   Allergies  is allergic to cat hair extract, dust mite extract, molds & smuts, and seasonal.  Medications  Prior to Admission medications    Medication Sig Start Date End Date Taking? Authorizing Provider   Rimegepant Sulfate 75 MG TBDP Take 1 each by mouth daily  Patient not taking: Reported on 12/20/2022 12/20/22   Eileen Cristina MD   pantoprazole (PROTONIX) 40 MG tablet Take 40 mg by mouth daily 12/17/22   Historical Provider, MD   CAPLYTA 42 MG CAPS Take 42 mg by mouth daily 12/4/22   Historical Provider, MD   SUMAtriptan (IMITREX) 50 MG tablet Take 1 tablet by mouth once as needed for Migraine 11/8/22 12/20/22  Eileen Cristina MD   celecoxib (CELEBREX) 200 MG capsule Take 1 capsule by mouth daily 10/31/22 12/20/22  FUNMILAYO Marcelino - CNP   Semaglutide-Weight Management (WEGOVY) 0.25 MG/0.5ML SOAJ SC injection Inject 0.25 mg into the skin every 7 days  Patient not taking: Reported on 12/20/2022 10/27/22   Eileen Cristina MD   Melatonin 10 MG TABS Take 30 mg by mouth    Historical Provider, MD   phentermine 37.5 MG capsule Take 37.5 mg by mouth every morning. Patient not taking: Reported on 12/20/2022    Historical Provider, MD   tiZANidine (ZANAFLEX) 4 MG tablet TAKE 1 TABLET BY MOUTH TWICE DAILY AS NEEDED FOR SPAMS 8/1/22   Historical Provider, MD   clobetasol (TEMOVATE) 0.05 % cream Apply topically 2 times daily. , as needed, for itching or scaling.  9/9/22   Oliver Morales PA-C   clobetasol (TEMOVATE) 0.05 % external solution Apply topically 2 times daily, as needed. 9/9/22   Ashley Garcia PA-C   buPROPion (WELLBUTRIN XL) 300 MG extended release tablet TAKE 1 TABLET BY MOUTH IN THE MORNING 6/13/22   Historical Provider, MD   lidocaine (LIDODERM) 5 % USE 1 PATCH EXTERNALLY ONCE DAILY AS NEEDED 12 HOURS ON, 12 HOURS OFF  Patient not taking: Reported on 12/20/2022 6/29/22   Historical Provider, MD   venlafaxine (EFFEXOR) 75 MG tablet Take 75 mg by mouth daily 7/7/22   Historical Provider, MD   Adalimumab (HUMIRA PEN) 40 MG/0.4ML PNKT Inject 40 mg SC q2wk 7/13/22   Ashley Garcia PA-C   hydrOXYzine HCl (ATARAX) 50 MG tablet Take 50 mg by mouth in the morning and at bedtime 1/25/22   Historical Provider, MD     Past Medical History    has a past medical history of ADHD, Anxiety, Arthritis, Back pain, Bipolar 1 disorder, depressed ([address] Brain injury, COVID-19 vaccine series not administered, DDD (degenerative disc disease), lumbar, Depression, Fibromyalgia, Hearing loss, Knee pain, Learning disability, Lumbar spondylosis, Migraine headache, MTHFR (methylene THF reductase) deficiency and homocystinuria ([facility] [address] Obese, Prediabetes, Psoriasis, RLS (restless legs syndrome), Under care of service provider, Under care of service provider, Under care of service provider, Under care of service provider, and Under care of service provider. Past Surgical History   has a past surgical history that includes Tonsillectomy; [place] tooth extraction; Pain management procedure (Bilateral, 12/14/2020); Pain management procedure (Bilateral, 01/04/2021); other surgical history (Bilateral, 03/25/2021); Pain management procedure (Bilateral, 03/25/2021); skin biopsy (04/15/2021); skin biopsy (N/A, 04/15/2021); other surgical history; Knee arthroscopy (Left); and Adenoidectomy. Social History   reports that she quit smoking about 6 years ago. Her smoking use included cigarettes.  She has never used smokeless tobacco.    reports no history of alcohol use. reports no history of drug use. Marital Status single  Children none  Occupation none  Family History  Family Status   Relation Name Status    Mother  Alive    Father  Alive    Sister      Brother  (Not Specified)    Brother  (Not Specified)    MGM      MGF      PGM  Alive    PGF      Neg Hx  (Not Specified)     family history includes Alcohol Abuse in her brother and brother; Diabetes in her father, maternal grandfather, maternal grandmother, paternal grandfather, and paternal grandmother; Drug Abuse in her brother and brother; Heart Disease in her father; No Known Problems in her mother; Other in her sister. Review of Systems:  CONSTITUTIONAL:   negative for fevers, chills, fatigue and malaise    EYES:   negative for double vision, blurred vision and photophobia    HEENT:   negative for tinnitus, epistaxis and sore throat     RESPIRATORY:   negative for cough, shortness of breath, wheezing     CARDIOVASCULAR:   negative for chest pain, palpitations, syncope, edema     GASTROINTESTINAL:   negative for nausea, vomiting     GENITOURINARY:   negative for incontinence     MUSCULOSKELETAL:   +back pain, +bilateral leg pain +pain today 7/10   NEUROLOGICAL:   + for weakness and tingling to BLE with prolonged positions  +migraines   PSYCHIATRIC:   negative for anxiety  +hx bipolar- stable     OBJECTIVE:   VITALS:  height is 5' 3\" (1.6 m) and weight is 246 lb (111.6 kg). Her temporal temperature is 96.8 °F (36 °C). Her blood pressure is 121/82 and her pulse is 67. Her respiration is 18 and oxygen saturation is 98%. CONSTITUTIONAL:alert & oriented x 3, no acute distress. Friendly. Pleasant and talkative. SKIN:  Warm and dry, no rashes on exposed areas of skin   HEAD:  Normocephalic, atraumatic   EYES: EOMs intact. PERRL. EARS:  Equal bilaterally, no edema or thickening, skin is intact without lumps or lesions. No discharge. Hearing grossly WNL.     NOSE: Nares patent. No rhinorrhea   MOUTH/THROAT:  Mucous membranes moist, tongue is pink, uvula midline, teeth appear to be intact, has left upper central incisor cap intact. NECK:full ROM  LUNGS: Respirations even and non-labored. Clear to auscultation bilaterally, no wheezes, rales, or rhonchi. CARDIOVASCULAR: Regular rate and rhythm, no murmurs/rubs/gallops   ABDOMEN: soft, non-tender, non-distended, bowel sounds active x 4   EXTREMITIES: No edema bilateral lower extremities. No varicosities bilateral lower extremities. NEUROLOGIC: CN II-XII are grossly intact. Gait not assessed. Testing:   EK22  Labs pending: drawn 2022   IMPRESSIONS:   Lumbar spondylosis with radiculpathy.   PLANS:   THORACIC SPINAL CORD STIMULATOR IMPLANTATION     FUNMILAYO JACINTO CNP  Electronically signed 2022 at 10:46 AM

## 2022-12-20 NOTE — PROGRESS NOTES
The patient is a 29 y. o. Non- / non  female. Chief Complaint   Patient presents with    Back Pain        . kellie        Past Medical History:   Diagnosis Date    ADHD     Anxiety     Arthritis     Back pain     Bipolar 1 disorder, depressed ([address]     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    COVID-19 vaccine series not administered     DDD (degenerative disc disease), lumbar     Depression     Fibromyalgia     GERD (gastroesophageal reflux disease)     Hearing loss     Knee pain     Learning disability     Lumbar spondylosis     with radiculopathy    Migraine headache     MTHFR (methylene THF reductase) deficiency and homocystinuria ([address]     Obese     Prediabetes     Psoriasis     on Humira,  to use last dose on 12/22/22 per dermatologist for upcoming SCS surgery    RLS (restless legs syndrome)     Under care of service provider 12/20/2022    aep-Mhcruflo-zcanxoxClari silva-last visit oct 2022    Under care of service provider 12/20/2022    gi-estrella hemphill-last visit nov 2022    Under care of service provider 12/20/2022    pain-Aundrea Jorge-last visit dec 2022    Under care of service provider 12/20/2022    behavioral health-lázaro turner- [facility]-last visit dec 2022    Under care of service provider 12/20/2022    akvvpczjo-kyermiw-ba vincent-last visit dec 2022        Past Surgical History:   Procedure Laterality Date    ADENOIDECTOMY      KNEE ARTHROSCOPY Left     OTHER SURGICAL HISTORY Bilateral 03/25/2021    lumbar DENIZ    OTHER SURGICAL HISTORY      bone spurs removed from both feet    PAIN MANAGEMENT PROCEDURE Bilateral 12/14/2020    EPIDURAL STEROID INJECTION BILATERAL L5 performed by Sotero Guallpa MD at [address] [place] [address] Bilateral 01/04/2021    EPIDURAL STEROID INJECTION BILATERAL L5 performed by Sotero Guallpa MD at [address] [place] [address] Bilateral 03/25/2021    LUMBAR FACET STEROID INJECTION BILATERAL L4 / 5 performed by Gilbert Luke MD at [facility]  04/15/2021    MID BACK CYST LESION BIOPSY EXCISION    SKIN BIOPSY N/A 04/15/2021    MID BACK CYST LESION BIOPSY EXCISION performed by Simi Ho DO at [address] [place] [address] Place      WISDOM TOOTH EXTRACTION         Social History     Socioeconomic History    Marital status: Single     Spouse name: None    Number of children: None    Years of education: None    Highest education level: None   Tobacco Use    Smoking status: Former     Types: Cigarettes     Quit date: 2016     Years since quittin.9    Smokeless tobacco: Never   Vaping Use    Vaping Use: Former   Substance and Sexual Activity    Alcohol use: Never     Alcohol/week: 0.0 standard drinks    Drug use: No    Sexual activity: Yes     Partners: Male       Family History   Problem Relation Age of Onset    No Known Problems Mother     Heart Disease Father     Diabetes Father     Other Sister         sepsis in blood stream and pneumonia    Alcohol Abuse Brother     Drug Abuse Brother     Alcohol Abuse Brother     Drug Abuse Brother     Diabetes Maternal Grandmother     Diabetes Maternal Grandfather     Diabetes Paternal Grandmother     Diabetes Paternal Grandfather     Breast Cancer Neg Hx     Colon Cancer Neg Hx     Ovarian Cancer Neg Hx        Allergies   Allergen Reactions    Cat Hair Extract Other (See Comments)     Stuffy nose    Dust Mite Extract Other (See Comments)     Stuffy nose    Molds & Smuts Other (See Comments)     Stuffy nose    Seasonal Other (See Comments)     Stuffy nose       There were no vitals filed for this visit.     Current Outpatient Medications   Medication Sig Dispense Refill    Rimegepant Sulfate 75 MG TBDP Take 1 each by mouth daily (Patient not taking: Reported on 2022) 30 tablet 0    pantoprazole (PROTONIX) 40 MG tablet Take 40 mg by mouth daily      CAPLYTA 42 MG CAPS Take 42 mg by mouth daily      SUMAtriptan (IMITREX) 50 MG tablet Take 1 tablet by mouth once as needed for Migraine 9 tablet 5    celecoxib (CELEBREX) 200 MG capsule Take 1 capsule by mouth daily 30 capsule 2    Semaglutide-Weight Management (WEGOVY) 0.25 MG/0.5ML SOAJ SC injection Inject 0.25 mg into the skin every 7 days (Patient not taking: Reported on 12/20/2022) 2 mL 0    Melatonin 10 MG TABS Take 30 mg by mouth      phentermine 37.5 MG capsule Take 37.5 mg by mouth every morning. (Patient not taking: Reported on 12/20/2022)      tiZANidine (ZANAFLEX) 4 MG tablet TAKE 1 TABLET BY MOUTH TWICE DAILY AS NEEDED FOR SPAMS      clobetasol (TEMOVATE) 0.05 % cream Apply topically 2 times daily. , as needed, for itching or scaling. 60 g 3    clobetasol (TEMOVATE) 0.05 % external solution Apply topically 2 times daily, as needed. 50 mL 2    buPROPion (WELLBUTRIN XL) 300 MG extended release tablet TAKE 1 TABLET BY MOUTH IN THE MORNING      lidocaine (LIDODERM) 5 % USE 1 PATCH EXTERNALLY ONCE DAILY AS NEEDED 12 HOURS ON, 12 HOURS OFF (Patient not taking: Reported on 12/20/2022)      venlafaxine (EFFEXOR) 75 MG tablet Take 75 mg by mouth daily      Adalimumab (HUMIRA PEN) 40 MG/0.4ML PNKT Inject 40 mg SC q2wk 2 each 2    hydrOXYzine HCl (ATARAX) 50 MG tablet Take 50 mg by mouth in the morning and at bedtime       No current facility-administered medications for this visit. Review of Systems      Objective:  Vital signs: (most recent): Height 5' 3\" (1.6 m), weight 246 lb (111.6 kg), last menstrual period 12/18/2022, not currently breastfeeding. Assessment & Plan  No diagnosis found. No orders of the defined types were placed in this encounter. No orders of the defined types were placed in this encounter.            Electronically signed by Yenifer Harper MA on 12/20/2022 at 3:48 PM

## 2022-12-20 NOTE — PROGRESS NOTES
Lumbar spondylosis     with radiculopathy    Migraine headache     MTHFR (methylene THF reductase) deficiency and homocystinuria (HCC)     Obese     Prediabetes     Psoriasis     on Humira,  to use last dose on 12/22/22 per dermatologist for upcoming SCS surgery    RLS (restless legs syndrome)     Under care of service provider 12/20/2022    bla-Xnqnlmrd-oswluqz sylvainia-last visit oct 2022    Under care of service provider 12/20/2022    gi-estrella [address]-last visit nov 2022    Under care of service provider 12/20/2022    pain-Aundrea Millard-fantasmaia-last visit dec 2022    Under care of service provider 12/20/2022    behavioral health-lázaro millan-keshav- [facility]-last visit dec 2022    Under care of service provider 12/20/2022    jnekmvewn-vrrzety-vz vincent-last visit dec 2022     Patient was evaluated in PAT & anesthesia guidelines were applied. NPO guidelines, medication instructions and scheduled arrival time were reviewed with patient. I advised patient/patient family to please contact surgeons office, ahead of time if possible, if any new signs or symptoms of illness, infection, rash, etc. Patient/ patient family verbalize understanding. Anesthesia contacted:   no    Medical or cardiac clearance ordered: no    Pt states PCP aware of upcoming procedure.      FUNMILAYO Andino CNP  Electronically signed 12/20/2022 at 10:01 AM

## 2022-12-20 NOTE — H&P
History and Physical    Pt Name: Mamta Mariano  MRN: [id number]  YOB: 1990  Date of evaluation: 12/20/2022  Primary Care Physician: Lakshmi Álvarez MD    SUBJECTIVE:   History of Chief Complaint:    Mamta Mariano is a 29 y.o. female who presents for PAT appointment. Patient complains of back and leg pain persistently and failed conserative measures. She states symptoms started late 2016. Successful trial with SCS per Dr. Myriam Weiss  Patient has been scheduled for THORACIC SPINAL CORD STIMULATOR IMPLANTATION   Allergies  is allergic to cat hair extract, dust mite extract, molds & smuts, and seasonal.  Medications  Prior to Admission medications    Medication Sig Start Date End Date Taking? Authorizing Provider   Rimegepant Sulfate 75 MG TBDP Take 1 each by mouth daily  Patient not taking: Reported on 12/20/2022 12/20/22   Lakshmi Álvarez MD   pantoprazole (PROTONIX) 40 MG tablet Take 40 mg by mouth daily 12/17/22   Historical Provider, MD   CAPLYTA 42 MG CAPS Take 42 mg by mouth daily 12/4/22   Historical Provider, MD   SUMAtriptan (IMITREX) 50 MG tablet Take 1 tablet by mouth once as needed for Migraine 11/8/22 12/20/22  Lakshmi Álvarez MD   celecoxib (CELEBREX) 200 MG capsule Take 1 capsule by mouth daily 10/31/22 12/20/22  FUNMILAYO Cueto - CNP   Semaglutide-Weight Management (WEGOVY) 0.25 MG/0.5ML SOAJ SC injection Inject 0.25 mg into the skin every 7 days  Patient not taking: Reported on 12/20/2022 10/27/22   Lakshmi Álvarez MD   Melatonin 10 MG TABS Take 30 mg by mouth    Historical Provider, MD   phentermine 37.5 MG capsule Take 37.5 mg by mouth every morning. Patient not taking: Reported on 12/20/2022    Historical Provider, MD   tiZANidine (ZANAFLEX) 4 MG tablet TAKE 1 TABLET BY MOUTH TWICE DAILY AS NEEDED FOR SPAMS 8/1/22   Historical Provider, MD   clobetasol (TEMOVATE) 0.05 % cream Apply topically 2 times daily. , as needed, for itching or scaling.  9/9/22   Lizbeth Chan PA-C   clobetasol (TEMOVATE) 0.05 % external solution Apply topically 2 times daily, as needed. 9/9/22   Angela Nguyen PA-C   buPROPion (WELLBUTRIN XL) 300 MG extended release tablet TAKE 1 TABLET BY MOUTH IN THE MORNING 6/13/22   Historical Provider, MD   lidocaine (LIDODERM) 5 % USE 1 PATCH EXTERNALLY ONCE DAILY AS NEEDED 12 HOURS ON, 12 HOURS OFF  Patient not taking: Reported on 12/20/2022 6/29/22   Historical Provider, MD   venlafaxine (EFFEXOR) 75 MG tablet Take 75 mg by mouth daily 7/7/22   Historical Provider, MD   Adalimumab (HUMIRA PEN) 40 MG/0.4ML PNKT Inject 40 mg SC q2wk 7/13/22   Angela Nguyen PA-C   hydrOXYzine HCl (ATARAX) 50 MG tablet Take 50 mg by mouth in the morning and at bedtime 1/25/22   Historical Provider, MD     Past Medical History    has a past medical history of ADHD, Anxiety, Arthritis, Back pain, Bipolar 1 disorder, depressed ([address] Brain injury, COVID-19 vaccine series not administered, DDD (degenerative disc disease), lumbar, Depression, Fibromyalgia, Hearing loss, Knee pain, Learning disability, Lumbar spondylosis, Migraine headache, MTHFR (methylene THF reductase) deficiency and homocystinuria ([facility] [place] [address] Obese, Prediabetes, Psoriasis, RLS (restless legs syndrome), Under care of service provider, Under care of service provider, Under care of service provider, Under care of service provider, and Under care of service provider. Past Surgical History   has a past surgical history that includes Tonsillectomy; [place] tooth extraction; Pain management procedure (Bilateral, 12/14/2020); Pain management procedure (Bilateral, 01/04/2021); other surgical history (Bilateral, 03/25/2021); Pain management procedure (Bilateral, 03/25/2021); skin biopsy (04/15/2021); skin biopsy (N/A, 04/15/2021); other surgical history; Knee arthroscopy (Left); and Adenoidectomy. Social History   reports that she quit smoking about 6 years ago. Her smoking use included cigarettes.  She has never used smokeless tobacco.    reports no history of alcohol use. reports no history of drug use. Marital Status single  Children none  Occupation none  Family History  Family Status   Relation Name Status    Mother  Alive    Father  Alive    Sister      Brother  (Not Specified)    Brother  (Not Specified)    MGM      MGF      PGM  Alive    PGF      Neg Hx  (Not Specified)     family history includes Alcohol Abuse in her brother and brother; Diabetes in her father, maternal grandfather, maternal grandmother, paternal grandfather, and paternal grandmother; Drug Abuse in her brother and brother; Heart Disease in her father; No Known Problems in her mother; Other in her sister. Review of Systems:  CONSTITUTIONAL:   negative for fevers, chills, fatigue and malaise    EYES:   negative for double vision, blurred vision and photophobia    HEENT:   negative for tinnitus, epistaxis and sore throat     RESPIRATORY:   negative for cough, shortness of breath, wheezing     CARDIOVASCULAR:   negative for chest pain, palpitations, syncope, edema     GASTROINTESTINAL:   negative for nausea, vomiting     GENITOURINARY:   negative for incontinence     MUSCULOSKELETAL:   +back pain, +bilateral leg pain +pain today 7/10   NEUROLOGICAL:   + for weakness and tingling to BLE with prolonged positions  +migraines   PSYCHIATRIC:   negative for anxiety  +hx bipolar- stable     OBJECTIVE:   VITALS:  height is 5' 3\" (1.6 m) and weight is 246 lb (111.6 kg). Her temporal temperature is 96.8 °F (36 °C). Her blood pressure is 121/82 and her pulse is 67. Her respiration is 18 and oxygen saturation is 98%. CONSTITUTIONAL:alert & oriented x 3, no acute distress. Friendly. Pleasant and talkative. SKIN:  Warm and dry, no rashes on exposed areas of skin   HEAD:  Normocephalic, atraumatic   EYES: EOMs intact. PERRL. EARS:  Equal bilaterally, no edema or thickening, skin is intact without lumps or lesions. No discharge. Hearing grossly WNL.     NOSE: Nares patent. No rhinorrhea   MOUTH/THROAT:  Mucous membranes moist, tongue is pink, uvula midline, teeth appear to be intact, has left upper central incisor cap intact. NECK:full ROM  LUNGS: Respirations even and non-labored. Clear to auscultation bilaterally, no wheezes, rales, or rhonchi. CARDIOVASCULAR: Regular rate and rhythm, no murmurs/rubs/gallops   ABDOMEN: soft, non-tender, non-distended, bowel sounds active x 4   EXTREMITIES: No edema bilateral lower extremities. No varicosities bilateral lower extremities. NEUROLOGIC: CN II-XII are grossly intact. Gait not assessed. Testing:   EK22  Labs pending: drawn 2022   IMPRESSIONS:   Lumbar spondylosis with radiculpathy.   PLANS:   THORACIC SPINAL CORD STIMULATOR IMPLANTATION     FUNMILAYO JACINTO CNP  Electronically signed 2022 at 10:46 AM

## 2022-12-21 LAB
EKG ATRIAL RATE: 75 BPM
EKG P AXIS: 62 DEGREES
EKG P-R INTERVAL: 134 MS
EKG Q-T INTERVAL: 420 MS
EKG QRS DURATION: 86 MS
EKG QTC CALCULATION (BAZETT): 469 MS
EKG R AXIS: 16 DEGREES
EKG T AXIS: 3 DEGREES
EKG VENTRICULAR RATE: 75 BPM

## 2022-12-21 PROCEDURE — 93010 ELECTROCARDIOGRAM REPORT: CPT | Performed by: INTERNAL MEDICINE

## 2023-01-03 ENCOUNTER — HOSPITAL ENCOUNTER (OUTPATIENT)
Age: 33
Setting detail: OUTPATIENT SURGERY
Discharge: HOME | End: 2023-01-03
Attending: NEUROLOGICAL SURGERY | Admitting: NEUROLOGICAL SURGERY
Payer: MEDICARE

## 2023-01-03 ENCOUNTER — APPOINTMENT (OUTPATIENT)
Dept: GENERAL RADIOLOGY | Age: 33
End: 2023-01-03
Attending: NEUROLOGICAL SURGERY
Payer: MEDICARE

## 2023-01-03 ENCOUNTER — ANESTHESIA (OUTPATIENT)
Dept: OPERATING ROOM | Age: 33
End: 2023-01-03
Payer: MEDICARE

## 2023-01-03 ENCOUNTER — ANESTHESIA EVENT (OUTPATIENT)
Dept: OPERATING ROOM | Age: 33
End: 2023-01-03
Payer: MEDICARE

## 2023-01-03 VITALS
RESPIRATION RATE: 14 BRPM | OXYGEN SATURATION: 98 % | DIASTOLIC BLOOD PRESSURE: 81 MMHG | HEART RATE: 74 BPM | TEMPERATURE: 96.8 F | SYSTOLIC BLOOD PRESSURE: 122 MMHG

## 2023-01-03 DIAGNOSIS — Z96.89 SPINAL CORD STIMULATOR STATUS: Primary | ICD-10-CM

## 2023-01-03 LAB — HCG, PREGNANCY URINE (POC): NEGATIVE

## 2023-01-03 PROCEDURE — C1713 ANCHOR/SCREW BN/BN,TIS/BN: HCPCS | Performed by: NEUROLOGICAL SURGERY

## 2023-01-03 PROCEDURE — 6360000002 HC RX W HCPCS

## 2023-01-03 PROCEDURE — 3209999900 FLUORO FOR SURGICAL PROCEDURES

## 2023-01-03 PROCEDURE — 3700000000 HC ANESTHESIA ATTENDED CARE: Performed by: NEUROLOGICAL SURGERY

## 2023-01-03 PROCEDURE — C1889 IMPLANT/INSERT DEVICE, NOC: HCPCS | Performed by: NEUROLOGICAL SURGERY

## 2023-01-03 PROCEDURE — 6370000000 HC RX 637 (ALT 250 FOR IP)

## 2023-01-03 PROCEDURE — 3600000014 HC SURGERY LEVEL 4 ADDTL 15MIN: Performed by: NEUROLOGICAL SURGERY

## 2023-01-03 PROCEDURE — C1778 LEAD, NEUROSTIMULATOR: HCPCS | Performed by: NEUROLOGICAL SURGERY

## 2023-01-03 PROCEDURE — 63685 INS/RPLC SPI NPG/RCVR POCKET: CPT | Performed by: NEUROLOGICAL SURGERY

## 2023-01-03 PROCEDURE — 2709999900 HC NON-CHARGEABLE SUPPLY: Performed by: NEUROLOGICAL SURGERY

## 2023-01-03 PROCEDURE — APPSS15 APP SPLIT SHARED TIME 0-15 MINUTES: Performed by: NURSE PRACTITIONER

## 2023-01-03 PROCEDURE — 63655 IMPLANT NEUROELECTRODES: CPT | Performed by: NEUROLOGICAL SURGERY

## 2023-01-03 PROCEDURE — 2580000003 HC RX 258

## 2023-01-03 PROCEDURE — C9290 INJ, BUPIVACAINE LIPOSOME: HCPCS | Performed by: NEUROLOGICAL SURGERY

## 2023-01-03 PROCEDURE — 3700000001 HC ADD 15 MINUTES (ANESTHESIA): Performed by: NEUROLOGICAL SURGERY

## 2023-01-03 PROCEDURE — C1822 GEN, NEURO, HF, RECHG BAT: HCPCS | Performed by: NEUROLOGICAL SURGERY

## 2023-01-03 PROCEDURE — 3600000004 HC SURGERY LEVEL 4 BASE: Performed by: NEUROLOGICAL SURGERY

## 2023-01-03 PROCEDURE — 7100000010 HC PHASE II RECOVERY - FIRST 15 MIN: Performed by: NEUROLOGICAL SURGERY

## 2023-01-03 PROCEDURE — 2580000003 HC RX 258: Performed by: ANESTHESIOLOGY

## 2023-01-03 PROCEDURE — 6360000002 HC RX W HCPCS: Performed by: NEUROLOGICAL SURGERY

## 2023-01-03 PROCEDURE — 7100000011 HC PHASE II RECOVERY - ADDTL 15 MIN: Performed by: NEUROLOGICAL SURGERY

## 2023-01-03 PROCEDURE — C9290 INJ, BUPIVACAINE LIPOSOME: HCPCS

## 2023-01-03 PROCEDURE — 6360000002 HC RX W HCPCS: Performed by: ANESTHESIOLOGY

## 2023-01-03 PROCEDURE — 2500000003 HC RX 250 WO HCPCS

## 2023-01-03 PROCEDURE — 2580000003 HC RX 258: Performed by: NEUROLOGICAL SURGERY

## 2023-01-03 PROCEDURE — 7100000001 HC PACU RECOVERY - ADDTL 15 MIN: Performed by: NEUROLOGICAL SURGERY

## 2023-01-03 PROCEDURE — 2500000003 HC RX 250 WO HCPCS: Performed by: NEUROLOGICAL SURGERY

## 2023-01-03 PROCEDURE — 81025 URINE PREGNANCY TEST: CPT

## 2023-01-03 PROCEDURE — 2720000010 HC SURG SUPPLY STERILE: Performed by: NEUROLOGICAL SURGERY

## 2023-01-03 PROCEDURE — 6370000000 HC RX 637 (ALT 250 FOR IP): Performed by: NEUROLOGICAL SURGERY

## 2023-01-03 PROCEDURE — 7100000000 HC PACU RECOVERY - FIRST 15 MIN: Performed by: NEUROLOGICAL SURGERY

## 2023-01-03 DEVICE — IMPLANTABLE DEVICE: Type: IMPLANTABLE DEVICE | Site: SPINE THORACIC | Status: FUNCTIONAL

## 2023-01-03 DEVICE — SURGICAL LEAD KIT, 70CM
Type: IMPLANTABLE DEVICE | Site: SPINE THORACIC | Status: FUNCTIONAL
Brand: NEVRO®

## 2023-01-03 DEVICE — NEURO SCREWS, CROSS-PIN, SELF-DRILLING: Type: IMPLANTABLE DEVICE | Site: SPINE THORACIC | Status: FUNCTIONAL

## 2023-01-03 DEVICE — N300 LEAD ANCHOR KIT
Type: IMPLANTABLE DEVICE | Site: SPINE THORACIC | Status: FUNCTIONAL
Brand: NEVRO®

## 2023-01-03 DEVICE — LOW PROFILE PLATE
Type: IMPLANTABLE DEVICE | Site: SPINE THORACIC | Status: FUNCTIONAL
Brand: UNIVERSAL NEURO 2

## 2023-01-03 DEVICE — SENZA II
Type: IMPLANTABLE DEVICE | Site: SPINE THORACIC | Status: FUNCTIONAL
Brand: NEVRO

## 2023-01-03 DEVICE — LOW PROFILE PLATE, WITH TAB
Type: IMPLANTABLE DEVICE | Site: SPINE THORACIC | Status: FUNCTIONAL
Brand: UNIVERSAL NEURO 2

## 2023-01-03 RX ORDER — CYCLOBENZAPRINE HCL 10 MG
10 TABLET ORAL 3 TIMES DAILY PRN
Qty: 30 TABLET | Refills: 0 | Status: SHIPPED | OUTPATIENT
Start: 2023-01-03 | End: 2023-01-13

## 2023-01-03 RX ORDER — METOCLOPRAMIDE HYDROCHLORIDE 5 MG/ML
10 INJECTION INTRAMUSCULAR; INTRAVENOUS
Status: DISCONTINUED | OUTPATIENT
Start: 2023-01-03 | End: 2023-01-03 | Stop reason: HOSPADM

## 2023-01-03 RX ORDER — CEPHALEXIN 500 MG/1
500 CAPSULE ORAL 3 TIMES DAILY
Qty: 9 CAPSULE | Refills: 0 | Status: SHIPPED | OUTPATIENT
Start: 2023-01-03 | End: 2023-01-06

## 2023-01-03 RX ORDER — SODIUM CHLORIDE 0.9 % (FLUSH) 0.9 %
5-40 SYRINGE (ML) INJECTION EVERY 12 HOURS SCHEDULED
Status: DISCONTINUED | OUTPATIENT
Start: 2023-01-03 | End: 2023-01-03 | Stop reason: HOSPADM

## 2023-01-03 RX ORDER — PROPOFOL 10 MG/ML
INJECTION, EMULSION INTRAVENOUS PRN
Status: DISCONTINUED | OUTPATIENT
Start: 2023-01-03 | End: 2023-01-03 | Stop reason: SDUPTHER

## 2023-01-03 RX ORDER — LABETALOL HYDROCHLORIDE 5 MG/ML
INJECTION, SOLUTION INTRAVENOUS PRN
Status: DISCONTINUED | OUTPATIENT
Start: 2023-01-03 | End: 2023-01-03 | Stop reason: SDUPTHER

## 2023-01-03 RX ORDER — MAGNESIUM HYDROXIDE 1200 MG/15ML
LIQUID ORAL CONTINUOUS PRN
Status: COMPLETED | OUTPATIENT
Start: 2023-01-03 | End: 2023-01-03

## 2023-01-03 RX ORDER — NEOSTIGMINE METHYLSULFATE 5 MG/5 ML
SYRINGE (ML) INTRAVENOUS PRN
Status: DISCONTINUED | OUTPATIENT
Start: 2023-01-03 | End: 2023-01-03 | Stop reason: SDUPTHER

## 2023-01-03 RX ORDER — OXYCODONE HYDROCHLORIDE AND ACETAMINOPHEN 5; 325 MG/1; MG/1
1 TABLET ORAL EVERY 4 HOURS PRN
Qty: 42 TABLET | Refills: 0 | Status: SHIPPED | OUTPATIENT
Start: 2023-01-03 | End: 2023-01-10

## 2023-01-03 RX ORDER — LIDOCAINE HYDROCHLORIDE AND EPINEPHRINE 10; 10 MG/ML; UG/ML
INJECTION, SOLUTION INFILTRATION; PERINEURAL PRN
Status: DISCONTINUED | OUTPATIENT
Start: 2023-01-03 | End: 2023-01-03 | Stop reason: ALTCHOICE

## 2023-01-03 RX ORDER — SODIUM CHLORIDE 0.9 % (FLUSH) 0.9 %
5-40 SYRINGE (ML) INJECTION PRN
Status: DISCONTINUED | OUTPATIENT
Start: 2023-01-03 | End: 2023-01-03 | Stop reason: HOSPADM

## 2023-01-03 RX ORDER — HYDRALAZINE HYDROCHLORIDE 20 MG/ML
10 INJECTION INTRAMUSCULAR; INTRAVENOUS
Status: DISCONTINUED | OUTPATIENT
Start: 2023-01-03 | End: 2023-01-03 | Stop reason: HOSPADM

## 2023-01-03 RX ORDER — MEPERIDINE HYDROCHLORIDE 50 MG/ML
12.5 INJECTION INTRAMUSCULAR; INTRAVENOUS; SUBCUTANEOUS EVERY 5 MIN PRN
Status: DISCONTINUED | OUTPATIENT
Start: 2023-01-03 | End: 2023-01-03 | Stop reason: HOSPADM

## 2023-01-03 RX ORDER — DIPHENHYDRAMINE HYDROCHLORIDE 50 MG/ML
12.5 INJECTION INTRAMUSCULAR; INTRAVENOUS
Status: DISCONTINUED | OUTPATIENT
Start: 2023-01-03 | End: 2023-01-03 | Stop reason: HOSPADM

## 2023-01-03 RX ORDER — SODIUM CHLORIDE 9 MG/ML
25 INJECTION, SOLUTION INTRAVENOUS PRN
Status: DISCONTINUED | OUTPATIENT
Start: 2023-01-03 | End: 2023-01-03 | Stop reason: HOSPADM

## 2023-01-03 RX ORDER — FENTANYL CITRATE 50 UG/ML
INJECTION, SOLUTION INTRAMUSCULAR; INTRAVENOUS PRN
Status: DISCONTINUED | OUTPATIENT
Start: 2023-01-03 | End: 2023-01-03 | Stop reason: SDUPTHER

## 2023-01-03 RX ORDER — SODIUM CHLORIDE, SODIUM LACTATE, POTASSIUM CHLORIDE, CALCIUM CHLORIDE 600; 310; 30; 20 MG/100ML; MG/100ML; MG/100ML; MG/100ML
1000 INJECTION, SOLUTION INTRAVENOUS CONTINUOUS
Status: DISCONTINUED | OUTPATIENT
Start: 2023-01-03 | End: 2023-01-03 | Stop reason: HOSPADM

## 2023-01-03 RX ORDER — ONDANSETRON 2 MG/ML
INJECTION INTRAMUSCULAR; INTRAVENOUS PRN
Status: DISCONTINUED | OUTPATIENT
Start: 2023-01-03 | End: 2023-01-03 | Stop reason: SDUPTHER

## 2023-01-03 RX ORDER — LIDOCAINE HYDROCHLORIDE 10 MG/ML
INJECTION, SOLUTION EPIDURAL; INFILTRATION; INTRACAUDAL; PERINEURAL PRN
Status: DISCONTINUED | OUTPATIENT
Start: 2023-01-03 | End: 2023-01-03 | Stop reason: SDUPTHER

## 2023-01-03 RX ORDER — SODIUM CHLORIDE, SODIUM LACTATE, POTASSIUM CHLORIDE, CALCIUM CHLORIDE 600; 310; 30; 20 MG/100ML; MG/100ML; MG/100ML; MG/100ML
INJECTION, SOLUTION INTRAVENOUS CONTINUOUS PRN
Status: DISCONTINUED | OUTPATIENT
Start: 2023-01-03 | End: 2023-01-03 | Stop reason: SDUPTHER

## 2023-01-03 RX ORDER — GLYCOPYRROLATE 0.2 MG/ML
INJECTION INTRAMUSCULAR; INTRAVENOUS PRN
Status: DISCONTINUED | OUTPATIENT
Start: 2023-01-03 | End: 2023-01-03 | Stop reason: SDUPTHER

## 2023-01-03 RX ORDER — OXYCODONE HYDROCHLORIDE AND ACETAMINOPHEN 5; 325 MG/1; MG/1
TABLET ORAL
Status: COMPLETED
Start: 2023-01-03 | End: 2023-01-03

## 2023-01-03 RX ORDER — DROPERIDOL 2.5 MG/ML
0.62 INJECTION, SOLUTION INTRAMUSCULAR; INTRAVENOUS
Status: DISCONTINUED | OUTPATIENT
Start: 2023-01-03 | End: 2023-01-03 | Stop reason: HOSPADM

## 2023-01-03 RX ORDER — OXYCODONE HYDROCHLORIDE AND ACETAMINOPHEN 5; 325 MG/1; MG/1
1 TABLET ORAL ONCE
Status: DISCONTINUED | OUTPATIENT
Start: 2023-01-03 | End: 2023-01-03 | Stop reason: HOSPADM

## 2023-01-03 RX ORDER — MIDAZOLAM HYDROCHLORIDE 1 MG/ML
INJECTION INTRAMUSCULAR; INTRAVENOUS PRN
Status: DISCONTINUED | OUTPATIENT
Start: 2023-01-03 | End: 2023-01-03 | Stop reason: SDUPTHER

## 2023-01-03 RX ORDER — ROCURONIUM BROMIDE 10 MG/ML
INJECTION, SOLUTION INTRAVENOUS PRN
Status: DISCONTINUED | OUTPATIENT
Start: 2023-01-03 | End: 2023-01-03 | Stop reason: SDUPTHER

## 2023-01-03 RX ORDER — PROPOFOL 10 MG/ML
INJECTION, EMULSION INTRAVENOUS CONTINUOUS PRN
Status: DISCONTINUED | OUTPATIENT
Start: 2023-01-03 | End: 2023-01-03 | Stop reason: SDUPTHER

## 2023-01-03 RX ORDER — OXYCODONE HYDROCHLORIDE AND ACETAMINOPHEN 5; 325 MG/1; MG/1
1 TABLET ORAL ONCE
Status: DISCONTINUED | OUTPATIENT
Start: 2023-01-03 | End: 2023-01-03

## 2023-01-03 RX ORDER — DEXAMETHASONE SODIUM PHOSPHATE 10 MG/ML
INJECTION, SOLUTION INTRAMUSCULAR; INTRAVENOUS PRN
Status: DISCONTINUED | OUTPATIENT
Start: 2023-01-03 | End: 2023-01-03 | Stop reason: SDUPTHER

## 2023-01-03 RX ADMIN — ROCURONIUM BROMIDE 10 MG: 10 INJECTION, SOLUTION INTRAVENOUS at 09:05

## 2023-01-03 RX ADMIN — LIDOCAINE HYDROCHLORIDE 50 MG: 10 INJECTION, SOLUTION EPIDURAL; INFILTRATION; INTRACAUDAL; PERINEURAL at 08:16

## 2023-01-03 RX ADMIN — HYDROMORPHONE HYDROCHLORIDE 0.5 MG: 1 INJECTION, SOLUTION INTRAMUSCULAR; INTRAVENOUS; SUBCUTANEOUS at 13:21

## 2023-01-03 RX ADMIN — FENTANYL CITRATE 50 MCG: 50 INJECTION, SOLUTION INTRAMUSCULAR; INTRAVENOUS at 11:40

## 2023-01-03 RX ADMIN — FENTANYL CITRATE 50 MCG: 50 INJECTION, SOLUTION INTRAMUSCULAR; INTRAVENOUS at 09:00

## 2023-01-03 RX ADMIN — SODIUM CHLORIDE, POTASSIUM CHLORIDE, SODIUM LACTATE AND CALCIUM CHLORIDE: 600; 310; 30; 20 INJECTION, SOLUTION INTRAVENOUS at 08:10

## 2023-01-03 RX ADMIN — FENTANYL CITRATE 50 MCG: 50 INJECTION, SOLUTION INTRAMUSCULAR; INTRAVENOUS at 09:08

## 2023-01-03 RX ADMIN — MEPERIDINE HYDROCHLORIDE 12.5 MG: 50 INJECTION, SOLUTION INTRAMUSCULAR; INTRAVENOUS; SUBCUTANEOUS at 13:21

## 2023-01-03 RX ADMIN — ROCURONIUM BROMIDE 30 MG: 10 INJECTION, SOLUTION INTRAVENOUS at 08:19

## 2023-01-03 RX ADMIN — Medication 2 G: at 09:06

## 2023-01-03 RX ADMIN — DEXAMETHASONE SODIUM PHOSPHATE 10 MG: 10 INJECTION, SOLUTION INTRAMUSCULAR; INTRAVENOUS at 08:49

## 2023-01-03 RX ADMIN — PHENYLEPHRINE HYDROCHLORIDE 100 MCG: 10 INJECTION INTRAVENOUS at 08:54

## 2023-01-03 RX ADMIN — FENTANYL CITRATE 100 MCG: 50 INJECTION, SOLUTION INTRAMUSCULAR; INTRAVENOUS at 08:17

## 2023-01-03 RX ADMIN — HYDROMORPHONE HYDROCHLORIDE 0.5 MG: 1 INJECTION, SOLUTION INTRAMUSCULAR; INTRAVENOUS; SUBCUTANEOUS at 13:26

## 2023-01-03 RX ADMIN — MIDAZOLAM 2 MG: 1 INJECTION INTRAMUSCULAR; INTRAVENOUS at 08:11

## 2023-01-03 RX ADMIN — SODIUM CHLORIDE, POTASSIUM CHLORIDE, SODIUM LACTATE AND CALCIUM CHLORIDE 1000 ML: 600; 310; 30; 20 INJECTION, SOLUTION INTRAVENOUS at 07:51

## 2023-01-03 RX ADMIN — Medication 2 MG: at 11:37

## 2023-01-03 RX ADMIN — ONDANSETRON 4 MG: 2 INJECTION INTRAMUSCULAR; INTRAVENOUS at 11:13

## 2023-01-03 RX ADMIN — OXYCODONE HYDROCHLORIDE AND ACETAMINOPHEN 1 TABLET: 5; 325 TABLET ORAL at 13:41

## 2023-01-03 RX ADMIN — PROPOFOL 100 MCG/KG/MIN: 10 INJECTION, EMULSION INTRAVENOUS at 08:43

## 2023-01-03 RX ADMIN — Medication 5 MG: at 10:42

## 2023-01-03 RX ADMIN — PHENYLEPHRINE HYDROCHLORIDE 100 MCG: 10 INJECTION INTRAVENOUS at 08:26

## 2023-01-03 RX ADMIN — PROPOFOL 200 MG: 10 INJECTION, EMULSION INTRAVENOUS at 08:16

## 2023-01-03 RX ADMIN — GLYCOPYRROLATE 0.4 MG: 0.2 INJECTION INTRAMUSCULAR; INTRAVENOUS at 11:36

## 2023-01-03 RX ADMIN — FENTANYL CITRATE 50 MCG: 50 INJECTION, SOLUTION INTRAMUSCULAR; INTRAVENOUS at 09:40

## 2023-01-03 RX ADMIN — FENTANYL CITRATE 50 MCG: 50 INJECTION, SOLUTION INTRAMUSCULAR; INTRAVENOUS at 10:01

## 2023-01-03 ASSESSMENT — PAIN SCALES - GENERAL
PAINLEVEL_OUTOF10: 7
PAINLEVEL_OUTOF10: 5
PAINLEVEL_OUTOF10: 7
PAINLEVEL_OUTOF10: 5

## 2023-01-03 ASSESSMENT — PAIN DESCRIPTION - PAIN TYPE: TYPE: SURGICAL PAIN

## 2023-01-03 NOTE — OP NOTE
Operative Note      Patient: Diaz Villarreal  YOB: 1990  MRN: [id number]    Date of Procedure: 1/3/2023    Pre-Op Diagnosis: LUMBAR SPONDYLOSIS WITH RADICULOPATHY    Post-Op Diagnosis: Same    Indications for procedure:   Patient with chronic axial as well as radicular pain and failed multimodal conservative measures. Patient with successful spinal cord stimulator trial deemed appropriate for implantation and referred to neurosurgery. Procedure  T9 and T8 thoracic laminoplasty for implantation of paddle neurostimulator electrode  Implantation of neurostimulator generator  22 modifier  Use of fluoroscopy    Surgeon(s):  Edilberto Nguyen DO    Assistant:   Physician Assistant: Maggie Ng PA-C    Anesthesia: General    Estimated Blood Loss (mL): less than 50     Complications: None    Specimens:   * No specimens in log *    Implants:  Implant Name Type Inv.  Item Serial No.  Lot No. LRB No. Used Action   KIT SURG LD L70CM SURPS - [id number]  KIT SURG LD L70CM SURPS  NEVRO BREONNA-WD [id number] N/A 1 Implanted   KIT NEUROSTIMULATOR EPI ANCHR LD FOR N300 - [id number]  KIT NEUROSTIMULATOR EPI ANCHR LD FOR N300  NEVRO BREONNA-WD [id number] N/A 1 Implanted   PLATE BONE 68KF BAR 2 H CRANIOFACIAL FIX LO PROF W/ TAB UNIV - [id number]  PLATE BONE 73NI BAR 2 H CRANIOFACIAL FIX LO PROF W/ TAB UNIV  JOHNATHAN BREONNA-WD  N/A 3 Implanted   PLATE BNE BAR [id number] 2 H CRANIOMAXILLOFACIAL TI LO PROF FOR - [id number]  PLATE BNE BAR [id number] 2 H CRANIOMAXILLOFACIAL TI LO PROF FOR  JOHNATHAN CRANIOMAXILLOFACIAL-WD  N/A 1 Implanted   SCREW BNE L4MM DIA1.5MM CRSS PIN SELF DRL - [id number]  SCREW BNE L4MM DIA1.5MM CRSS PIN SELF DRL  JOHNATHAN CRANIOMAXILLOFACIAL-WD  N/A 8 Implanted   GENERATOR NEUROSTIMULATOR PULSE SENZA II - [id number]  GENERATOR NEUROSTIMULATOR PULSE SENZA II  NEVRO BREONNA-WD [id number] N/A 1 Implanted         Drains: * No LDAs found *    Findings: Significant difficulty encountered with threading the paddle neurostimulator electrode above T10 with persistent veering to the patient right. This ultimately required laminoplasty at T9 and 10 for appropriate implantation. Detailed Description of Procedure: The patient was consented preoperatively. He was brought into the operative room and placed under general exam.  She was flipped prone onto the Kt table all pressure points padded arms placed on the arm boards. Midline was marked as well as a right-sided flank incision per the patient's own discretion. AP fluoroscopy was used to identify the inferior of the T11 spinous process as the plan was to go under T10 Norte implant electrode at the top of T9 spanning the T9-10 disc space. After sterile prep and drape and timeout was performed I infiltrated the incision with onset lidocaine with epinephrine. 10 blade is used to perform incision followed by Bovie and Maria to perform a subperiosteal dissection followed by advancement of retractor. Inferior lamina of T11 was then confirmed. Complete T11 laminectomy was performed with rongeurs followed by high-speed bur followed by upgoing curette and 3 punch. Epidural fat was coagulated. The flavum and epidural fat at the inferior spinous process of T8-10 was identified. Alvie Hof was used to clear the epidural space. Multiple attempts at passing the paddle electrode were unsuccessful with the paddle persistently veering off to the patient's right side without central placement. Decision was made at this point to extend the incision cephalad up to the T8 inferior posterior element. This was done with a 10 blade followed by Bovie and Maria to perform a subperiosteal dissection advancement of retractor. I initially attempted to perform a laminotomy on the left side at T9 using the high-speed bur and upgoing curette and resection of the flavum using a 3 punch. I attempted to grab the electrode and pull up toward the patient left but was unsuccessful.   At this point the my sonics was opened and using the bone scalpel I performed bilateral bone cuts just medial to the facet line at T9 and T10. Complete release of the posterior elements was noted. The supraspinous interspinous ligaments were cut using the Bovie cautery at T8-9. Using upgoing curette and Kocher en bloc laminectomy was performed at T9-T10 and handed off to the scrub tech. Floseal Gelfoam used obtain hemostasis. The paddle electrode was then placed directly over the dura and tucked just under the T8 spinous process. AP x-ray showed that it was in the midline based on midline positioning between the pedicles on true AP. 5-0 Prolene was used to tack down the wire at the inferior margin to the dura while the top of the lead was tucked under the T8 lamina to ensure that it would be stable. The T9 and 10 lamina were then plated back using 4 dog bones and screws. AP x-ray showed good positioning of the electrode at this point. Both anchors were then deployed and tightened to the lead wires and secured to the fascia using 3-0 silk sutures. Sequentially AP x-rays confirmed good positioning of the electrode. Flank pocket was made using a 10 blade followed by Bovie to take it down in the subcutaneous layer 2 cm from the surface making a 3 x 3 cm pocket. Tunneling was then performed from distal to proximal.  The electrode contacts were then cleansed with a Ray-Maddie attached to the battery and impedances were within normal limits. 2 stay sutures using 3-0 silk were placed within the pocket all the wires were coiled behind the generator and the generator was placed within the pocket with words facing outward and upright. Both wounds were thoroughly irrigated with multiple rounds of Irrisept and hemostasis was obtained with bipolar cautery. The muscle in the midline was generously infiltrated with Exparel. Fascia was reapproximated using multiple 0 Vicryl's.   Inverted 2-0 Vicryl's were used to close the subcutaneous layer and a running 4-0 Monocryl for the skin. In similar fashion the right flank incision was closed with inverted 2-0 Vicryl's for the subcutaneous layer and a 4-0 Monocryl for the skin with Dermabond. Final AP and lateral x-ray showed the paddle electrode to be in good position at T9-10 and dorsal to the spinal cord on lateral x-ray. Patient was then flipped back supine extubated in stable condition returned to the PACU. Significant difficulty due to epidural scarring required significant additional work including a thoracic laminoplasty at T9-10 in order to place the paddle neurostimulator electrode. This added approximately 2 hours to the entirety of the case with significant complexity encountered.     Electronically signed by Octavia Machado DO on 1/3/2023 at 5:27 PM

## 2023-01-03 NOTE — ANESTHESIA POSTPROCEDURE EVALUATION
POST- ANESTHESIA EVALUATION       Pt Name: Jevon Samayoa  MRN: [id number]  YOB: 1990  Date of evaluation: 1/3/2023  Time:  1:23 PM      /86   Pulse (!) 103   Temp 97.3 °F (36.3 °C) (Temporal)   Resp 13   LMP 12/18/2022 (Exact Date) Comment: Urine HCG negative  SpO2 99%      Consciousness Level  Awake  Cardiopulmonary Status  Stable  Pain Adequately Treated YES  Nausea / Vomiting  NO  Adequate Hydration  YES  Anesthesia Related Complications NONE      Electronically signed by Chente Moreira MD on 1/3/2023 at 1:23 PM       Department of Anesthesiology  Postprocedure Note    Patient: Jevon Samayoa  MRN: [id number]  [place]: 1990  Date of evaluation: 1/3/2023      Procedure Summary     Date: 01/03/23 Room / Location: [address]    Anesthesia Start: 2246 Anesthesia Stop: 1210    Procedure: THORACIC T9-T10 LAMINOPLASTY FOR SPINAL CORD STIMULATOR IMPLANTATION Diagnosis:       Lumbosacral spondylosis with radiculopathy      (LUMBAR SPONDYLOSIS WITH RADICULOPATHY)    Surgeons: Gagandeep Wade DO Responsible Provider: Chente Moreira MD    Anesthesia Type: general, MAC ASA Status: 3          Anesthesia Type: No value filed.     Rufina Phase I: Rufina Score: 10    Rufina Phase II:        Anesthesia Post Evaluation

## 2023-01-03 NOTE — INTERVAL H&P NOTE
Pt Name: Dennis Nickerson  MRN: [id number]  [place]: 1990  Date of evaluation: 1/3/2023    I have reviewed the patient's history and physical examination completed in pre-admission testing on 12/20/2022. No changes to history or on examination today, unless noted below. None.     FUNMILAYO Stevens CNP  1/3/23  7:37 AM

## 2023-01-03 NOTE — PROGRESS NOTES
Neurosurgery Post op Progress Note      POD# 0    s/p thoracic laminoplasty for spinal cord stimulator placement     SUBJECTIVE:      Patient presents with back and leg pain persistently, failed conservative measures       OBJECTIVE      Physical exam   VITALS:    Vitals:    01/03/23 1245   BP: 126/86   Pulse: (!) 103   Resp: 13   Temp:    SpO2: 99%     INTAKE:    Intake/Output Summary (Last 24 hours) at 1/3/2023 1311  Last data filed at 1/3/2023 1138  Gross per 24 hour   Intake 1000 ml   Output 75 ml   Net 925 ml            Neurological exam   alert, oriented x3, affect appropriate, no focal neurological deficits, moves all extremities well, and no involuntary movements      Wound   Post op wound:  CDI      ASSESSMENT AND PLAN    29 y.o. female status post  thoracic laminoplasty for spinal cord stimulator placement post op day # 0    - Analgesia: percocet 1 tab every 4 hours   - Periop Antibiotics: keflex 500mg TID x 3 days   - Activity: As tolerated  - DVT prophylaxis: SCDs  - Diet:  Advance as tolerated  - ambulate  - plan for discharge today home- follow up in 2 weeks for post op wound check     Electronically signed by FUNMILAYO Zavaleta NP on 1/3/2023 at 1:11 PM

## 2023-01-03 NOTE — DISCHARGE INSTRUCTIONS
Thoracic Spine Surgery Discharge Instructions     Thank you for choosing [facility] and [facility] for your surgical needs. The following instructions will help to ensure your comfort and that you are well prepared after your surgery. Post-Operative Visit:   The office is located at:    [address], [address]    [facility] [place] [facility], [address] [facility], main floor    [address]    [phone]     Please also call your primary care physician to schedule an appointment for further evaluation and care. Diet:   You may resume your regular diet. Be sure to eat a well-balanced diet. Protein promotes wound healing. Pain medication and decreased activity can cause constipation. Drink 8-10 glasses of water a day, eat fresh fruits and vegetables, and add prunes, raisins and bran cereals to your diet if you do become constipated. A stool softener taken 1-2 times a day is helpful. Dulcolax suppositories or Fleets enemas are also available without a prescription. Call our office if the problem continues. Activity and Exercise:   No driving until you are seen in the office. Avoid riding in a car for the first two weeks until you come to the office for your scheduled follow-up. Start taking short, frequent walks in the beginning. [place], more frequent walks throughout the day are more beneficial than one long walk each day. You may gradually increase the distance; as tolerated. Your brace will help give support to your muscles while you walk. If your pain increases, you may be walking too much or too far. Try backing off for a day or two and then resume slowly. No lifting greater than 5 lbs (gallon of milk). No bending at the waist. Instead, bend at the knees and squat to pick something up. If physical therapy has been prescribed, you are not to perform range of motion, flexion, extension or lateral bending.    No baths, swimming or hot tub until you discuss this with your doctor. Incision Care and Hygiene: Your incision may be may be closed with sutures Steri-strips, staples, or glue. - The Steri-strips will fall off on their own in 7-10 days    - The staples or sutures should be removed about 2 weeks after surgery. If they are not removed please call the clinic to have them removed. - The glue will dissolve over time   No ointments or lotions on the incision   It is OK to shower 3-4 days after surgery. Let water run over the incision. Gently pat the incision dry with a clean towel, do not rub. Leave incision site open to air. Pain Management:   Do not take NSAID medications (Ibuprofen, Naprosyn, etc.) or De Guzman-2 inhibitors (Celebrex, etc.) for  12  weeks following surgery. You will be given a prescription for pain medication. Our hope is that you will eventually be weaned off all pain medications. Try not take the pain medicine unless you need to. If you feel that you do not need something that strong, you may use regular or extra-strength Tylenol instead. DO NOT drink alcohol, drive or operate heavy machinery while taking your pain medications. Notify the office if your pain is not controlled or you need a medication refill before your appointment. YOU SHOULD CALL THE OFFICE -[phone] IF YOU HAVE ANY OF THE FOLLOWING:   Increased pain or pain not relieved with current medications   If you notice any signs of infection such as bleeding, redness, swelling, tenderness, odor, drainage or opening of the incision. Please check your incisions twice daily. Fevers greater than 101.5 degrees   Flu-like symptoms, chills, shakes, chest pain, shortness of breath, nausea, vomiting, diarrhea   New or increased pain, numbness or tingling in the legs, as well as new or increased balance or coordination issues.    New difficulty with urinating or holding your bladder or your bowels     *If you are unable to contact someone at the office and your symptoms persist or increase, call 911 or go to the emergency department.

## 2023-01-03 NOTE — ANESTHESIA PRE PROCEDURE
Department of Anesthesiology  Preprocedure Note       Name:  Joel Mackey   Age:  29 y.o.  :  1990                                          MRN:  [id number]         Date:  1/3/2023      Surgeon: Jessie Mohs):  Keith Hatch DO    Procedure: Procedure(s):  THORACIC SPINAL CORD STIMULATOR IMPLANTATION (REGULAR TABLE, SUPINE, NEVRO SPINAL CORD STIMULATOR, EVOKES CONF# [id number])    Medications prior to admission:   Prior to Admission medications    Medication Sig Start Date End Date Taking? Authorizing Provider   Rimegepant Sulfate 75 MG TBDP Take 1 each by mouth daily  Patient not taking: Reported on 2022   Denisa So MD   pantoprazole (PROTONIX) 40 MG tablet Take 40 mg by mouth daily 22   Historical Provider, MD   tiZANidine (ZANAFLEX) 4 MG tablet TAKE 1 TABLET BY MOUTH TWICE DAILY AS NEEDED FOR SPAMS 22   Nabeel Guerrero MD   lidocaine (LIDODERM) 5 % USE 1 PATCH EXTERNALLY ONCE DAILY AS NEEDED 12 HOURS ON, 12 HOURS OFF 22   Nabeel Guerrero MD   CAPLYTA 42 MG CAPS Take 42 mg by mouth daily 22   Historical Provider, MD   SUMAtriptan (IMITREX) 50 MG tablet Take 1 tablet by mouth once as needed for Migraine 22  Denisa So MD   celecoxib (CELEBREX) 200 MG capsule Take 1 capsule by mouth daily 10/31/22 12/20/22  FUNMILAYO Nielsen - CNP   Semaglutide-Weight Management (WEGOVY) 0.25 MG/0.5ML SOAJ SC injection Inject 0.25 mg into the skin every 7 days  Patient not taking: Reported on 2022 10/27/22   Denisa So MD   Melatonin 10 MG TABS Take 30 mg by mouth    Historical Provider, MD   phentermine 37.5 MG capsule Take 37.5 mg by mouth every morning. Patient not taking: Reported on 2022    Historical Provider, MD   clobetasol (TEMOVATE) 0.05 % cream Apply topically 2 times daily. , as needed, for itching or scaling. 22   Aubree Leon PA-C   clobetasol (TEMOVATE) 0.05 % external solution Apply topically 2 times daily, as needed.  22 Angelique Herrera PA-C   buPROPion (WELLBUTRIN XL) 300 MG extended release tablet TAKE 1 TABLET BY MOUTH IN THE MORNING 6/13/22   Historical Provider, MD   venlafaxine (EFFEXOR) 75 MG tablet Take 75 mg by mouth daily 7/7/22   Historical Provider, MD   Adalimumab (HUMIRA PEN) 40 MG/0.4ML PNKT Inject 40 mg SC q2wk 7/13/22   Angelique Herrera PA-C   hydrOXYzine HCl (ATARAX) 50 MG tablet Take 50 mg by mouth in the morning and at bedtime 1/25/22   Historical Provider, MD       Current medications:    Current Facility-Administered Medications   Medication Dose Route Frequency Provider Last Rate Last Admin    lactated ringers infusion 1,000 mL  1,000 mL IntraVENous Continuous Kevin Freeman MD           Allergies:     Allergies   Allergen Reactions    Cat Hair Extract Other (See Comments)     Stuffy nose    Dust Mite Extract Other (See Comments)     Stuffy nose    Molds & Smuts Other (See Comments)     Stuffy nose    Seasonal Other (See Comments)     Stuffy nose       Problem List:    Patient Active Problem List   Diagnosis Code    Irregular bleeding N92.6    Back pain, chronic M54.9, G89.29    Chronic bilateral low back pain with bilateral sciatica M54.42, M54.41, G89.29    Encounter for Nexplanon removal Z30.46    Encounter for initial prescription of injectable contraceptive Z30.013    Lumbar disc herniation M51.26    Left lumbar radiculitis M54.16    Arthritis M19.90    Attention or concentration deficit R41.840    Bipolar 1 disorder, depressed ([facility]) F31.9    Knee pain M25.569    Lumbar facet joint syndrome M47.816    DDD (degenerative disc disease), lumbar M51.36    Spinal cord stimulator status Z96.89    Lumbosacral spondylosis without myelopathy M47.817       Past Medical History:        Diagnosis Date    ADHD     Anxiety     Arthritis     Back pain     B[facility] 1 disorder, depressed ([facility]     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    COVID-19 vaccine series not administered     DDD (degenerative disc disease), lumbar     Depression     Fibromyalgia     GERD (gastroesophageal reflux disease)     Hearing loss     Knee pain     Learning disability     Lumbar spondylosis     with radiculopathy    Migraine headache     MTHFR (methylene THF reductase) deficiency and homocystinuria ([address]     Obese     Prediabetes     Psoriasis     on Humira,  to use last dose on 12/22/22 per dermatologist for upcoming SCS surgery    RLS (restless legs syndrome)     Under care of service provider 12/20/2022    cqs-Fduvihou-deshyshClari silva-last visit oct 2022    Under care of service provider 12/20/2022    gi-estrella hemphill-last visit nov 2022    Under care of service provider 12/20/2022    pain-Aundrea Lewisia-last visit dec 2022    Under care of service provider 12/20/2022    behavioral health-lázaro millan-keshav- [facility]-last visit dec 2022    Under care of service provider 12/20/2022    dunkexpdo-jyrvvnz-ai vincent-last visit dec 2022       Past Surgical History:        Procedure Laterality Date    ADENOIDECTOMY      KNEE ARTHROSCOPY Left     OTHER SURGICAL HISTORY Bilateral 03/25/2021    lumbar DENIZ    OTHER SURGICAL HISTORY      bone spurs removed from both feet    PAIN MANAGEMENT PROCEDURE Bilateral 12/14/2020    EPIDURAL STEROID INJECTION BILATERAL L5 performed by Jeremy Juarez MD at [address] [place] [address] Bilateral 01/04/2021    EPIDURAL STEROID INJECTION BILATERAL L5 performed by Jeremy Juarez MD at [address] [place] [address] Bilateral 03/25/2021    LUMBAR FACET STEROID INJECTION BILATERAL L4 / 5 performed by Jeremy Juarez MD at [facility]  04/15/2021    MID BACK CYST LESION BIOPSY EXCISION    SKIN BIOPSY N/A 04/15/2021    MID BACK CYST LESION BIOPSY EXCISION performed by [address], DO at [address] [place] [address] EXTRACTION         Social History:    Social History     Tobacco Use    Smoking status: Former     Types: Cigarettes     Quit date: 2016     Years since quittin.0    Smokeless tobacco: Never   Substance Use Topics    Alcohol use: Never     Alcohol/week: 0.0 standard drinks                                Counseling given: Not Answered      Vital Signs (Current): There were no vitals filed for this visit. BP Readings from Last 3 Encounters:   22 121/82   22 130/89   22 131/86       NPO Status:                                                                                 BMI:   Wt Readings from Last 3 Encounters:   22 246 lb (111.6 kg)   22 246 lb (111.6 kg)   22 249 lb (112.9 kg)     There is no height or weight on file to calculate BMI.    CBC:   Lab Results   Component Value Date/Time    WBC 6.1 2022 10:26 AM    RBC 5.25 2022 10:26 AM    RBC 5.33 2019 09:53 PM    HGB 13.8 2022 10:26 AM    HCT 44.3 2022 10:26 AM    MCV 84.4 2022 10:26 AM    RDW 13.6 2022 10:26 AM     2022 10:26 AM       CMP:   Lab Results   Component Value Date/Time     2022 10:26 AM    K 3.6 2022 10:26 AM     2022 10:26 AM    CO2 30 2022 10:26 AM    BUN 10 2022 10:26 AM    CREATININE 0.81 2022 10:26 AM    GFRAA >60 2022 10:16 AM    LABGLOM >60 2022 10:26 AM    GLUCOSE 80 2022 10:26 AM    GLUCOSE 120 2019 09:53 PM    PROT 7.3 2022 10:16 AM    PROT 6.8 2016 11:07 AM    CALCIUM 9.7 2022 10:16 AM    BILITOT 0.30 2022 10:16 AM    ALKPHOS 74 2022 10:16 AM    AST 16 2022 10:16 AM    ALT 24 2022 10:16 AM       POC Tests: No results for input(s): POCGLU, POCNA, POCK, POCCL, POCBUN, POCHEMO, POCHCT in the last 72 hours.     Coags:   Lab Results   Component Value Date/Time    PROTIME 10.4 2022 10:26 AM    INR 1.0 2022 10:26 AM    APTT 26.0 2022 10:26 AM       HCG (If Applicable):   Lab Results   Component Value Date    PREGTESTUR Negative 09/10/2019    HCG NEGATIVE 11/30/2022        ABGs: No results found for: PHART, PO2ART, XKC0SFA, OMI5MTK, BEART, O8YAERMU     Type & Screen (If Applicable):  No results found for: LABABO, LABRH    Drug/Infectious Status (If Applicable):  Lab Results   Component Value Date/Time    HEPCAB NONREACTIVE 06/23/2022 10:16 AM       COVID-19 Screening (If Applicable):   Lab Results   Component Value Date/Time    COVID19 Not Detected 04/11/2021 02:09 PM           Anesthesia Evaluation  Patient summary reviewed and Nursing notes reviewed no history of anesthetic complications:   Airway: Mallampati: II  TM distance: >3 FB   Neck ROM: full  Mouth opening: > = 3 FB   Dental:    (+) caps      Pulmonary:normal exam                               Cardiovascular:                      Neuro/Psych:   (+) neuromuscular disease (bilateral sciatica):, headaches: migraine headaches, depression/anxiety  (bipolar)            GI/Hepatic/Renal:   (+) GERD:, morbid obesity          Endo/Other:                     Abdominal:             Vascular: Other Findings:           Anesthesia Plan      general and MAC     ASA 3       Induction: intravenous. MIPS: Postoperative opioids intended and Prophylactic antiemetics administered. Anesthetic plan and risks discussed with patient. Plan discussed with CRNA.                     Annie Yee MD   1/3/2023

## 2023-01-04 ENCOUNTER — TELEPHONE (OUTPATIENT)
Dept: NEUROSURGERY | Age: 33
End: 2023-01-04

## 2023-01-04 NOTE — TELEPHONE ENCOUNTER
Patient called in to report that after the anesthesia she notice that the left side of her belly was numb. Numbness still present. Patient is questioning if that is normal.Surgery 1.3.23. No other numbness/ issues reported.

## 2023-01-09 ENCOUNTER — OFFICE VISIT (OUTPATIENT)
Dept: NEUROSURGERY | Age: 33
End: 2023-01-09

## 2023-01-09 VITALS
HEIGHT: 63 IN | SYSTOLIC BLOOD PRESSURE: 117 MMHG | WEIGHT: 246 LBS | OXYGEN SATURATION: 98 % | HEART RATE: 88 BPM | BODY MASS INDEX: 43.59 KG/M2 | DIASTOLIC BLOOD PRESSURE: 78 MMHG | TEMPERATURE: 97.5 F

## 2023-01-09 DIAGNOSIS — M47.26 OTHER SPONDYLOSIS WITH RADICULOPATHY, LUMBAR REGION: Primary | ICD-10-CM

## 2023-01-09 PROCEDURE — 99024 POSTOP FOLLOW-UP VISIT: CPT | Performed by: NEUROLOGICAL SURGERY

## 2023-01-09 RX ORDER — OXYCODONE HYDROCHLORIDE AND ACETAMINOPHEN 5; 325 MG/1; MG/1
1 TABLET ORAL EVERY 4 HOURS PRN
Qty: 35 TABLET | Refills: 0 | Status: SHIPPED | OUTPATIENT
Start: 2023-01-09 | End: 2023-01-16

## 2023-01-09 RX ORDER — DOCUSATE SODIUM 100 MG/1
100 CAPSULE, LIQUID FILLED ORAL 2 TIMES DAILY
Qty: 60 CAPSULE | Refills: 0 | Status: SHIPPED | OUTPATIENT
Start: 2023-01-09 | End: 2023-02-08

## 2023-01-09 NOTE — PROGRESS NOTES
Seen in office. Off antibiotics. Weaning narcotics and on muscle relaxant only. Pain she states is diffuse and not isolated to incisions  No fevers chills. Mobilizing some and more independent. Wound cdi. No erythema, drainage, fluctuance. Some tenderness    Refilled painmeds.  No si/sx of infection   F/u 4wks

## 2023-01-17 ENCOUNTER — OFFICE VISIT (OUTPATIENT)
Dept: NEUROSURGERY | Age: 33
End: 2023-01-17

## 2023-01-17 VITALS
HEART RATE: 86 BPM | WEIGHT: 246 LBS | OXYGEN SATURATION: 94 % | DIASTOLIC BLOOD PRESSURE: 79 MMHG | BODY MASS INDEX: 43.59 KG/M2 | TEMPERATURE: 97.8 F | SYSTOLIC BLOOD PRESSURE: 127 MMHG | HEIGHT: 63 IN

## 2023-01-17 DIAGNOSIS — M47.26 OTHER SPONDYLOSIS WITH RADICULOPATHY, LUMBAR REGION: Primary | ICD-10-CM

## 2023-01-17 DIAGNOSIS — Z96.89 S/P INSERTION OF SPINAL CORD STIMULATOR: ICD-10-CM

## 2023-01-17 PROCEDURE — 99024 POSTOP FOLLOW-UP VISIT: CPT | Performed by: NURSE PRACTITIONER

## 2023-01-17 RX ORDER — HYDROXYZINE PAMOATE 100 MG/1
CAPSULE ORAL
COMMUNITY
Start: 2023-01-09

## 2023-01-17 NOTE — PROGRESS NOTES
[address]  [facility] [address], [address] [facility] [address]  Dept: [phone]    Patient:  Jaxon Slaughter  YOB: 1990  Date: 1/17/23    The patient is a 29 y.o. female who presents today for consult of the following problems:     Chief Complaint   Patient presents with    Post-Op Check     2 week post-op THORACIC T9-T10 LAMINOPLASTY FOR SPINAL CORD STIMULATOR IMPLANTATION         HPI:     Jaxon Slaughter is a 29 y.o. female who presents to the office for post-op evaluation s/p spinal cord stimulator placement. Patient states she is doing very well. Reports improvement to mobility. Incisions healing well. No discharge, drainage, fevers, chills. Stimulator rep present, adjusting programming. Patient starts physical therapy tomorrow. Postop pain well controlled. Strength 5/5  Sensation intact  Incisions CDI    Date of surgery: 1/3/2023    Assessment and Plan:      1. Other spondylosis with radiculopathy, lumbar region    2. S/P insertion of spinal cord stimulator          Plan: Patient doing very well postoperatively. Start physical therapy tomorrow as planned. Next postop visit in 6 weeks with Dr. Alex Simon as scheduled. Contact office with questions or concerns in the interim. Followup: Return in about 6 weeks (around 2/28/2023), or if symptoms worsen or fail to improve. Prescriptions Ordered:  No orders of the defined types were placed in this encounter. Orders Placed:  No orders of the defined types were placed in this encounter. Electronically signed by FUNMILAYO Martinez CNP on 1/17/2023 at 10:39 AM    Please note that this chart was generated using voice recognition Dragon dictation software. Although every effort was made to ensure the accuracy of this automated transcription, some errors in transcription may have occurred.

## 2023-01-18 ENCOUNTER — TELEPHONE (OUTPATIENT)
Dept: NEUROSURGERY | Age: 33
End: 2023-01-18

## 2023-01-18 ENCOUNTER — HOSPITAL ENCOUNTER (OUTPATIENT)
Dept: PHYSICAL THERAPY | Facility: CLINIC | Age: 33
Setting detail: THERAPIES SERIES
Discharge: HOME | End: 2023-01-18
Payer: MEDICARE

## 2023-01-18 PROCEDURE — 97161 PT EVAL LOW COMPLEX 20 MIN: CPT

## 2023-01-18 PROCEDURE — 97110 THERAPEUTIC EXERCISES: CPT

## 2023-01-18 NOTE — TELEPHONE ENCOUNTER
Pt's PT called to say she believes Pt would benefit from water therapy. PT would like to know if/ when Pt would be able to start this.

## 2023-01-18 NOTE — CONSULTS
[] [facility]) - [facility] &  Therapy  [address]  [phone]  F: [phone] [] [address]  [address]   [address]  P: [phone]  F: [phone] [] [address] &  Therapy  [address] [facility]  P: [phone]  F: [phone] [] [address]  [facility]   [address]   [place]: [phone]  F: [phone]  [x] [address]  P: [phone]  F: [phone]      Physical Therapy Spine Evaluation    Date:  2023  Patient: Krystle Guillermo  :  1990  MRN: [id number]  Physician: Dr. Levine Wilton: 27 visits  Medical Diagnosis: Spondylosis with radiculopathy   Rehab Codes: Back pain (M54. 5)  Onset date: DOS: 1/3/23  Next Dr's appt. : 6 week follow up    Subjective:   CC: Pt had surgery (spinal cord stimulator- T9/T10) on . 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 Pt had multiple injections in the past with only temporary relief. Has not received any PT for lower back. Pt just finished taking muscle relaxor, currently taking Tizanidine.          PMHx: [] Unremarkable [] Diabetes [] HTN  [] Pacemaker   [] MI/Heart Problems [] Cancer [] Arthritis [] Other:              [x] Refer to full medical chart  In EPIC         Tests: [] X-Ray: [] MRI:  [] Other:    Medications: [x] Refer to full medical record [] None [] Other:  Allergies:      [x] Refer to full medical record [] None [] Other:        Marital Status Lives with bf, his dad and stepmom   Home type Two story    Stairs from outside            Hand rail    Stairs inside [employer] rail    Employment Disability    Job status    Work Activities/duties     Recreational Activities Pain present? Yes   Location Mid to upper back    Pain Rating currently 5.5/10   Pain at worse 9.5/10   Pain at best 2.5-3/10   Description of pain Constant ache    Altered Sensation None currently   What makes it worse    What makes it better    Symptom progression    Sleep Difficult to get comfortable              Objective:   STRENGTH  ROM    Left Right Cervical    L1-2 Hip Flex 4/5 4/5 Flexion    Hip Abd   Extension    L3-4 Knee Ext 4+/5 4+/5 Rotation L R   L4 Ankle DF   Sidebend L R   L5 EHL   Retraction    S1 Plant. Flex   Lumbar    Abdominals   Flexion Limited 50% pain   Erector Spinae   Extension Limited 75% pain      Rotation L Limited 25%- L sided trunk pain R Limited 25%      Sidebend L WFL R WFL      UE/LE       TESTS (+/-) LEFT RIGHT Not Tested   SLR [] sit [] supine   [x]   Hamstring (SLR)   [x]   SKTC Stretch felt Stretch felt  []   Hollis Tests ? Pain ? Pain No Change Not Tested   RFIS [x] [] [] []   TERRIE [x] [] [] []   RFIL [x] [] [] []       OBSERVATION No Deficit Deficit Not Tested Comments   Posture       Forward Head [] [] []    Rounded Shoulders [] [] []    Kyphosis [] [] []    Lordosis [] [] []    Leg Length Discrp [] [] []    Slumped Sitting [] [] []    Palpation [] [x] [] TTP bilateral upper lumbar and lower thoracic paraspinals   Sensation [x] [] []    Edema [x] [] []    Neurological [x] [] []                Functional Test: Oswestry  Score: 50% functionally impaired         Assessment:   Patient presents with signs and symptoms consistent with post op back soreness and limited ROM secondary to surgical insertion of spinal cord stimulator. Patient would benefit from skilled physical therapy services in order to: Increase lumbar and thoracic ROM, as well as strengthen core to increase overeall lumbar stabilization. Pt with poor tolerance to movement at this time, and will therefore benefit from aquatic therapy to encourage movement in a gravity minimized environment. Goals:        STG: (to be met in 10 treatments)  ? Pain: Decrease pain levels to 5/10 at worst in low and mid back  ? ROM: Increase flexibility and AROM limitations throughout to equal bilat to reduce difficulty with ADLs, increase to full painfree lumbar AROM  ? Strength: Increase bilat hip flex strength from 4/5 to 4+/5  ? Function: Pt to report sleeping through the night secondary to a decrease of pain  Independent with Home Exercise Programs    LTG: (to be met in 20 treatments)  Improve score on assessment tool from 50% impaired to 25% impaired, demonstrating an improvement in ADLs  Reduce pain levels to 0/10                   Patient goals: Decrease back pain     Rehab Potential:  [x] Good  [] Fair  [] Poor   Suggested Professional Referral:  [x] No  [] Yes:  Barriers to Goal Achievement[de-identified]  [x] No  [] Yes:  Domestic Concerns:  [x] No  [] Yes:    Pt. Education:  [x] Plans/Goals, Risks/Benefits discussed  [x] Home exercise program    Method of Education: [x] Verbal  [x] Demo  [x] Written  Comprehension of Education:  [x] Verbalizes understanding. [x] Demonstrates understanding. [x] Needs Review. [] Demonstrates/verbalizes understanding of HEP/Ed previously given. Access Code: [email or web address]  URL: [email or web address]  Date: 52/12/0718  Prepared by: Mercedes Piedra    Exercises  Supine Lower Trunk Rotation - 1 x daily - 7 x weekly - 2 sets - 10 reps  Hooklying Single Knee to Chest Stretch with Towel - 1 x daily - 7 x weekly - 1 sets - 3 reps - 20\" hold  Supine March with Posterior Pelvic Tilt - 1 x daily - 7 x weekly - 2 sets - 10 reps      Treatment Plan:  [x] Therapeutic Exercise (82397 )             [] Aquatic Therapy (69904)  [x] Manual Therapy (91811)              [] Electrical Stimulation- Unattended (79828)  [] Integrative Dry Needling                                      [] Electrical Stimulation- Attended (69268)  [x] Instruction in HEP                             [] Lumbar/Cervical Traction (68696)  [] Neuromuscular Re-education (19820)            [x] Cold/hotpack    [x] Vasocompression (97931)                                   [] Ultrasound (80798)                      [x] Gait Training (19157)                                                          [] Therapeutic Activity (32351)               [] Iontophoresis (66440): 4 mg/mL Dexamethasone Sodium Phosphate 40-80 mAmin            []  Medication allergies reviewed for use of    Dexamethasone Sodium Phosphate 4mg/ml     with iontophoresis treatments. Pt is not allergic. Frequency:  2 x/week for 20 visits    Todays Treatment:    Exercises:  Exercise     Reps/ Time Weight/ Level Comments   NUSTEP            LTR 2x10     SKTC with towel 3x20\"     PPT 10x5\"                                                                 Other:    Specific Instructions for next treatment: Await return call from MD regarding possible aquatic therapy, otherwise continue with gentle lumbar and thoracic stretching     Evaluation Complexity:  History (Personal factors, comorbidities) [x] 0 [] 1-2 [] 3+   Exam (limitations, restrictions) [x] 1-2 [] 3 [] 4+   Clinical presentation (progression) [x] Stable [] Evolving  [] Unstable   Decision Making [x] Low [] Moderate [] High    [x] Low Complexity [] Moderate Complexity [] High Complexity       Treatment Charges: Mins Units   [x] Evaluation       [x]  Low       []  Moderate       []  High 35 1   []  Modalities     [x]  Ther Exercise 15 1   []  Manual Therapy     []  Ther Activities     []  Aquatics     []  Vasocompression     []  Other       TOTAL TREATMENT TIME: 50 minutes    Time in: 1050AM   Time Out: 1140    Electronically signed by: Sissy Pierce PT        Physician Signature:________________________________Date:__________________  By signing above or cosigning this note, I have reviewed this plan of care and certify a need for medically necessary rehabilitation services.      *PLEASE SIGN ABOVE AND FAX BACK ALL PAGES*

## 2023-01-23 ENCOUNTER — HOSPITAL ENCOUNTER (OUTPATIENT)
Dept: PHYSICAL THERAPY | Facility: CLINIC | Age: 33
Setting detail: THERAPIES SERIES
Discharge: HOME | End: 2023-01-23
Payer: MEDICARE

## 2023-01-23 PROCEDURE — 97110 THERAPEUTIC EXERCISES: CPT

## 2023-01-23 NOTE — FLOWSHEET NOTE
[] [address]  [address]  [phone]  F: [phone] [] [address]  [facility]   [address]  P: [phone]  F: [phone] [] [address]  [address] [facility]  P: [phone]  F: [phone] [x] [address]  P: [phone]  F: [phone] [] [address]  [facility]   [address]   [place]: [phone]  F: [phone]      Physical Therapy Daily Treatment Note    Date:  2023  Patient Name:  Jono Mead    :  1990  MRN: [id number]  Physician: Dr. Sun Grimm: 27 visits  Medical Diagnosis: Spondylosis with radiculopathy                        Rehab Codes: Back pain (M54. 5)  Onset date: DOS: 1/3/23                   Next Dr's appt. : 6 week follow up     Visit# / total visits:     Cancels/No Shows: 0/0    Subjective:    Pain:  [x] Yes  [] No  Location: LBP  Pain Rating: (0-10 scale) 8/10  Pain altered Tx:  [] No  [x] Yes  Action: MHP to begin therapy  Comments: Pt arrives with continued back soreness. Did receive approval for aquatics which pt plans to start this week at [facility] [place] location. Objective:   Exercises:  Exercise       Reps/ Time Weight/ Level Comments   NUSTEP                   LTR 2x10       SKTC with towel 3x20\"       PPT 10x5\"        PPT alt UE  2x10       Other:     Specific Instructions for next treatment: Continue as aquatic therapy    Access Code: [email or web address]  URL: [email or web address]  Date:   Prepared by: Tim Concepcion     Exercises  Supine Lower Trunk Rotation - 1 x daily - 7 x weekly - 2 sets - 10 reps  Hooklying Single Knee to Chest Stretch with Towel - 1 x daily - 7 x weekly - 1 sets - 3 reps - 20\" hold  Supine March with Posterior Pelvic Tilt - 1 x daily - 7 x weekly - 2 sets - 10 reps       Treatment Charges: Mins Units   [x]  Modalities: MHP 10 0   [x]  Ther Exercise 30 2   []  Manual Therapy     []  Ther Activities     []  Aquatics     []  Vasocompression     []  Other     Total Treatment time 30 2       Assessment: [] Progressing toward goals. [] No change. [x] Other: Pt arrived with continued soreness, reviewed HEP which pt demonstrated increased ability to perform with decreased pain. Attempted to progress PPT with marches however was too painful, pt was able to tolerate alternating UE. Ended with NUSTEP for a cool down with good tolerance. Pt agreeable to continue as aquatic therapy    [] Patient would continue to benefit from skilled physical therapy services in order to: Increase lumbar and thoracic ROM, as well as strengthen core to increase overeall lumbar stabilization. Pt with poor tolerance to movement at this time, and will therefore benefit from aquatic therapy to encourage movement in a gravity minimized environment    STG/LTG:  STG: (to be met in 10 treatments)  ? Pain: Decrease pain levels to 5/10 at worst in low and mid back  ? ROM: Increase flexibility and AROM limitations throughout to equal bilat to reduce difficulty with ADLs, increase to full painfree lumbar AROM  ? Strength: Increase bilat hip flex strength from 4/5 to 4+/5  ? Function: Pt to report sleeping through the night secondary to a decrease of pain  Independent with Home Exercise Programs     LTG: (to be met in 20 treatments)  Improve score on assessment tool from 50% impaired to 25% impaired, demonstrating an improvement in ADLs  Reduce pain levels to 0/10               Patient goals: Decrease back pain     Pt. Education:  [x] Yes  [] No  [x] Reviewed Prior HEP/Ed  Method of Education: [] Verbal  [] Demo  [] Written  Comprehension of Education:  [] Verbalizes understanding. [] Demonstrates understanding.   [x] Needs review. [] Demonstrates/verbalizes HEP/Ed previously given. Plan: [] Continue current frequency toward long and short term goals.     [x] Specific Instructions for subsequent treatments: Continue as aquatic therapy      Time In: 6778            Time Out: 0396    Electronically signed by:  Josefina Zazueta PTA

## 2023-01-25 ENCOUNTER — HOSPITAL ENCOUNTER (OUTPATIENT)
Dept: PHYSICAL THERAPY | Facility: CLINIC | Age: 33
Setting detail: THERAPIES SERIES
Discharge: HOME | End: 2023-01-25
Payer: MEDICARE

## 2023-01-25 PROCEDURE — 97113 AQUATIC THERAPY/EXERCISES: CPT

## 2023-01-25 NOTE — FLOWSHEET NOTE
[] [facility]  [facility] [place]: [phone]  F: [phone]     Physical Therapy Daily  Aquatic Treatment Note    Date:  2023  Patient Name:  Melissa Valenzuela    :  1990  MRN: [id number]  Physician: Melissa Valenzuela                     :  1990                     MRN: [id number]  Physician: Dr. Douglas Swift: 30 visits  Medical Diagnosis: Spondylosis with radiculopathy                        Rehab Codes: Back pain (M54. 5)  Onset date: DOS: 1/3/23                   Next Dr's appt. : 6 week follow up     Visit# / total visits: 3/20                      Cancels/No Shows: 0/0      Subjective:    Pain:  [x] Yes  [] No Location: LB Pain Rating: (0-10 scale) 710  Pain altered Tx:  [] No  [x] Yes  Action:aquatics  Comments:Pt reports pain LB today and feels from standing outside for 10 min taking a video of her boyfriends re enactment.     Objective:     KEY  B = Belt G = Gloves N = Noodle   C = Cuffs K = Kickboard P = Paddles   CC = Cervical Collar L = Laps T = Theratube   DB = Dumbells M = Minutes W = Weights     Exercises/Activities  Warm-up/Amb  Dynamic Exercises    Forward 3L March    Sideways 3L Squat    Backwards 3L Retro HS curls      Retro SLR    Stretches  Braiding    Gastroc/Soleus  Heel to Toe amb    Hamstring  Toe amb    Hip flexor  Heel amb    Piriformis      SKTC      Pec Stretch      Post Deltoid  Static Exercises UE TA contr     Shoulder flex/ext 10   Static Exercises LE  Shoulder abd/add 10   Heel/toe raises 10 Shoulder H.  abd/add 10    10 Shoulder IR/ER    Mini-squats 10 Rowing    4-way hip  10 Arm Circles    Hamstring curls 10 UT shrugs/rolls    Hip Circles/Fig 8  Scap squeezes    Ankle ROM  Diagonals 1/2    Lunges   Elbow flex/ext      Pron/Sup    Functional Exercise  Wrist AROM    Step      Wall Push-ups  Deep H20/    SLS  Bike 3m   Breast Stroke on Noodle  Hip abd/add   Noodle Twist  Hip flex/ext    Noodle Push down  Hip IR/ER    Kickboard push/pull  Knee flex/ext      Push/pull on Rail      Hang 5m   Other:    Specific Instructions for next treatment:    Treatment Charges: Mins Units   []  Modalities     []  Ther Exercise     []  Manual Therapy     []  Ther Activities     [x]  Aquatics 35 2   []  Other       Assessment: [x] Progressing toward goals.  Initiated light aquatic ex per flow sheet with good matti and VC for TA contr and upright posture throughout tx.  Pt notes less pain in the water and after deep water hanging.  Pt feels less pain after tx.  Will cont to monitor sx and progress as pt matti.    [] No change.     [] Other:  STG: (to be met in 10 treatments)  ? Pain: Decrease pain levels to 5/10 at worst in low and mid back  ? ROM: Increase flexibility and AROM limitations throughout to equal bilat to reduce difficulty with ADLs, increase to full painfree lumbar AROM  ? Strength: Increase bilat hip flex strength from 4/5 to 4+/5  ? Function: Pt to report sleeping through the night secondary to a decrease of pain  Independent with Home Exercise Programs     LTG: (to be met in 20 treatments)  Improve score on assessment tool from 50% impaired to 25% impaired, demonstrating an improvement in ADLs  Reduce pain levels to 0/10               Patient goals: Decrease back pain     Pt. Education:  [x] Yes  [] No  [] Reviewed Prior HEP/Ed  Method of Education: [x] Verbal  [] Demo  [] Written  Comprehension of Education:  [x] Verbalizes understanding.  [] Demonstrates understanding.  [] Needs review.  [] Demonstrates/verbalizes HEP/Ed previously given.     Plan: [x] Continue per plan of care.   [] Other:      Time In:2:10            Time Out: 3:00pm    Electronically signed by:  KEMAR MORGAN PTA

## 2023-01-26 ENCOUNTER — APPOINTMENT (OUTPATIENT)
Dept: PHYSICAL THERAPY | Facility: CLINIC | Age: 33
End: 2023-01-26
Payer: MEDICARE

## 2023-01-30 ENCOUNTER — TELEPHONE (OUTPATIENT)
Dept: NEUROSURGERY | Age: 33
End: 2023-01-30

## 2023-01-30 NOTE — TELEPHONE ENCOUNTER
Patient would like to know when she can back her chiropractor. She had surgery on  1/3/2023, THORACIC T9-T10 LAMINOPLASTY FOR SPINAL CORD STIMULATOR IMPLANTATION.

## 2023-01-31 RX ORDER — ADALIMUMAB 40MG/0.4ML
KIT SUBCUTANEOUS
Qty: 2 EACH | Refills: 2 | Status: SHIPPED | OUTPATIENT
Start: 2023-01-31

## 2023-02-01 ENCOUNTER — HOSPITAL ENCOUNTER (OUTPATIENT)
Dept: PHYSICAL THERAPY | Facility: CLINIC | Age: 33
Setting detail: THERAPIES SERIES
Discharge: HOME | End: 2023-02-01

## 2023-02-01 NOTE — FLOWSHEET NOTE
[] [facility]) [facility] &  Therapy  [address]    [phone]  F: [phone]   [] [address]  [facility]   [address]  P: [phone]  F: [phone]  [] [address]  [address]  P: [phone]  F: [phone] [] [address]  P: [phone]  F: [phone]  [] [address]  [address] [facility] [address]   [address]   [place]: [phone]  F: [phone]   [] [place] [address] [facility] [address]  [place]: [phone]   F: [phone]     Physical Therapy Cancel/No Show note    Date: 2023  Patient: Suhas Javed  : 1990  MRN: [id number]    Cancels/No Shows to date:     For today's appointment patient:    [x]  Cancelled    [] Rescheduled appointment    [] No-show     Reason given by patient:    []  Patient ill    []  Conflicting appointment    [] No transportation      [] Conflict with work    [] No reason given    [] Weather related    [] COVID-19    [x] Other:      Comments:  Pt stated she was having too much back pain to come to therapy. Will call back to reschedule.       [] Next appointment was confirmed    Electronically signed by: Vahid De Santiago

## 2023-02-02 ENCOUNTER — TELEPHONE (OUTPATIENT)
Dept: DERMATOLOGY | Age: 33
End: 2023-02-02

## 2023-02-02 NOTE — TELEPHONE ENCOUNTER
Specialty pharmacy called for refill of Humira Pen 40mg. Pt was last seen 09/2022  And was instructed to continue Humira.  I gave the pharmacy a verbal script for this medication

## 2023-02-20 ENCOUNTER — OFFICE VISIT (OUTPATIENT)
Dept: PAIN MANAGEMENT | Age: 33
End: 2023-02-20
Payer: MEDICAID

## 2023-02-20 VITALS — HEART RATE: 96 BPM | WEIGHT: 246 LBS | BODY MASS INDEX: 43.59 KG/M2 | HEIGHT: 63 IN | OXYGEN SATURATION: 100 %

## 2023-02-20 DIAGNOSIS — M51.36 DDD (DEGENERATIVE DISC DISEASE), LUMBAR: Primary | ICD-10-CM

## 2023-02-20 DIAGNOSIS — M47.26 OTHER SPONDYLOSIS WITH RADICULOPATHY, LUMBAR REGION: ICD-10-CM

## 2023-02-20 PROCEDURE — 99213 OFFICE O/P EST LOW 20 MIN: CPT | Performed by: NURSE PRACTITIONER

## 2023-02-20 RX ORDER — CELECOXIB 200 MG/1
200 CAPSULE ORAL DAILY
Qty: 30 CAPSULE | Refills: 2 | Status: SHIPPED | OUTPATIENT
Start: 2023-02-20 | End: 2023-03-22

## 2023-02-20 RX ORDER — TIZANIDINE 4 MG/1
TABLET ORAL
Qty: 60 TABLET | Refills: 2 | Status: SHIPPED | OUTPATIENT
Start: 2023-02-20

## 2023-02-20 ASSESSMENT — ENCOUNTER SYMPTOMS
RESPIRATORY NEGATIVE: 1
GASTROINTESTINAL NEGATIVE: 1
BACK PAIN: 1

## 2023-02-20 NOTE — PROGRESS NOTES
Chief Complaint   Patient presents with    Back Pain     Wants to discuss meds     PMH     Pt c/o severe back pain located in the lower lumbar for over 5 years. No known injury or surgery to the area. Reports occ radiation down to hips without numbness/tingling  Pt had  LESI in 2020 which helped her radicular pain   Pt also c/o  thoracic pain over scapular area. Suggested exercises stretches and massage therapy to help with that area   Was evaluated by the spine surgeon at [facility] and is not advised for any surgery  Lumbar MRI 1/2022 multilevel degenerative changes mainly seen at L4-5 and L5-S1 level mild canal stenosis mainly at L5-S1 level   Thoracic MRI 9/22 multilevel degenerative changes without canal stenosis or foraminal  narrowing  Pt had bilat lumbar RFA  of L4, L5 AND SACRAL ALA for L4/5 AND L5/S1 facet joints done 11/30/22 and reported 75-80% relief of pain and improved activity tolerance  Pt had Nevro SCS implant 1/3/23 with Dr Oscar Kinney and reports signficant relief of her back and leg pain.  Incision well healed  Pt completed 3 visits of PT in Jan and did not return d/t worsening pain      HPI  Patient is here today for: back pain discuss meds  Pain level: 1/10  Character: aching  Radiating: no   Weakness or numbness: no  Aggravating Factors: sitting, standing  Alleviating Factors: nothing  Constant or intermitting: constant  Bladder/bowel loss: no               Past Medical History:   Diagnosis Date    ADHD     Anxiety     Arthritis     Back pain     Bipolar 1 disorder, depressed ([address]     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    COVID-19 vaccine series not administered     DDD (degenerative disc disease), lumbar     Depression     Fibromyalgia     GERD (gastroesophageal reflux disease)     Hearing loss     Knee pain     Learning disability     Lumbar spondylosis     with radiculopathy    Migraine headache     MTHFR (methylene THF reductase) deficiency and homocystinuria ([address] Obese     Prediabetes     Psoriasis     on Humira,  to use last dose on 12/22/22 per dermatologist for upcoming SCS surgery    RLS (restless legs syndrome)     Under care of service provider 12/20/2022    kaw-Npcllfuc-wetpavgClari silva-last visit oct 2022    Under care of service provider 12/20/2022    gi-estrella hemphill-last visit nov 2022    Under care of service provider 12/20/2022    pain-Aundrea Millard-fantasmaia-last visit dec 2022    Under care of service provider 12/20/2022    behavioral health-lázaro millan-lucyosky- [facility]-last visit dec 2022    Under care of service provider 12/20/2022    srznxtpzm-igcyelc-lc vincent-last visit dec 2022       Past Surgical History:   Procedure Laterality Date    ADENOIDECTOMY      KNEE ARTHROSCOPY Left     OTHER SURGICAL HISTORY Bilateral 03/25/2021    lumbar DENIZ    OTHER SURGICAL HISTORY      bone spurs removed from both feet    PAIN MANAGEMENT PROCEDURE Bilateral 12/14/2020    EPIDURAL STEROID INJECTION BILATERAL L5 performed by Pratik Haynes MD at [address] [place] [address] Bilateral 01/04/2021    EPIDURAL STEROID INJECTION BILATERAL L5 performed by Pratik Haynes MD at [address] [place] [address] Bilateral 03/25/2021    LUMBAR FACET STEROID INJECTION BILATERAL L4 / 5 performed by Pratik Haynes MD at [facility]  04/15/2021    MID BACK CYST LESION BIOPSY EXCISION    SKIN BIOPSY N/A 04/15/2021    MID BACK CYST LESION BIOPSY EXCISION performed by Michelet Salcedo DO at [place] [address]  01/03/2023    thoracic    STIMULATOR SURGERY N/A 1/3/2023    THORACIC T9-T10 LAMINOPLASTY FOR SPINAL CORD STIMULATOR IMPLANTATION performed by Cy Larry DO at [address] EXTRACTION         Allergies   Allergen Reactions    Cat Hair Extract Other (See Comments)     Stuffy nose    Dust Mite Extract Other (See Comments)     Stuffy nose    Molds & Smuts Other (See Comments)     Stuffy nose    Seasonal Other (See Comments)     Stuffy nose         Current Outpatient Medications:     Adalimumab (HUMIRA PEN) 40 MG/0.4ML PNKT, Inject 40 mg SC q2wk, Disp: 2 each, Rfl: 2    hydrOXYzine pamoate (VISTARIL) 100 MG capsule, TAKE 1 CAPSULE BY MOUTH IN THE MORNING AND BEFORE BEDTIME, Disp: , Rfl:     Rimegepant Sulfate 75 MG TBDP, Take 1 each by mouth daily, Disp: 30 tablet, Rfl: 0    pantoprazole (PROTONIX) 40 MG tablet, Take 40 mg by mouth daily, Disp: , Rfl:     lidocaine (LIDODERM) 5 %, USE 1 PATCH EXTERNALLY ONCE DAILY AS NEEDED 12 HOURS ON, 12 HOURS OFF, Disp: 30 patch, Rfl: 1    CAPLYTA 42 MG CAPS, Take 42 mg by mouth daily, Disp: , Rfl:     SUMAtriptan (IMITREX) 50 MG tablet, Take 1 tablet by mouth once as needed for Migraine, Disp: 9 tablet, Rfl: 5    Semaglutide-Weight Management (WEGOVY) 0.25 MG/0.5ML SOAJ SC injection, Inject 0.25 mg into the skin every 7 days, Disp: 2 mL, Rfl: 0    Melatonin 10 MG TABS, Take 30 mg by mouth, Disp: , Rfl:     phentermine 37.5 MG capsule, Take 37.5 mg by mouth every morning., Disp: , Rfl:     clobetasol (TEMOVATE) 0.05 % cream, Apply topically 2 times daily. , as needed, for itching or scaling., Disp: 60 g, Rfl: 3    clobetasol (TEMOVATE) 0.05 % external solution, Apply topically 2 times daily, as needed. , Disp: 50 mL, Rfl: 2    buPROPion (WELLBUTRIN XL) 300 MG extended release tablet, TAKE 1 TABLET BY MOUTH IN THE MORNING, Disp: , Rfl:     venlafaxine (EFFEXOR) 75 MG tablet, Take 75 mg by mouth daily, Disp: , Rfl:     hydrOXYzine HCl (ATARAX) 50 MG tablet, Take 50 mg by mouth in the morning and at bedtime, Disp: , Rfl:     Family History   Problem Relation Age of Onset    No Known Problems Mother     Heart Disease Father     Diabetes Father     Other Sister         sepsis in blood stream and pneumonia    Alcohol Abuse Brother     Drug Abuse Brother     Alcohol Abuse Brother     Drug Abuse Brother     Diabetes Maternal Grandmother     Diabetes Maternal Grandfather     Diabetes Paternal Grandmother     Diabetes Paternal Grandfather     Breast Cancer Neg Hx     Colon Cancer Neg Hx     Ovarian Cancer Neg Hx        Social History     Socioeconomic History    Marital status: Single     Spouse name: Not on file    Number of children: Not on file    Years of education: Not on file    Highest education level: Not on file   Occupational History    Not on file   Tobacco Use    Smoking status: Former     Types: Cigarettes     Quit date: 2016     Years since quittin.1    Smokeless tobacco: Never   Vaping Use    Vaping Use: Former   Substance and Sexual Activity    Alcohol use: Never     Alcohol/week: 0.0 standard drinks    Drug use: No    Sexual activity: Yes     Partners: Male   Other Topics Concern    Not on file   Social History Narrative    Not on file     Social Determinants of Health     Financial Resource Strain: Not on file   Food Insecurity: Not on file   Transportation Needs: Not on file   Physical Activity: Not on file   Stress: Not on file   Social Connections: Not on file   Intimate Partner Violence: Not on file   Housing Stability: Not on file       Review of Systems:  Review of Systems   Constitutional: Negative. HENT: Negative. Respiratory: Negative. Musculoskeletal:  Positive for back pain. Gastrointestinal: Negative. Genitourinary: Negative. Physical Exam:  Pulse 96   Ht 5' 3\" (1.6 m)   Wt 246 lb (111.6 kg)   SpO2 100%   BMI 43.58 kg/m²     Physical Exam  Cardiovascular:      Rate and Rhythm: Normal rate. Pulmonary:      Effort: Pulmonary effort is normal.   Musculoskeletal:         General: Normal range of motion. Skin:     General: Skin is warm and dry. Neurological:      Mental Status: She is alert and oriented to person, place, and time.            Assessment:  Problem List Items Addressed This Visit       DDD (degenerative disc disease), lumbar - Primary     Other Visit Diagnoses       Other spondylosis with radiculopathy, lumbar region                   Treatment Plan:  Patient relates current medications are helping the pain. Patient reports taking pain medications as prescribed, denies obtaining medications from different sources and denies use of illegal drugs. Medication risk and benefits have been discussed. Patient denies side effects from medications like nausea, vomiting, constipation or drowsiness. Patient reports current activities of daily living are possible due to medications and would like to continue them. As always, we encourage daily stretching and strengthening exercises, and recommend minimizing use of pain medications unless patient cannot get through daily activities due to pain. Continue current medication management, pt has been stable and compliant. Script written for Celebrex and tizanidine  Pt plans to f/u with NS post op  Pt plans to restart chiropractic care once cleared  Consider repeat for RFA if axial pain worsens   Follow up appointment made for 3 months    I have reviewed the chief complaint and history of present illness (including ROS and PFSH) and vital documentation by my staff and I agree with their documentation and have added where applicable.

## 2023-02-27 ENCOUNTER — HOSPITAL ENCOUNTER (OUTPATIENT)
Age: 33
Setting detail: SPECIMEN
Discharge: HOME | End: 2023-02-27

## 2023-02-27 ENCOUNTER — OFFICE VISIT (OUTPATIENT)
Dept: FAMILY MEDICINE CLINIC | Age: 33
End: 2023-02-27
Payer: MEDICAID

## 2023-02-27 VITALS
TEMPERATURE: 97.7 F | WEIGHT: 255 LBS | SYSTOLIC BLOOD PRESSURE: 125 MMHG | DIASTOLIC BLOOD PRESSURE: 88 MMHG | HEIGHT: 63 IN | BODY MASS INDEX: 45.18 KG/M2 | HEART RATE: 88 BPM | OXYGEN SATURATION: 98 %

## 2023-02-27 DIAGNOSIS — E66.01 MORBID OBESITY WITH BMI OF 45.0-49.9, ADULT (HCC): Primary | ICD-10-CM

## 2023-02-27 DIAGNOSIS — E55.9 VITAMIN D DEFICIENCY: ICD-10-CM

## 2023-02-27 LAB — 25(OH)D3 SERPL-MCNC: 18.1 NG/ML

## 2023-02-27 PROCEDURE — 99213 OFFICE O/P EST LOW 20 MIN: CPT | Performed by: FAMILY MEDICINE

## 2023-02-27 RX ORDER — PHENTERMINE HYDROCHLORIDE 37.5 MG/1
37.5 TABLET ORAL
Qty: 30 TABLET | Refills: 0 | Status: SHIPPED | OUTPATIENT
Start: 2023-02-27 | End: 2023-03-29

## 2023-02-27 SDOH — ECONOMIC STABILITY: HOUSING INSECURITY
IN THE LAST 12 MONTHS, WAS THERE A TIME WHEN YOU DID NOT HAVE A STEADY PLACE TO SLEEP OR SLEPT IN A SHELTER (INCLUDING NOW)?: NO

## 2023-02-27 SDOH — ECONOMIC STABILITY: INCOME INSECURITY: HOW HARD IS IT FOR YOU TO PAY FOR THE VERY BASICS LIKE FOOD, HOUSING, MEDICAL CARE, AND HEATING?: NOT HARD AT ALL

## 2023-02-27 SDOH — ECONOMIC STABILITY: FOOD INSECURITY: WITHIN THE PAST 12 MONTHS, YOU WORRIED THAT YOUR FOOD WOULD RUN OUT BEFORE YOU GOT MONEY TO BUY MORE.: NEVER TRUE

## 2023-02-27 SDOH — ECONOMIC STABILITY: FOOD INSECURITY: WITHIN THE PAST 12 MONTHS, THE FOOD YOU BOUGHT JUST DIDN'T LAST AND YOU DIDN'T HAVE MONEY TO GET MORE.: NEVER TRUE

## 2023-02-27 ASSESSMENT — PATIENT HEALTH QUESTIONNAIRE - PHQ9
SUM OF ALL RESPONSES TO PHQ QUESTIONS 1-9: 0
4. FEELING TIRED OR HAVING LITTLE ENERGY: 0
8. MOVING OR SPEAKING SO SLOWLY THAT OTHER PEOPLE COULD HAVE NOTICED. OR THE OPPOSITE, BEING SO FIGETY OR RESTLESS THAT YOU HAVE BEEN MOVING AROUND A LOT MORE THAN USUAL: 0
SUM OF ALL RESPONSES TO PHQ9 QUESTIONS 1 & 2: 0
SUM OF ALL RESPONSES TO PHQ QUESTIONS 1-9: 0
7. TROUBLE CONCENTRATING ON THINGS, SUCH AS READING THE NEWSPAPER OR WATCHING TELEVISION: 0
SUM OF ALL RESPONSES TO PHQ QUESTIONS 1-9: 0
9. THOUGHTS THAT YOU WOULD BE BETTER OFF DEAD, OR OF HURTING YOURSELF: 0
1. LITTLE INTEREST OR PLEASURE IN DOING THINGS: 0
10. IF YOU CHECKED OFF ANY PROBLEMS, HOW DIFFICULT HAVE THESE PROBLEMS MADE IT FOR YOU TO DO YOUR WORK, TAKE CARE OF THINGS AT HOME, OR GET ALONG WITH OTHER PEOPLE: 0
5. POOR APPETITE OR OVEREATING: 0
6. FEELING BAD ABOUT YOURSELF - OR THAT YOU ARE A FAILURE OR HAVE LET YOURSELF OR YOUR FAMILY DOWN: 0
2. FEELING DOWN, DEPRESSED OR HOPELESS: 0
SUM OF ALL RESPONSES TO PHQ QUESTIONS 1-9: 0
3. TROUBLE FALLING OR STAYING ASLEEP: 0

## 2023-02-27 NOTE — PROGRESS NOTES
General FM note    Maira Villalba is a 32 y.o. female who presents today for follow up on her  medical conditions as noted below.  Maira Villalba is c/o of   Chief Complaint   Patient presents with    Weight Management     4 month       Patient Active Problem List:     Irregular bleeding     Back pain, chronic     Chronic bilateral low back pain with bilateral sciatica     Encounter for Nexplanon removal     Encounter for initial prescription of injectable contraceptive     Lumbar disc herniation     Left lumbar radiculitis     Arthritis     Attention or concentration deficit     Bipolar 1 disorder, depressed (HCC)     Knee pain     Lumbar facet joint syndrome     DDD (degenerative disc disease), lumbar     Spinal cord stimulator status     Lumbosacral spondylosis without myelopathy     Past Medical History:   Diagnosis Date    ADHD     Anxiety     Arthritis     Back pain     Bipolar 1 disorder, depressed (HCC)     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    COVID-19 vaccine series not administered     DDD (degenerative disc disease), lumbar     Depression     Fibromyalgia     GERD (gastroesophageal reflux disease)     Hearing loss     Knee pain     Learning disability     Lumbar spondylosis     with radiculopathy    Migraine headache     MTHFR (methylene THF reductase) deficiency and homocystinuria (HCC)     Obese     Prediabetes     Psoriasis     on Humira,  to use last dose on 12/22/22 per dermatologist for upcoming SCS surgery    RLS (restless legs syndrome)     Under care of service provider 12/20/2022    mjv-Vecldvsh-jbkdmyoClari silva-last visit oct 2022    Under care of service provider 12/20/2022    gi-estrella hemphill-last visit nov 2022    Under care of service provider 12/20/2022    pain-Aundrea Jorge-last visit dec 2022    Under care of service provider 12/20/2022    behavioral health-lázaro turner- sofia-last visit dec 2022    Under care of service provider 12/20/2022  gyczqkcjo-shiobis-zw vincent-last visit dec 2022      Past Surgical History:   Procedure Laterality Date    ADENOIDECTOMY      KNEE ARTHROSCOPY Left     OTHER SURGICAL HISTORY Bilateral 03/25/2021    lumbar DENIZ    OTHER SURGICAL HISTORY      bone spurs removed from both feet    PAIN MANAGEMENT PROCEDURE Bilateral 12/14/2020    EPIDURAL STEROID INJECTION BILATERAL L5 performed by Conchita Bob MD at [facility] Bilateral 01/04/2021    EPIDURAL STEROID INJECTION BILATERAL L5 performed by Conchita Bob MD at [facility] Bilateral 03/25/2021    LUMBAR FACET STEROID INJECTION BILATERAL L4 / 5 performed by Conchita Bob MD at [facility]  04/15/2021    MID BACK CYST LESION BIOPSY EXCISION    SKIN BIOPSY N/A 04/15/2021    MID BACK CYST LESION BIOPSY EXCISION performed by Panda Estes DO at [place] [address]  01/03/2023    thoracic    STIMULATOR SURGERY N/A 1/3/2023    THORACIC T9-T10 LAMINOPLASTY FOR SPINAL CORD STIMULATOR IMPLANTATION performed by Camilo Garnett DO at [address] EXTRACTION       Family History   Problem Relation Age of Onset    No Known Problems Mother     Heart Disease Father     Diabetes Father     Other Sister         sepsis in blood stream and pneumonia    Alcohol Abuse Brother     Drug Abuse Brother     Alcohol Abuse Brother     Drug Abuse Brother     Diabetes Maternal Grandmother     Diabetes Maternal Grandfather     Diabetes Paternal Grandmother     Diabetes Paternal Grandfather     Breast Cancer Neg Hx     Colon Cancer Neg Hx     Ovarian Cancer Neg Hx      Current Outpatient Medications   Medication Sig Dispense Refill    phentermine (ADIPEX-P) 37.5 MG tablet Take 1 tablet by mouth every morning (before breakfast) for 30 days.  Max Daily Amount: 37.5 mg 30 tablet 0    tiZANidine (ZANAFLEX) 4 MG tablet TAKE 1 TABLET BY MOUTH TWICE DAILY AS NEEDED FOR SPAMS 60 tablet 2    Adalimumab (HUMIRA PEN) 40 MG/0.4ML PNKT Inject 40 mg SC q2wk 2 each 2    Rimegepant Sulfate 75 MG TBDP Take 1 each by mouth daily 30 tablet 0    pantoprazole (PROTONIX) 40 MG tablet Take 40 mg by mouth daily      lidocaine (LIDODERM) 5 % USE 1 PATCH EXTERNALLY ONCE DAILY AS NEEDED 12 HOURS ON, 12 HOURS OFF 30 patch 1    CAPLYTA 42 MG CAPS Take 42 mg by mouth daily      Melatonin 10 MG TABS Take 30 mg by mouth      clobetasol (TEMOVATE) 0.05 % cream Apply topically 2 times daily. , as needed, for itching or scaling. 60 g 3    clobetasol (TEMOVATE) 0.05 % external solution Apply topically 2 times daily, as needed. 50 mL 2    buPROPion (WELLBUTRIN XL) 300 MG extended release tablet TAKE 1 TABLET BY MOUTH IN THE MORNING      venlafaxine (EFFEXOR) 75 MG tablet Take 75 mg by mouth daily      hydrOXYzine HCl (ATARAX) 50 MG tablet Take 50 mg by mouth in the morning and at bedtime      celecoxib (CELEBREX) 200 MG capsule Take 1 capsule by mouth daily (Patient not taking: Reported on 2/27/2023) 30 capsule 2    hydrOXYzine pamoate (VISTARIL) 100 MG capsule TAKE 1 CAPSULE BY MOUTH IN THE MORNING AND BEFORE BEDTIME (Patient not taking: Reported on 2/27/2023)      SUMAtriptan (IMITREX) 50 MG tablet Take 1 tablet by mouth once as needed for Migraine 9 tablet 5    Semaglutide-Weight Management (WEGOVY) 0.25 MG/0.5ML SOAJ SC injection Inject 0.25 mg into the skin every 7 days (Patient not taking: Reported on 2/27/2023) 2 mL 0    phentermine 37.5 MG capsule Take 37.5 mg by mouth every morning. (Patient not taking: Reported on 2/27/2023)       No current facility-administered medications for this visit.      ALLERGIES:    Allergies   Allergen Reactions    Cat Hair Extract Other (See Comments)     Stuffy nose    Dust Mite Extract Other (See Comments)     Stuffy nose    Molds & Smuts Other (See Comments)     Stuffy nose    Seasonal Other (See Comments)     Stuffy nose       Social History     Tobacco Use    Smoking status: Former     Types: Cigarettes     Quit date: 2016     Years since quittin.1    Smokeless tobacco: Never   Substance Use Topics    Alcohol use: Never     Alcohol/week: 0.0 standard drinks      Body mass index is 45.17 kg/m². /88   Pulse 88   Temp 97.7 °F (36.5 °C)   Ht 5' 3\" (1.6 m)   Wt 255 lb (115.7 kg)   SpO2 98%   BMI 45.17 kg/m²     Subjective:      HPI    29 y.o. female coming today for weight management. The patient gained an additional 9 pounds from her last visit. A lot of noodles -   Not a lot of exercises. Sleeps a lot with the muscle relaxant Zanaflex. She states that she got up this morning at 8 she went to physician and then she came home and went to bed again. She really does not exercise a lot. Review of Systems   Constitutional: Negative for fever and unexpected weight change. Pertinent items are noted in HPI. Objective:   Physical Exam  Constitutional: VS (see above). General appearance: normal development, habitus and attention, no deformities. No distress. Eyes: normal conjunctiva and lids. CAV: RRR, no RMG. No edema lower extremities. Pulmo: CTA bilateral, no CWR. Skin: no rashes, lesions or ulcers. Musculoskeletal: normal gait. Nails: no clubbing or cyanosis. Psychiatric: alert and oriented to place, time and person. Normal mood and affect. Assessment:       Diagnosis Orders   1. Morbid obesity with BMI of 45.0-49.9, adult ([facility])  phentermine (ADIPEX-P) 37.5 MG tablet      2. Vitamin D deficiency  Vitamin D 25 Hydroxy          Plan:   First prescription of Adipex provided. I discussed with her again in detail in length that she needs to change her diet she needs to change her regimen specially for exercise. Vitamin D level ordered. Return in about 4 weeks (around 3/27/2023), or if symptoms worsen or fail to improve, for weight.   Orders Placed This Encounter   Procedures    Vitamin D 25 Hydroxy     Standing Status:   Future     Standing Expiration Date:   2/27/2024       Orders Placed This Encounter   Medications    phentermine (ADIPEX-P) 37.5 MG tablet     Sig: Take 1 tablet by mouth every morning (before breakfast) for 30 days. Max Daily Amount: 37.5 mg     Dispense:  30 tablet     Refill:  0         Call or return to clinic prn if these symptoms worsen or fail to improve as anticipated.  I have reviewed the instructions with the patient, answering all questions to patient's satisfaction.      Maira received counseling on the following healthy behaviors: nutrition, exercise, and medication adherence  Reviewed prior labs and health maintenance.  Continue current medications, diet and exercise.  Discussed use, benefit, and side effects of prescribed medications. Barriers to medication compliance addressed.   Patient given educational materials - see patient instructions.    All patient questions answered.  Patient voiced understanding.      Electronically signed by Cintia Cleary MD on 2/27/2023 at 2:06 PM       (Please note that portions of this note were completed with a voice recognition program. Efforts were made to edit the dictations but occasionally words are mis-transcribed.)

## 2023-02-28 ENCOUNTER — OFFICE VISIT (OUTPATIENT)
Dept: OBGYN CLINIC | Age: 33
End: 2023-02-28
Payer: MEDICAID

## 2023-02-28 ENCOUNTER — HOSPITAL ENCOUNTER (OUTPATIENT)
Age: 33
Setting detail: SPECIMEN
Discharge: HOME | End: 2023-02-28

## 2023-02-28 VITALS
DIASTOLIC BLOOD PRESSURE: 82 MMHG | HEIGHT: 63 IN | WEIGHT: 252 LBS | BODY MASS INDEX: 44.65 KG/M2 | SYSTOLIC BLOOD PRESSURE: 122 MMHG

## 2023-02-28 DIAGNOSIS — E55.9 VITAMIN D DEFICIENCY: Primary | ICD-10-CM

## 2023-02-28 DIAGNOSIS — Z11.51 SCREENING FOR HUMAN PAPILLOMAVIRUS (HPV): ICD-10-CM

## 2023-02-28 DIAGNOSIS — Z01.419 WELL WOMAN EXAM WITH ROUTINE GYNECOLOGICAL EXAM: Primary | ICD-10-CM

## 2023-02-28 PROCEDURE — 99395 PREV VISIT EST AGE 18-39: CPT | Performed by: CLINICAL NURSE SPECIALIST

## 2023-02-28 RX ORDER — ERGOCALCIFEROL 1.25 MG/1
50000 CAPSULE ORAL WEEKLY
Qty: 4 CAPSULE | Refills: 3 | Status: SHIPPED | OUTPATIENT
Start: 2023-02-28

## 2023-02-28 NOTE — PROGRESS NOTES
535 [facility] B AND GYNECOLOGY  [address] [place] [address] [address]   [address] [address] [place] [phone]  Dept: [phone]        DATE OF VISIT:  23        History and Physical    Maira Davis    :  1990  CHIEF COMPLAINT:    Chief Complaint   Patient presents with    Annual Exam                    Karyle Grams is a 29 y.o. female who presents for annual well woman exam.     The patient was seen and examined. Per the patient bowels are regular. She has no voiding complaints. She denies any bloating as well as vaginal discharge. Chaperone for Intimate Exam  Chaperone was offered as part of the rooming process. Patient declined and agrees to continue with exam without a chaperone.   Chaperone: none     _____________________________________________________________________  Past Medical History:   Diagnosis Date    ADHD     Anxiety     Arthritis     Back pain     Bipolar 1 disorder, depressed ([facility] [address]     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    COVID-19 vaccine series not administered     DDD (degenerative disc disease), lumbar     Depression     Fibromyalgia     GERD (gastroesophageal reflux disease)     Hearing loss     Knee pain     Learning disability     Lumbar spondylosis     with radiculopathy    Migraine headache     MTHFR (methylene THF reductase) deficiency and homocystinuria (HCC)     Obese     Prediabetes     Psoriasis     on Humira,  to use last dose on 22 per dermatologist for upcoming SCS surgery    RLS (restless legs syndrome)     Under care of service provider 2022    gcs-Dbfnjnap-wmiigwaClari silva-last visit oct 2022    Under care of service provider 2022    gi-estrella hemphill-last visit 2022    Under care of service provider 2022    pain-Aundrea Jorge-last visit dec 2022    Under care of service provider 2022    behavioral health-lázaro turner- [facility]-last visit dec 2022    Under care of service provider 2022    lvjiukwxe-yyqjcda-ko vincent-last visit dec 2022                                                                   Past Surgical History:   Procedure Laterality Date    ADENOIDECTOMY      KNEE ARTHROSCOPY Left     OTHER SURGICAL HISTORY Bilateral 2021    lumbar DENIZ    OTHER SURGICAL HISTORY      bone spurs removed from both feet    PAIN MANAGEMENT PROCEDURE Bilateral 2020    EPIDURAL STEROID INJECTION BILATERAL L5 performed by Emilee Sosa MD at [address] Bilateral 2021    EPIDURAL STEROID INJECTION BILATERAL L5 performed by Emilee Sosa MD at [address] Bilateral 2021    LUMBAR FACET STEROID INJECTION BILATERAL L4 / 5 performed by Emilee Sosa MD at [facility]  04/15/2021    MID BACK CYST LESION BIOPSY EXCISION    SKIN BIOPSY N/A 04/15/2021    MID BACK CYST LESION BIOPSY EXCISION performed by Luciano Osorio DO at [place] [address]  2023    thoracic    STIMULATOR SURGERY N/A 1/3/2023    THORACIC T9-T10 LAMINOPLASTY FOR SPINAL CORD STIMULATOR IMPLANTATION performed by Francesca Del Rosario DO at [address] EXTRACTION       Family History   Problem Relation Age of Onset    No Known Problems Mother     Heart Disease Father     Diabetes Father     Other Sister         sepsis in blood stream and pneumonia    Alcohol Abuse Brother     Drug Abuse Brother     Alcohol Abuse Brother     Drug Abuse Brother     Diabetes Maternal Grandmother     Diabetes Maternal Grandfather     Diabetes Paternal Grandmother     Diabetes Paternal Grandfather     Breast Cancer Neg Hx     Colon Cancer Neg Hx     Ovarian Cancer Neg Hx      Social History     Tobacco Use   Smoking Status Former    Types: Cigarettes    Quit date: 2016    Years since quittin.1   Smokeless Tobacco Never     Social History     Substance and Sexual Activity   Alcohol Use Never    Alcohol/week: 0.0 standard drinks     Current Outpatient Medications   Medication Sig Dispense Refill    ergocalciferol (ERGOCALCIFEROL) 1.25 MG (58164 UT) capsule Take 1 capsule by mouth once a week 4 capsule 3    phentermine (ADIPEX-P) 37.5 MG tablet Take 1 tablet by mouth every morning (before breakfast) for 30 days. Max Daily Amount: 37.5 mg 30 tablet 0    tiZANidine (ZANAFLEX) 4 MG tablet TAKE 1 TABLET BY MOUTH TWICE DAILY AS NEEDED FOR SPAMS 60 tablet 2    celecoxib (CELEBREX) 200 MG capsule Take 1 capsule by mouth daily 30 capsule 2    Adalimumab (HUMIRA PEN) 40 MG/0.4ML PNKT Inject 40 mg SC q2wk 2 each 2    hydrOXYzine pamoate (VISTARIL) 100 MG capsule       pantoprazole (PROTONIX) 40 MG tablet Take 40 mg by mouth daily      lidocaine (LIDODERM) 5 % USE 1 PATCH EXTERNALLY ONCE DAILY AS NEEDED 12 HOURS ON, 12 HOURS OFF 30 patch 1    CAPLYTA 42 MG CAPS Take 42 mg by mouth daily      Melatonin 10 MG TABS Take 30 mg by mouth      clobetasol (TEMOVATE) 0.05 % cream Apply topically 2 times daily., as needed, for itching or scaling. 60 g 3    clobetasol (TEMOVATE) 0.05 % external solution Apply topically 2 times daily, as needed. 50 mL 2    buPROPion (WELLBUTRIN XL) 300 MG extended release tablet TAKE 1 TABLET BY MOUTH IN THE MORNING      venlafaxine (EFFEXOR) 75 MG tablet Take 75 mg by mouth daily      Rimegepant Sulfate 75 MG TBDP Take 1 each by mouth daily 30 tablet 0    SUMAtriptan (IMITREX) 50 MG tablet Take 1 tablet by mouth once as needed for Migraine 9 tablet 5    Semaglutide-Weight Management (WEGOVY) 0.25 MG/0.5ML SOAJ SC injection Inject 0.25 mg into the skin every 7 days (Patient not taking: No sig reported) 2 mL 0    phentermine 37.5 MG capsule Take 37.5 mg by mouth every morning. (Patient not taking: No sig reported)      hydrOXYzine HCl (ATARAX) 50 MG tablet Take 50 mg by mouth in the morning and at bedtime (Patient not taking: Reported on 2/28/2023)    No current facility-administered medications for this visit. Allergies: Allergies   Allergen Reactions    Cat Hair Extract Other (See Comments)     Stuffy nose    Dust Mite Extract Other (See Comments)     Stuffy nose    Molds & Smuts Other (See Comments)     Stuffy nose    Seasonal Other (See Comments)     Stuffy nose       Gynecologic History:  Patient's last menstrual period was 2023. Sexually Active: Yes  STD History:No  Birth Control: No    OB History    Para Term  AB Living   0 0 0 0 0 0   SAB IAB Ectopic Molar Multiple Live Births   0 0 0 0 0 0     ______________________________________________________________________    Review of Systems    REVIEW OFSYSTEMS:        Constitutional:  Unexpected weight change, extreme fatigue, night sweats              no  Skin:                           Rashes, moles   no  Neurological:  Frequentheadaches couple times per month, seizures         yes  Ophthalmic:  Recent visual changes no  ENT:   Difficulty swallowing  no  Breast:              Masses, pain ( soreness), nipple discharge                            yes     Respiratory:  Shortness of breath, coughing           yes    Cardiovascular: Chest pain   no     Gastrointestinal: Chronic diarrhea/constipation, nausea/vomiting           no   Urogenital:  Urinary incontinence, frequency, urgency          no                                         Heavy/irregular periods           no                                      Vaginal discharge                   no  Hematological: Bruises easy   no     Endocrine:  Hot flashes   no     Hot/Cold Intolerance  no    Psychological:            Mood and affect were within normal limits. yes                 Physical Exam    Physical Exam:    Vitals:    23 0957   BP: 122/82   Site: Left Upper Arm   Position: Sitting   Cuff Size: Medium Adult   Weight: 252 lb (114.3 kg)   Height: 5' 3\" (1.6 m)       General Appearance:   This  is a well developed, well nourished, well groomed female. Her BMI was reviewed. Nutritional decision making andexercise were discussed. Neurological:  The patient is alert and oriented to time,place, person, and situation    Skin:  A brief inspection of the skin revealed no rashes or lesions. Neck:  The neck was supple. Respiratory: There was unlabored respiratory effort. Lungs clear to ascultation. Cardiovascular: The patients extremities were without calf tenderness or edema. Heart with a regular rate and rhythm. Abdomen: The abdomen was soft and non-tender with no guarding, rebound or rigidity. No hernias were appreciated. Breast:   The patients breasts were symmetrical.  There were no masses, discharge or retractions noted. Self breast exams were reviewed. Pelvic Exam:  The external genitalia was with a normal appearance. The vaginal vault was normal. There were no cystocele, rectocele, or enterocele appreciated. There was no vaginal discharge. The cervix was without lesions. There was no cervical motion tenderness. The uterus was mobile, midline and regular. The adnexa no fullness, tenderness or masses appreciated. ASSESSMENT: Normal annual well woman exam    29 y.o. Female; Annual   Diagnosis Orders   1. Well woman exam with routine gynecological exam  PAP Smear      2. Screening for human papillomavirus (HPV)                       PLAN:  - Discussed new papsmear guidelines. - Birth control Discussed. - Smoking risk factors Discussed  - Diet and exercise reviewed. - Routine healthmaintenance per patients PCP.  - Return to clinic in 1 year or earlier with questions, problems, concerns. Return for 1 year for Annual and as needed.         Electronically signed by FUNMILAYO Reardon CNP on 2/28/2023 at 10:16 AM

## 2023-02-28 NOTE — PROGRESS NOTES
535 [facility] B AND GYNECOLOGY  [address] [place] [address] [address] [facility].  Pauly Vyas [address] [place] [address]  Dept: [phone]        DATE OF VISIT:  23        History and Physical    Maira Morales    :  1990  CHIEF COMPLAINT:    Chief Complaint   Patient presents with    Annual Exam                    Laura Apple is a 29 y.o. female    The patient was seen and examined. Per the patient bowels are regular. She has no voiding complaints. She denies any bloating as well as vaginal discharge. Chaperone for Intimate Exam  Chaperone was {offered & accepted, requested or declined:[id number]::\"offered and accepted as part of the rooming process. \"}  Chaperone: ***     _____________________________________________________________________  Past Medical History:   Diagnosis Date    ADHD     Anxiety     Arthritis     Back pain     Bipolar 1 disorder, depressed ([facility] [address]     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    COVID-19 vaccine series not administered     DDD (degenerative disc disease), lumbar     Depression     Fibromyalgia     GERD (gastroesophageal reflux disease)     Hearing loss     Knee pain     Learning disability     Lumbar spondylosis     with radiculopathy    Migraine headache     MTHFR (methylene THF reductase) deficiency and homocystinuria (HCC)     Obese     Prediabetes     Psoriasis     on Humira,  to use last dose on 22 per dermatologist for upcoming SCS surgery    RLS (restless legs syndrome)     Under care of service provider 2022    oui-Iddncsrr-crkgqjtClari silva-last visit oct 2022    Under care of service provider 2022    gi-estrella hemphill-last visit 2022    Under care of service provider 2022    pain-Aundrea Jorge-last visit dec 2022    Under care of service provider 2022    behavioral health-lázaro turner- [facility]-last visit dec 2022    Under care of service provider 2022    jslvhapzu-hymhfoh-rs vincent-last visit dec 2022                                                                   Past Surgical History:   Procedure Laterality Date    ADENOIDECTOMY      KNEE ARTHROSCOPY Left     OTHER SURGICAL HISTORY Bilateral 2021    lumbar DENIZ    OTHER SURGICAL HISTORY      bone spurs removed from both feet    PAIN MANAGEMENT PROCEDURE Bilateral 2020    EPIDURAL STEROID INJECTION BILATERAL L5 performed by Matty Ribeiro MD at [facility] Bilateral 2021    EPIDURAL STEROID INJECTION BILATERAL L5 performed by Matty Ribeiro MD at [facility] Bilateral 2021    LUMBAR FACET STEROID INJECTION BILATERAL L4 / 5 performed by Matty Ribeiro MD at [facility]  04/15/2021    MID BACK CYST LESION BIOPSY EXCISION    SKIN BIOPSY N/A 04/15/2021    MID BACK CYST LESION BIOPSY EXCISION performed by Akilah Montiel DO at [place] [address]  2023    thoracic    STIMULATOR SURGERY N/A 1/3/2023    THORACIC T9-T10 LAMINOPLASTY FOR SPINAL CORD STIMULATOR IMPLANTATION performed by Roman Gonzalez DO at [address] EXTRACTION       Family History   Problem Relation Age of Onset    No Known Problems Mother     Heart Disease Father     Diabetes Father     Other Sister         sepsis in blood stream and pneumonia    Alcohol Abuse Brother     Drug Abuse Brother     Alcohol Abuse Brother     Drug Abuse Brother     Diabetes Maternal Grandmother     Diabetes Maternal Grandfather     Diabetes Paternal Grandmother     Diabetes Paternal Grandfather     Breast Cancer Neg Hx     Colon Cancer Neg Hx     Ovarian Cancer Neg Hx      Social History     Tobacco Use   Smoking Status Former    Types: Cigarettes    Quit date: 2016    Years since quittin.1   Smokeless Tobacco Never     Social History     Substance and Sexual Activity   Alcohol Use Never Alcohol/week: 0.0 standard drinks     Current Outpatient Medications   Medication Sig Dispense Refill    ergocalciferol (ERGOCALCIFEROL) 1.25 MG (91352 UT) capsule Take 1 capsule by mouth once a week 4 capsule 3    phentermine (ADIPEX-P) 37.5 MG tablet Take 1 tablet by mouth every morning (before breakfast) for 30 days. Max Daily Amount: 37.5 mg 30 tablet 0    tiZANidine (ZANAFLEX) 4 MG tablet TAKE 1 TABLET BY MOUTH TWICE DAILY AS NEEDED FOR SPAMS 60 tablet 2    celecoxib (CELEBREX) 200 MG capsule Take 1 capsule by mouth daily 30 capsule 2    Adalimumab (HUMIRA PEN) 40 MG/0.4ML PNKT Inject 40 mg SC q2wk 2 each 2    hydrOXYzine pamoate (VISTARIL) 100 MG capsule       pantoprazole (PROTONIX) 40 MG tablet Take 40 mg by mouth daily      lidocaine (LIDODERM) 5 % USE 1 PATCH EXTERNALLY ONCE DAILY AS NEEDED 12 HOURS ON, 12 HOURS OFF 30 patch 1    CAPLYTA 42 MG CAPS Take 42 mg by mouth daily      Melatonin 10 MG TABS Take 30 mg by mouth      clobetasol (TEMOVATE) 0.05 % cream Apply topically 2 times daily. , as needed, for itching or scaling. 60 g 3    clobetasol (TEMOVATE) 0.05 % external solution Apply topically 2 times daily, as needed. 50 mL 2    buPROPion (WELLBUTRIN XL) 300 MG extended release tablet TAKE 1 TABLET BY MOUTH IN THE MORNING      venlafaxine (EFFEXOR) 75 MG tablet Take 75 mg by mouth daily      Rimegepant Sulfate 75 MG TBDP Take 1 each by mouth daily 30 tablet 0    SUMAtriptan (IMITREX) 50 MG tablet Take 1 tablet by mouth once as needed for Migraine 9 tablet 5    Semaglutide-Weight Management (WEGOVY) 0.25 MG/0.5ML SOAJ SC injection Inject 0.25 mg into the skin every 7 days (Patient not taking: No sig reported) 2 mL 0    phentermine 37.5 MG capsule Take 37.5 mg by mouth every morning.  (Patient not taking: No sig reported)      hydrOXYzine HCl (ATARAX) 50 MG tablet Take 50 mg by mouth in the morning and at bedtime (Patient not taking: Reported on 2/28/2023)       No current facility-administered medications for this visit. Allergies: Allergies   Allergen Reactions    Cat Hair Extract Other (See Comments)     Stuffy nose    Dust Mite Extract Other (See Comments)     Stuffy nose    Molds & Smuts Other (See Comments)     Stuffy nose    Seasonal Other (See Comments)     Stuffy nose       Gynecologic History:  Patient's last menstrual period was 2023. Sexually Active: {YES / IY:97322}  STD History:{YES/NO:834292492}  Birth Control: {YES/NO:793947851}    OB History    Para Term  AB Living   0 0 0 0 0 0   SAB IAB Ectopic Molar Multiple Live Births   0 0 0 0 0 0     ______________________________________________________________________    Review of Systems    REVIEW OFSYSTEMS:        Constitutional:  Unexpected weight change, extreme fatigue, night sweats              {yes/no:856464}  Skin:                           Rashes, moles   {yes/no:946528}  Neurological:  Frequentheadaches, seizures         {yes/no:202525}  Ophthalmic:  Recent visual changes {yes/no:910315}  ENT:   Difficulty swallowing  {yes/no:198499}  Breast:              Masses, pain, nipple discharge                            {yes/no:454279}     Respiratory:  Shortness of breath, coughing           {yes/no:485226}    Cardiovascular: Chest pain   {yes/no:115822}     Gastrointestinal: Chronic diarrhea/constipation, nausea/vomiting           {yes/no:469701}   Urogenital:  Urinary incontinence, frequency, urgency          {yes/no:342399}                                         Heavy/irregular periods           {yes/no:433996}                                      Vaginal discharge                   {yes/no:886496}  Hematological: Bruises easy   {yes/no:655694}     Endocrine:  Hot flashes   {yes/no:595583}     Hot/Cold Intolerance  {yes/no:086238}    Psychological:            Mood and affect were within normal limits.         {yes/no:687592}                 Physical Exam    Physical Exam:    Vitals:    23 0957   BP: 122/82 Site: Left Upper Arm   Position: Sitting   Cuff Size: Medium Adult   Weight: 252 lb (114.3 kg)   Height: 5' 3\" (1.6 m)       General Appearance: This  is a well developed, well nourished, well groomed female. Her BMI was reviewed. Nutritional decision making andexercise were discussed. Neurological:  The patient is alert and oriented to time,place, person, and situation    Skin:  A brief inspection of the skin revealed no rashes or lesions. Neck:  The neck was supple. Respiratory: There was unlabored respiratory effort. Lungs clear to ascultation. Cardiovascular: The patients extremities were without calf tenderness or edema. Heart with a regular rate and rhythm. Abdomen: The abdomen was soft and non-tender with no guarding, rebound or rigidity. No hernias were appreciated. Breast:   The patients breasts were symmetrical.  There were no masses, discharge or retractions noted. Self breast exams were reviewed. Pelvic Exam:  The external genitalia was with a normal appearance. The vaginal vault was normal. There were no cystocele, rectocele, or enterocele appreciated. There was no vaginal discharge. The cervix was without lesions. There was no cervical motion tenderness. The uterus was mobile, midline and regular. The adnexa no fullness, tenderness or masses appreciated. ASSESSMENT: ***    29 y.o. Female; Annual   Diagnosis Orders   1. Well woman exam with routine gynecological exam  PAP Smear                        PLAN:  - Discussed new papsmear guidelines. - Birth control Discussed. - Smoking risk factors Discussed  - Diet and exercise reviewed. - Routine healthmaintenance per patients PCP.  - Return to clinic in 1 year or earlier with questions, problems, concerns. No follow-ups on file.         Electronically signed by FUNMILAYO Casillas CNP on 2/28/2023 at 10:07 AM

## 2023-03-01 ENCOUNTER — OFFICE VISIT (OUTPATIENT)
Dept: NEUROSURGERY | Age: 33
End: 2023-03-01

## 2023-03-01 VITALS
DIASTOLIC BLOOD PRESSURE: 76 MMHG | HEIGHT: 63 IN | HEART RATE: 82 BPM | SYSTOLIC BLOOD PRESSURE: 113 MMHG | WEIGHT: 252 LBS | BODY MASS INDEX: 44.65 KG/M2 | OXYGEN SATURATION: 98 %

## 2023-03-01 DIAGNOSIS — M47.26 OTHER SPONDYLOSIS WITH RADICULOPATHY, LUMBAR REGION: Primary | ICD-10-CM

## 2023-03-01 LAB
HPV SAMPLE: NORMAL
HPV, GENOTYPE 16: NOT DETECTED
HPV, GENOTYPE 18: NOT DETECTED
HPV, HIGH RISK OTHER: NOT DETECTED
HPV, INTERPRETATION: NORMAL
SPECIMEN DESCRIPTION: NORMAL

## 2023-03-01 PROCEDURE — 99024 POSTOP FOLLOW-UP VISIT: CPT | Performed by: NEUROLOGICAL SURGERY

## 2023-03-01 NOTE — PROGRESS NOTES
[address]  [facility] [address] [facility] [address]  Dept: [phone]    Patient:  Colette Anderson  YOB: 1990  Date: 3/1/23    The patient is a 29 y.o. female who presents today for consult of the following problems:     Chief Complaint   Patient presents with    Back Pain     spondylosis with radiculopathy, lumbar region    Results     MRI              HPI:     Colette Anderson is a 29 y.o. female on whom neurosurgical consultation was requested by Sadia Rivers MD for management of chronic pain. Patient was having significant axial as well as lower extremity pain. States that she has been quite satisfied with the function of the stimulator which has been able to give her greater than 95% pain relief. She is having some bothersome symptoms at the battery site on the right side where she states if she lays a certain way she does feel like it is poking out and is somewhat bothersome. Filomena Castellanos       History:     Past Medical History:   Diagnosis Date    ADHD     Anxiety     Arthritis     Back pain     Bipolar 1 disorder, depressed ([address]     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    COVID-19 vaccine series not administered     DDD (degenerative disc disease), lumbar     Depression     Fibromyalgia     GERD (gastroesophageal reflux disease)     Hearing loss     Knee pain     Learning disability     Lumbar spondylosis     with radiculopathy    Migraine headache     MTHFR (methylene THF reductase) deficiency and homocystinuria (HCC)     Obese     Prediabetes     Psoriasis     on Humira,  to use last dose on 12/22/22 per dermatologist for upcoming SCS surgery    RLS (restless legs syndrome)     Under care of service provider 12/20/2022    rrx-Xcpkwqdr-rlltpsgClari silva-last visit oct 2022    Under care of service provider 12/20/2022    gi-estrella Castro [facility]-last visit nov 2022 Under care of service provider 12/20/2022    pain-Aundrea Monroyyanet-last visit dec 2022    Under care of service provider 12/20/2022    behavioral health-lázaro turner- sofia-last visit dec 2022    Under care of service provider 12/20/2022    tsuakcfhk-ftqdvfv-zz vincent-last visit dec 2022     Past Surgical History:   Procedure Laterality Date    ADENOIDECTOMY      KNEE ARTHROSCOPY Left     OTHER SURGICAL HISTORY Bilateral 03/25/2021    lumbar DENIZ    OTHER SURGICAL HISTORY      bone spurs removed from both feet    PAIN MANAGEMENT PROCEDURE Bilateral 12/14/2020    EPIDURAL STEROID INJECTION BILATERAL L5 performed by Anahy Brooks MD at [facility] Bilateral 01/04/2021    EPIDURAL STEROID INJECTION BILATERAL L5 performed by Anahy Brooks MD at [facility] Bilateral 03/25/2021    LUMBAR FACET STEROID INJECTION BILATERAL L4 / 5 performed by [facility]y Brooks MD at [facility]  04/15/2021    MID BACK CYST LESION BIOPSY EXCISION    SKIN BIOPSY N/A 04/15/2021    MID BACK CYST LESION BIOPSY EXCISION performed by Ezekiel Arboleda DO at [place] [address]  01/03/2023    thoracic    STIMULATOR SURGERY N/A 1/3/2023    THORACIC T9-T10 LAMINOPLASTY FOR SPINAL CORD STIMULATOR IMPLANTATION performed by Sree Yusuf DO at [address] EXTRACTION       Family History   Problem Relation Age of Onset    No Known Problems Mother     Heart Disease Father     Diabetes Father     Other Sister         sepsis in blood stream and pneumonia    Alcohol Abuse Brother     Drug Abuse Brother     Alcohol Abuse Brother     Drug Abuse Brother     Diabetes Maternal Grandmother     Diabetes Maternal Grandfather     Diabetes Paternal Grandmother     Diabetes Paternal Grandfather     Breast Cancer Neg Hx     Colon Cancer Neg Hx     Ovarian Cancer Neg Hx      Current Outpatient Medications on File Prior to Visit   Medication Sig Dispense Refill    ergocalciferol (ERGOCALCIFEROL) 1.25 MG (23668 UT) capsule Take 1 capsule by mouth once a week 4 capsule 3    phentermine (ADIPEX-P) 37.5 MG tablet Take 1 tablet by mouth every morning (before breakfast) for 30 days. Max Daily Amount: 37.5 mg 30 tablet 0    tiZANidine (ZANAFLEX) 4 MG tablet TAKE 1 TABLET BY MOUTH TWICE DAILY AS NEEDED FOR SPAMS 60 tablet 2    celecoxib (CELEBREX) 200 MG capsule Take 1 capsule by mouth daily 30 capsule 2    Adalimumab (HUMIRA PEN) 40 MG/0.4ML PNKT Inject 40 mg SC q2wk 2 each 2    hydrOXYzine pamoate (VISTARIL) 100 MG capsule       Rimegepant Sulfate 75 MG TBDP Take 1 each by mouth daily 30 tablet 0    pantoprazole (PROTONIX) 40 MG tablet Take 40 mg by mouth daily      lidocaine (LIDODERM) 5 % USE 1 PATCH EXTERNALLY ONCE DAILY AS NEEDED 12 HOURS ON, 12 HOURS OFF 30 patch 1    CAPLYTA 42 MG CAPS Take 42 mg by mouth daily      Melatonin 10 MG TABS Take 30 mg by mouth      clobetasol (TEMOVATE) 0.05 % cream Apply topically 2 times daily. , as needed, for itching or scaling. 60 g 3    clobetasol (TEMOVATE) 0.05 % external solution Apply topically 2 times daily, as needed. 50 mL 2    buPROPion (WELLBUTRIN XL) 300 MG extended release tablet TAKE 1 TABLET BY MOUTH IN THE MORNING      venlafaxine (EFFEXOR) 75 MG tablet Take 75 mg by mouth daily      hydrOXYzine HCl (ATARAX) 50 MG tablet Take 50 mg by mouth in the morning and at bedtime      SUMAtriptan (IMITREX) 50 MG tablet Take 1 tablet by mouth once as needed for Migraine 9 tablet 5    Semaglutide-Weight Management (WEGOVY) 0.25 MG/0.5ML SOAJ SC injection Inject 0.25 mg into the skin every 7 days (Patient not taking: No sig reported) 2 mL 0    phentermine 37.5 MG capsule Take 37.5 mg by mouth every morning. (Patient not taking: No sig reported)       No current facility-administered medications on file prior to visit.      Social History     Tobacco Use    Smoking status: Former     Types: Cigarettes Quit date: 2016     Years since quittin.1    Smokeless tobacco: Never   Vaping Use    Vaping Use: Former   Substance Use Topics    Alcohol use: Never     Alcohol/week: 0.0 standard drinks    Drug use: No       Allergies   Allergen Reactions    Cat Hair Extract Other (See Comments)     Stuffy nose    Dust Mite Extract Other (See Comments)     Stuffy nose    Molds & Smuts Other (See Comments)     Stuffy nose    Seasonal Other (See Comments)     Stuffy nose       Review of Systems  ROS: None    Physical Exam:      /76   Pulse 82   Ht 5' 3\" (1.6 m)   Wt 252 lb (114.3 kg)   LMP 2023   SpO2 98%   BMI 44.64 kg/m²   Estimated body mass index is 44.64 kg/m² as calculated from the following:    Height as of this encounter: 5' 3\" (1.6 m). Weight as of this encounter: 252 lb (114.3 kg). General:  Edith Ware is a 29y.o. year old female who appears her stated age. HEENT: Normocephalic atraumatic. Neck supple. Chest: regular rate; pulses equal. Equal chest rise and fall  Abdomen: Soft nondistended.    Ext: DP equal with good capillary refill  Neuro    Mentation  Appropriate affect   oriented    Cranial Nerves:   Pupils equal and reactive to light  Extraocular motion intact  Face symmetric  No dysarthria  v1-3 sensation symmetric, masseter tone symmetric  Hearing symmetric and intact to finger rub    Sensation:   Intact    Motor  L deltoid 5/5; R deltoid 5/5  L biceps 5/5; R biceps 5/5  L triceps 5/5; R triceps 5/5  L wrist extension 5/5; R wrist extension 5/5  L intrinsics 5/5; R intrinsics 5/5     L iliopsoas 5/5 , R iliopsoas 5/5  L quadriceps 5/5; R quadriceps 5/5  L Dorsiflexion 5/5; R dorsiflexion 5/5  L Plantarflexion 5/5; R plantarflexion 5/5  L EHL 5/5; R EHL 5/5    Reflexes  L Brachioradialis 2+/4; R brachioradialis 2+/4  L Biceps 2+/4; R Biceps 2+/4  L Triceps 2+/4; R Triceps 2+/4  L Patellar 2+/4: R Patellar 2+/4  L Achilles 2+/4; R Achilles 2+/4    sharmin L: neg  moes R: neg  Clonus L: neg  Clonus R: neg  Babinski L: up  Babinski R; up    Studies Review:     None    Assessment and Plan:      1. Other spondylosis with radiculopathy, lumbar region          Plan: Doing quite well overall and satisfied with the function of the stimulator. I did discuss the battery site with her which is somewhat bothersome. I did discuss the option of trying to either barrier the generator deeper so it is not as close to the surface or finding a different location altogether. She would like to hold off at this point and states that it is bothersome but it is tolerable at this point. I did relate to her that if it does get to the point where it is very bothersome where she would like something done she can reach out and we can attempt to reposition the generator as appropriate. Followup: No follow-ups on file. Prescriptions Ordered:  No orders of the defined types were placed in this encounter. Orders Placed:  No orders of the defined types were placed in this encounter. Electronically signed by Julian Friedman DO on 3/1/2023 at 10:32 AM    Please note that this chart was generated using voice recognition Dragon dictation software. Although every effort was made to ensure the accuracy of this automated transcription, some errors in transcription may have occurred.

## 2023-03-09 ENCOUNTER — OFFICE VISIT (OUTPATIENT)
Dept: DERMATOLOGY | Age: 33
End: 2023-03-09
Payer: MEDICAID

## 2023-03-09 VITALS
WEIGHT: 251 LBS | BODY MASS INDEX: 44.47 KG/M2 | TEMPERATURE: 98.1 F | HEIGHT: 63 IN | DIASTOLIC BLOOD PRESSURE: 73 MMHG | OXYGEN SATURATION: 98 % | HEART RATE: 112 BPM | SYSTOLIC BLOOD PRESSURE: 107 MMHG

## 2023-03-09 DIAGNOSIS — L40.9 PSORIASIS: Primary | ICD-10-CM

## 2023-03-09 PROCEDURE — 99214 OFFICE O/P EST MOD 30 MIN: CPT | Performed by: PHYSICIAN ASSISTANT

## 2023-03-09 RX ORDER — KETOCONAZOLE 20 MG/ML
SHAMPOO TOPICAL
Qty: 120 ML | Refills: 2 | Status: SHIPPED | OUTPATIENT
Start: 2023-03-09

## 2023-03-09 RX ORDER — TRIAMCINOLONE ACETONIDE 0.25 MG/G
CREAM TOPICAL
Qty: 80 G | Refills: 2 | Status: SHIPPED | OUTPATIENT
Start: 2023-03-09

## 2023-03-09 NOTE — PROGRESS NOTES
Dermatology Patient Note  [facility], [facility] DERMATOLOGY  [address]  [address]  [place] [address]  Dept: [phone]  Dept Fax: [phone]      VISITDATE: 3/9/2023   REFERRING PROVIDER: No ref. provider found      Maira Villalba is a 32 y.o. female  who presents today in the office for:    Follow-up and Psoriasis (Pt states her psoriasis is still bad in her ears, elbows, and nose. Pt states it went from the back of her head to the front. )      HISTORY OF PRESENT ILLNESS:  As above.  Pt is on Humira.  No infections or hospitalizations.  No numbness or visual changes.  She also uses clobetasol cream prn on elbows, but admits to picking at the scale more than using her creams.  Scalp, elbows, ears, and lateral nasal alar folds have had some breakthrough with Winter.    MEDICAL PROBLEMS:  Patient Active Problem List    Diagnosis Date Noted    Lumbosacral spondylosis without myelopathy 11/30/2022     Priority: Medium    Spinal cord stimulator status 03/21/2022    DDD (degenerative disc disease), lumbar 11/23/2021    Lumbar facet joint syndrome 10/19/2021    Arthritis 04/05/2021    Knee pain 04/05/2021     Right knee, ongoing.      Left lumbar radiculitis 01/04/2021    Lumbar disc herniation 11/24/2020    Chronic bilateral low back pain with bilateral sciatica 12/04/2019    Bipolar 1 disorder, depressed (HCC) 10/22/2019    Encounter for Nexplanon removal 08/15/2018    Encounter for initial prescription of injectable contraceptive 08/15/2018    Back pain, chronic 01/19/2018    Attention or concentration deficit 12/06/2016    Irregular bleeding 05/03/2016       CURRENT MEDICATIONS:   Current Outpatient Medications   Medication Sig Dispense Refill    triamcinolone (KENALOG) 0.025 % cream Apply to rash on nose and ears twice daily, as needed. 80 g 2    ketoconazole (NIZORAL) 2 % shampoo Apply 2-3 times weekly to scalp, leave on for five minutes prior to  washing off 120 mL 2    ergocalciferol (ERGOCALCIFEROL) 1.25 MG (90698 UT) capsule Take 1 capsule by mouth once a week 4 capsule 3    phentermine (ADIPEX-P) 37.5 MG tablet Take 1 tablet by mouth every morning (before breakfast) for 30 days. Max Daily Amount: 37.5 mg 30 tablet 0    tiZANidine (ZANAFLEX) 4 MG tablet TAKE 1 TABLET BY MOUTH TWICE DAILY AS NEEDED FOR SPAMS 60 tablet 2    celecoxib (CELEBREX) 200 MG capsule Take 1 capsule by mouth daily 30 capsule 2    Adalimumab (HUMIRA PEN) 40 MG/0.4ML PNKT Inject 40 mg SC q2wk 2 each 2    hydrOXYzine pamoate (VISTARIL) 100 MG capsule       Rimegepant Sulfate 75 MG TBDP Take 1 each by mouth daily 30 tablet 0    pantoprazole (PROTONIX) 40 MG tablet Take 40 mg by mouth daily      lidocaine (LIDODERM) 5 % USE 1 PATCH EXTERNALLY ONCE DAILY AS NEEDED 12 HOURS ON, 12 HOURS OFF 30 patch 1    CAPLYTA 42 MG CAPS Take 42 mg by mouth daily      Melatonin 10 MG TABS Take 30 mg by mouth      clobetasol (TEMOVATE) 0.05 % cream Apply topically 2 times daily. , as needed, for itching or scaling. 60 g 3    clobetasol (TEMOVATE) 0.05 % external solution Apply topically 2 times daily, as needed. 50 mL 2    buPROPion (WELLBUTRIN XL) 300 MG extended release tablet TAKE 1 TABLET BY MOUTH IN THE MORNING      venlafaxine (EFFEXOR) 75 MG tablet Take 75 mg by mouth daily      hydrOXYzine HCl (ATARAX) 50 MG tablet Take 50 mg by mouth in the morning and at bedtime      SUMAtriptan (IMITREX) 50 MG tablet Take 1 tablet by mouth once as needed for Migraine 9 tablet 5    Semaglutide-Weight Management (WEGOVY) 0.25 MG/0.5ML SOAJ SC injection Inject 0.25 mg into the skin every 7 days (Patient not taking: No sig reported) 2 mL 0    phentermine 37.5 MG capsule Take 37.5 mg by mouth every morning. (Patient not taking: No sig reported)       No current facility-administered medications for this visit.        ALLERGIES:   Allergies   Allergen Reactions    Cat Hair Extract Other (See Comments)     Stuffy nose Dust Mite Extract Other (See Comments)     Stuffy nose    Molds & Smuts Other (See Comments)     Stuffy nose    Seasonal Other (See Comments)     Stuffy nose       SOCIAL HISTORY:  Social History     Tobacco Use    Smoking status: Former     Types: Cigarettes     Quit date: 2016     Years since quittin.1    Smokeless tobacco: Never   Substance Use Topics    Alcohol use: Never     Alcohol/week: 0.0 standard drinks       Pertinent ROS:  Review of Systems  Skin: Denies any new changing, growing or bleeding lesions or rashes except as described in the HPI   Constitutional: Denies fevers, chills, and malaise. PHYSICAL EXAM:   /73 (Site: Left Upper Arm, Position: Sitting)   Pulse (!) 112   Temp 98.1 °F (36.7 °C) (Temporal)   Ht 5' 3\" (1.6 m)   Wt 251 lb (113.9 kg)   LMP 2023   SpO2 98%   BMI 44.46 kg/m²     The patient is generally well appearing, well nourished, alert and conversational. Affect is normal.    Cutaneous Exam:  Physical Exam  Sun-exposed skin: head/face, neck, both arms, digits and nails were examined. Facial covering was removed during examination. Diagnoses/exam findings/medical history pertinent to this visit are listed below:    Assessment:   Diagnosis Orders   1. Psoriasis  triamcinolone (KENALOG) 0.025 % cream    ketoconazole (NIZORAL) 2 % shampoo           Plan:  1. Psoriasis  - stable chronic illness   - Biologic continuation: Patient understands the increased risk of infection and potential increased risk of malignancy as a result of immunosuppression. Patient denies occurrence of infections aside from common colds or gastroenteritis. Patient was counseled to call if he/she develops any symptoms concerning for infection. Patient understands the need for continued lab monitoring while on the medication.  Patient will not receive live vaccines while on the medication.    - Continue HUMIRA  - triamcinolone (KENALOG) 0.025 % cream; Apply to rash on nose and ears twice daily, as needed. Dispense: 80 g; Refill: 2  - ketoconazole (NIZORAL) 2 % shampoo; Apply 2-3 times weekly to scalp, leave on for five minutes prior to washing off  Dispense: 120 mL; Refill: 2    RTC 6M    Future Appointments   Date Time Provider [facility]   3/27/2023 10:45 AM Roma Hernández MD [place] [id number] [facility]   5/19/2023  4:00 PM FUNMILAYO De Souza CNP Pain [facility]   9/11/2023 10:15 AM Elle Rogers PA-C  derm [facility]         There are no Patient Instructions on file for this visit.       Electronically signed by Elle Rogers PA-C on 3/9/23 at 2:35 PM EST

## 2023-03-14 NOTE — TELEPHONE ENCOUNTER
Maira Villalba is calling to request a refill on the following medication(s):    Last Visit Date (If Applicable):  2/27/2023    Next Visit Date:    3/27/2023    Medication Request:  Requested Prescriptions     Pending Prescriptions Disp Refills    Rimegepant Sulfate 75 MG TBDP 30 tablet 0     Sig: Take 1 each by mouth daily

## 2023-03-27 ENCOUNTER — OFFICE VISIT (OUTPATIENT)
Dept: FAMILY MEDICINE CLINIC | Age: 33
End: 2023-03-27
Payer: MEDICAID

## 2023-03-27 VITALS
OXYGEN SATURATION: 98 % | SYSTOLIC BLOOD PRESSURE: 110 MMHG | WEIGHT: 250 LBS | BODY MASS INDEX: 44.29 KG/M2 | HEART RATE: 88 BPM | TEMPERATURE: 97.4 F | DIASTOLIC BLOOD PRESSURE: 75 MMHG

## 2023-03-27 DIAGNOSIS — E66.01 MORBID OBESITY WITH BMI OF 45.0-49.9, ADULT (HCC): ICD-10-CM

## 2023-03-27 PROCEDURE — 99213 OFFICE O/P EST LOW 20 MIN: CPT | Performed by: FAMILY MEDICINE

## 2023-03-27 RX ORDER — PHENTERMINE HYDROCHLORIDE 37.5 MG/1
37.5 TABLET ORAL
Qty: 30 TABLET | Refills: 0 | Status: SHIPPED | OUTPATIENT
Start: 2023-03-27 | End: 2023-04-26

## 2023-03-27 NOTE — PROGRESS NOTES
Comments)     Stuffy nose    Molds & Smuts Other (See Comments)     Stuffy nose    Seasonal Other (See Comments)     Stuffy nose       Social History     Tobacco Use    Smoking status: Former     Types: Cigarettes     Quit date: 2016     Years since quittin.2    Smokeless tobacco: Never   Substance Use Topics    Alcohol use: Never     Alcohol/week: 0.0 standard drinks      Body mass index is 44.29 kg/m². /75   Pulse 88   Temp 97.4 °F (36.3 °C)   Wt 250 lb (113.4 kg)   LMP 2023   SpO2 98%   BMI 44.29 kg/m²     Subjective:      HPI    29 y.o. female coming today for weight check. Lost 1 pounds with the 1. prescription of Adipex. Diet: smaller portions. Exercises: walks a lot. Side effects: none    Review of Systems   Constitutional: Negative for fever and unexpected weight change. Pertinent items are noted in HPI. Objective:   Physical Exam  Constitutional: VS (see above). General appearance: normal development, habitus and attention, no deformities. No distress. Eyes: normal conjunctiva and lids. CAV: RRR, no RMG. No edema lower extremities. Pulmo: CTA bilateral, no CWR. Skin: no rashes, lesions or ulcers. Musculoskeletal: normal gait. Nails: no clubbing or cyanosis. Psychiatric: alert and oriented to place, time and person. Normal mood and affect. Assessment:       Diagnosis Orders   1. Morbid obesity with BMI of 45.0-49.9, adult (HCC)  phentermine (ADIPEX-P) 37.5 MG tablet          Plan:      Sec Prescription of Adipex provided. Continue exercise and diet. Follow-up as indicated. Patient will continue current diet and exercise regimen. I discussed with her to exercise at least 30 minutes 5 times a week. Decrease carbohydrate intake. Increase fibers and protein. See me back in 4 weeks for weight check. Call if any changes. Stop Adipex if you have any side effects.       Return in about 4 weeks (around 2023), or if symptoms worsen or fail to improve, for

## 2023-03-28 RX ORDER — PROMETHAZINE HYDROCHLORIDE 25 MG/1
TABLET ORAL
Qty: 12 TABLET | Refills: 0 | Status: SHIPPED | OUTPATIENT
Start: 2023-03-28

## 2023-04-10 RX ORDER — CELECOXIB 200 MG/1
CAPSULE ORAL
Qty: 30 CAPSULE | Refills: 0 | OUTPATIENT
Start: 2023-04-10

## 2023-04-24 ENCOUNTER — OFFICE VISIT (OUTPATIENT)
Dept: FAMILY MEDICINE CLINIC | Age: 33
End: 2023-04-24
Payer: MEDICAID

## 2023-04-24 VITALS
WEIGHT: 239 LBS | HEART RATE: 86 BPM | SYSTOLIC BLOOD PRESSURE: 122 MMHG | TEMPERATURE: 97.9 F | BODY MASS INDEX: 42.34 KG/M2 | OXYGEN SATURATION: 98 % | DIASTOLIC BLOOD PRESSURE: 80 MMHG

## 2023-04-24 DIAGNOSIS — E66.01 MORBID OBESITY WITH BMI OF 45.0-49.9, ADULT (HCC): ICD-10-CM

## 2023-04-24 PROCEDURE — 99213 OFFICE O/P EST LOW 20 MIN: CPT | Performed by: FAMILY MEDICINE

## 2023-04-24 RX ORDER — PHENTERMINE HYDROCHLORIDE 37.5 MG/1
37.5 TABLET ORAL
Qty: 30 TABLET | Refills: 0 | Status: SHIPPED | OUTPATIENT
Start: 2023-04-24 | End: 2023-05-24

## 2023-04-24 RX ORDER — PROMETHAZINE HYDROCHLORIDE 25 MG/1
TABLET ORAL
Qty: 12 TABLET | Refills: 0 | Status: SHIPPED | OUTPATIENT
Start: 2023-04-24

## 2023-04-24 ASSESSMENT — PATIENT HEALTH QUESTIONNAIRE - PHQ9
4. FEELING TIRED OR HAVING LITTLE ENERGY: 0
SUM OF ALL RESPONSES TO PHQ9 QUESTIONS 1 & 2: 0
3. TROUBLE FALLING OR STAYING ASLEEP: 0
7. TROUBLE CONCENTRATING ON THINGS, SUCH AS READING THE NEWSPAPER OR WATCHING TELEVISION: 0
1. LITTLE INTEREST OR PLEASURE IN DOING THINGS: 0
8. MOVING OR SPEAKING SO SLOWLY THAT OTHER PEOPLE COULD HAVE NOTICED. OR THE OPPOSITE, BEING SO FIGETY OR RESTLESS THAT YOU HAVE BEEN MOVING AROUND A LOT MORE THAN USUAL: 0
5. POOR APPETITE OR OVEREATING: 0
SUM OF ALL RESPONSES TO PHQ QUESTIONS 1-9: 0
SUM OF ALL RESPONSES TO PHQ QUESTIONS 1-9: 0
2. FEELING DOWN, DEPRESSED OR HOPELESS: 0
10. IF YOU CHECKED OFF ANY PROBLEMS, HOW DIFFICULT HAVE THESE PROBLEMS MADE IT FOR YOU TO DO YOUR WORK, TAKE CARE OF THINGS AT HOME, OR GET ALONG WITH OTHER PEOPLE: 0
SUM OF ALL RESPONSES TO PHQ QUESTIONS 1-9: 0
6. FEELING BAD ABOUT YOURSELF - OR THAT YOU ARE A FAILURE OR HAVE LET YOURSELF OR YOUR FAMILY DOWN: 0
SUM OF ALL RESPONSES TO PHQ QUESTIONS 1-9: 0
9. THOUGHTS THAT YOU WOULD BE BETTER OFF DEAD, OR OF HURTING YOURSELF: 0

## 2023-04-24 NOTE — PROGRESS NOTES
changes. Stop Adipex if you have any side effects. Return in about 4 weeks (around 5/22/2023), or if symptoms worsen or fail to improve. No orders of the defined types were placed in this encounter. Orders Placed This Encounter   Medications    phentermine (ADIPEX-P) 37.5 MG tablet     Sig: Take 1 tablet by mouth every morning (before breakfast) for 30 days. Max Daily Amount: 37.5 mg     Dispense:  30 tablet     Refill:  0    promethazine (PHENERGAN) 25 MG tablet     Sig: TAKE 1 TABLET BY MOUTH THREE TIMES DAILY AS NEEDED FOR NAUSEA     Dispense:  12 tablet     Refill:  0       Call or return to clinic prn if these symptoms worsen or fail to improve as anticipated. I have reviewed the instructions with the patient, answering all questions to her satisfaction. Maira received counseling on the following healthy behaviors: nutrition, exercise, and medication adherence  Reviewed prior labs and health maintenance. Continue current medications, diet and exercise. Discussed use, benefit, and side effects of prescribed medications. Barriers to medication compliance addressed. Patient given educational materials - see patient instructions. All patient questions answered. Patient voiced understanding.       Electronically signed by Enedelia Suggs MD on 4/24/2023 at 11:25 AM       (Please note that portions of this note were completed with a voice recognition program. Efforts were made to edit the dictations but occasionally words are mis-transcribed.)

## 2023-05-19 ENCOUNTER — OFFICE VISIT (OUTPATIENT)
Dept: PAIN MANAGEMENT | Age: 33
End: 2023-05-19
Payer: MEDICAID

## 2023-05-19 VITALS — WEIGHT: 239 LBS | HEART RATE: 100 BPM | OXYGEN SATURATION: 98 % | BODY MASS INDEX: 42.35 KG/M2 | HEIGHT: 63 IN

## 2023-05-19 DIAGNOSIS — M54.41 CHRONIC BILATERAL LOW BACK PAIN WITH BILATERAL SCIATICA: Primary | ICD-10-CM

## 2023-05-19 DIAGNOSIS — M54.2 CERVICALGIA: ICD-10-CM

## 2023-05-19 DIAGNOSIS — M51.36 DDD (DEGENERATIVE DISC DISEASE), LUMBAR: ICD-10-CM

## 2023-05-19 DIAGNOSIS — G89.29 CHRONIC BILATERAL LOW BACK PAIN WITH BILATERAL SCIATICA: Primary | ICD-10-CM

## 2023-05-19 DIAGNOSIS — M47.817 LUMBOSACRAL SPONDYLOSIS WITHOUT MYELOPATHY: Chronic | ICD-10-CM

## 2023-05-19 DIAGNOSIS — M54.42 CHRONIC BILATERAL LOW BACK PAIN WITH BILATERAL SCIATICA: Primary | ICD-10-CM

## 2023-05-19 PROCEDURE — 99214 OFFICE O/P EST MOD 30 MIN: CPT | Performed by: NURSE PRACTITIONER

## 2023-05-19 RX ORDER — TIZANIDINE 4 MG/1
TABLET ORAL
Qty: 60 TABLET | Refills: 2 | Status: SHIPPED | OUTPATIENT
Start: 2023-05-19

## 2023-05-19 RX ORDER — CELECOXIB 200 MG/1
200 CAPSULE ORAL DAILY
Qty: 30 CAPSULE | Refills: 2 | Status: SHIPPED | OUTPATIENT
Start: 2023-05-19 | End: 2023-06-18

## 2023-05-19 ASSESSMENT — ENCOUNTER SYMPTOMS
BACK PAIN: 1
CONSTIPATION: 0
SHORTNESS OF BREATH: 0
RESPIRATORY NEGATIVE: 1
GASTROINTESTINAL NEGATIVE: 1
COUGH: 0

## 2023-05-19 NOTE — PROGRESS NOTES
(gastroesophageal reflux disease)     Hearing loss     Knee pain     Learning disability     Lumbar spondylosis     with radiculopathy    Migraine headache     MTHFR (methylene THF reductase) deficiency and homocystinuria (HCC)     Obese     Prediabetes     Psoriasis     on Humira,  to use last dose on 12/22/22 per dermatologist for upcoming SCS surgery    RLS (restless legs syndrome)     Under care of service provider 12/20/2022    fyl-Fhbgefqc-szpcxljClari silva-last visit oct 2022    Under care of service provider 12/20/2022    gi-estrella hemphill-last visit nov 2022    Under care of service provider 12/20/2022    pain-Aundrea Millard[place]-last visit dec 2022    Under care of service provider 12/20/2022    behavioral health-lázaro millan-[facility]- [facility]-last visit dec 2022    Under care of service provider 12/20/2022    sdyvmyidx-veqpaat-rm vincent-last visit dec 2022       Past Surgical History:   Procedure Laterality Date    ADENOIDECTOMY      KNEE ARTHROSCOPY Left     OTHER SURGICAL HISTORY Bilateral 03/25/2021    lumbar DENIZ    OTHER SURGICAL HISTORY      bone spurs removed from both feet    PAIN MANAGEMENT PROCEDURE Bilateral 12/14/2020    EPIDURAL STEROID INJECTION BILATERAL L5 performed by Annette Halsted, MD at [facility] Bilateral 01/04/2021    EPIDURAL STEROID INJECTION BILATERAL L5 performed by Annette Halsted, MD at [facility] Bilateral 03/25/2021    LUMBAR FACET STEROID INJECTION BILATERAL L4 / 5 performed by Annette Halsted, MD at [facility]  04/15/2021    MID BACK CYST LESION BIOPSY EXCISION    SKIN BIOPSY N/A 04/15/2021    MID BACK CYST LESION BIOPSY EXCISION performed by Jordyn Lindquist DO at [place] [address]  01/03/2023    thoracic    STIMULATOR SURGERY N/A 1/3/2023    THORACIC T9-T10 LAMINOPLASTY FOR SPINAL CORD STIMULATOR IMPLANTATION performed by Gaye Samuels DO at [place] [address]

## 2023-05-24 ENCOUNTER — TELEPHONE (OUTPATIENT)
Dept: FAMILY MEDICINE CLINIC | Age: 33
End: 2023-05-24

## 2023-06-21 ENCOUNTER — HOSPITAL ENCOUNTER (OUTPATIENT)
Dept: PAIN MANAGEMENT | Facility: CLINIC | Age: 33
Discharge: HOME | End: 2023-06-21
Payer: MEDICAID

## 2023-06-21 VITALS
HEIGHT: 63 IN | BODY MASS INDEX: 41.46 KG/M2 | TEMPERATURE: 97 F | RESPIRATION RATE: 13 BRPM | DIASTOLIC BLOOD PRESSURE: 74 MMHG | WEIGHT: 234 LBS | OXYGEN SATURATION: 98 % | SYSTOLIC BLOOD PRESSURE: 119 MMHG | HEART RATE: 82 BPM

## 2023-06-21 DIAGNOSIS — R52 PAIN MANAGEMENT: ICD-10-CM

## 2023-06-21 DIAGNOSIS — M47.817 LUMBOSACRAL SPONDYLOSIS WITHOUT MYELOPATHY: Primary | Chronic | ICD-10-CM

## 2023-06-21 LAB — HCG, PREGNANCY URINE (POC): NEGATIVE

## 2023-06-21 PROCEDURE — 64636 DESTROY L/S FACET JNT ADDL: CPT

## 2023-06-21 PROCEDURE — 2500000003 HC RX 250 WO HCPCS: Performed by: ANESTHESIOLOGY

## 2023-06-21 PROCEDURE — 6360000002 HC RX W HCPCS: Performed by: ANESTHESIOLOGY

## 2023-06-21 PROCEDURE — 64635 DESTROY LUMB/SAC FACET JNT: CPT

## 2023-06-21 PROCEDURE — 81025 URINE PREGNANCY TEST: CPT

## 2023-06-21 RX ORDER — LIDOCAINE HYDROCHLORIDE 40 MG/ML
INJECTION, SOLUTION RETROBULBAR; TOPICAL
Status: COMPLETED | OUTPATIENT
Start: 2023-06-21 | End: 2023-06-21

## 2023-06-21 RX ORDER — MIDAZOLAM HYDROCHLORIDE 2 MG/2ML
INJECTION, SOLUTION INTRAMUSCULAR; INTRAVENOUS
Status: COMPLETED | OUTPATIENT
Start: 2023-06-21 | End: 2023-06-21

## 2023-06-21 RX ORDER — LIDOCAINE HYDROCHLORIDE 10 MG/ML
INJECTION, SOLUTION EPIDURAL; INFILTRATION; INTRACAUDAL; PERINEURAL
Status: COMPLETED | OUTPATIENT
Start: 2023-06-21 | End: 2023-06-21

## 2023-06-21 RX ORDER — FENTANYL CITRATE 50 UG/ML
INJECTION, SOLUTION INTRAMUSCULAR; INTRAVENOUS
Status: COMPLETED | OUTPATIENT
Start: 2023-06-21 | End: 2023-06-21

## 2023-06-21 RX ADMIN — MIDAZOLAM HYDROCHLORIDE 2 MG: 1 INJECTION, SOLUTION INTRAMUSCULAR; INTRAVENOUS at 09:29

## 2023-06-21 RX ADMIN — LIDOCAINE HYDROCHLORIDE 5 ML: 10 INJECTION, SOLUTION EPIDURAL; INFILTRATION; INTRACAUDAL at 09:29

## 2023-06-21 RX ADMIN — LIDOCAINE HYDROCHLORIDE 5 ML: 40 SOLUTION RETROBULBAR; TOPICAL at 09:34

## 2023-06-21 RX ADMIN — FENTANYL CITRATE 50 MCG: 50 INJECTION, SOLUTION INTRAMUSCULAR; INTRAVENOUS at 09:29

## 2023-06-21 RX ADMIN — LIDOCAINE HYDROCHLORIDE 5 ML: 10 INJECTION, SOLUTION EPIDURAL; INFILTRATION; INTRACAUDAL at 09:36

## 2023-06-21 RX ADMIN — FENTANYL CITRATE 50 MCG: 50 INJECTION, SOLUTION INTRAMUSCULAR; INTRAVENOUS at 09:41

## 2023-06-21 ASSESSMENT — PAIN DESCRIPTION - DESCRIPTORS: DESCRIPTORS: THROBBING

## 2023-06-21 NOTE — H&P
Pain Pre-Op H&P Note    Carlitos Clifford MD    HPI: Nicole Reid  presents with     Pt c/o severe back pain located in the lower lumbar for over 5 years. No known injury or surgery to the area. Reports occ radiation down to hips without numbness/tingling  Pt had  LESI in 2020 which helped her radicular pain   Pt also c/o  thoracic pain over scapular area.  Suggested exercises stretches and massage therapy to help with that area   Was evaluated by the spine surgeon at [facility] and is not advised for any surgery  Lumbar MRI 1/2022 multilevel degenerative changes mainly seen at L4-5 and L5-S1 level mild canal stenosis mainly at L5-S1 level   Thoracic MRI 9/22 multilevel degenerative changes without canal stenosis or foraminal  narrowing  Pt had bilat lumbar RFA  of L4, L5 AND SACRAL ALA for L4/5 AND L5/S1 facet joints done 11/30/22 and reported 75-80% relief of pain and improved activity tolerance pain is returning and she would like to repeat     Past Medical History:   Diagnosis Date    ADHD     Anxiety     Arthritis     Back pain     Bipolar 1 disorder, depressed ([address]     Brain injury [facility])     age 4-spinal meningitis    COVID-19 vaccine series not administered     DDD (degenerative disc disease), lumbar     Depression     Fibromyalgia     GERD (gastroesophageal reflux disease)     Hearing loss     Knee pain     Learning disability     Lumbar spondylosis     with radiculopathy    Migraine headache     MTHFR (methylene THF reductase) deficiency and homocystinuria ([address]     Obese     Prediabetes     Psoriasis     on Humira,  to use last dose on 12/22/22 per dermatologist for upcoming SCS surgery    RLS (restless legs syndrome)     Under care of service provider 12/20/2022    lgq-Oygcvdsa-ccifbdxClari silva-last visit oct 2022    Under care of service provider 12/20/2022    gi-estrella hemphill-last visit nov 2022    Under care of service provider 12/20/2022    pain-Aundrea Jorge-last

## 2023-06-21 NOTE — OP NOTE
Preoperative Diagnosis: Lumbar spondylosis w/o myelopathy, chronic low back pain  Postoperative Diagnosis: Lumbar spondylosis w/o myelopathy, chronic low back pain  SEDATION: SEE SEDATION NOTE  BLOOD LOSS: NONE    Procedure Performed:  :Radiofrequency ablation of median branches at the Transverse processes of L4, L5 AND SACRAL ALA for L4/5 AND L5/S1 facet joints on Bilateral under fluoroscopic guidance with IV sedation    Procedure:    After starting an IV, the patient was taken to procedure room. The patient was placed in prone position and skin over the back was prepped and draped in sterile manner. Standard monitors were connected and vitals were monitored during the case and they remained stable during the procedure. A meaningful communication was kept up with the patient throughout the procedure. Then using fluoroscopy the junction of the transverse process of the target vertebra with the superior process of the facet joint was observed and the view was optimized. The skin and deep tissues were infiltrated with 2 ml of  1 % lidocaine. The RF canula with the 10 mm active tip was introduced through the skin wheal under fluoroscopy guidance such that the tip of the needle lies in the groove of the transverse process with the superior processes of the facet joint. Then a lateral and AP view of the lumbar spine was obtained to make sure the tip of needle is not in the neural foramen. Then electric impedence was checked to make sure it is acceptable. Then a sensory stimulus was applied at 50 Hz up to 0.5 volt and concordant pain symptoms were reproduced. Then a motor stimulus was applied at 2 Hz up to 2 volts or 3 x times the sensory stimulus and no motor stimulation was seen in lower extremities. Some multifidus stimulus was seen. Then after negative aspiration 1 ml of 4% lidocaine was injected through the needle at each level. The radiofrequency lesion was done at 85 degrees centigrade for 110

## 2023-06-21 NOTE — DISCHARGE INSTRUCTIONS
Discharge Instructions following Sedation or Anesthesia:  You have  received  a sedative/anesthetic therefore, you should not consume any alcoholic beverages for minimum of 12 hours. Do not drive or operate machinery for 24 hours. Do not sign legal documents for 24 hours. Dizziness, drowsiness, and unsteadiness may occur. Rest when need to. Increase diet as tolerated. Keep up on fluids if diet allows. General Instructions:  Do not take a tub bath for 72 hours after procedure (this includes hot tubs and swimming pools). You may shower, but avoid hot water to injection site. Avoid strenuous activity TODAY especially if you experience dizziness. Remove band-aid the next day. Wash off any residual iodine   Do not use heat, heating pad, or any other heating device over the injection site for 3 days after the procedure. If you experience pain after your procedure, you may continue with your current pain medication as prescribed. (DO NOT INCREASE YOUR PAIN MEDICATION WITHOUT TALKING TO DOCTOR)  Soreness and pain at injection site is common, may use ice to reduce soreness.     [facility] at [phone] if you experience:   Fever, chills or temperature over 100    Vomiting, Headache, persistent stiff neck, nausea, blurred vision   Difficulty in urinating or unable to urinate with 8 hours   Increase in weakness, numbness or loss of function   Increased redness, swelling or drainage at the injection site

## 2023-11-09 ENCOUNTER — TELEPHONE (OUTPATIENT)
Dept: PAIN MANAGEMENT | Age: 33
End: 2023-11-09

## 2024-11-07 NOTE — TELEPHONE ENCOUNTER
Patient called in stating that you put her on the medication Wegovy and she stated she has went to 3 different pharmacies and no one has this medication due to it being on back order    She wants to know what should she do about this.     Please advise 06-Nov-2024

## 2025-05-14 ENCOUNTER — OFFICE VISIT (OUTPATIENT)
Dept: NEUROSURGERY | Age: 35
End: 2025-05-14
Payer: MEDICAID

## 2025-05-14 VITALS
HEIGHT: 63 IN | HEART RATE: 88 BPM | BODY MASS INDEX: 46.1 KG/M2 | DIASTOLIC BLOOD PRESSURE: 79 MMHG | SYSTOLIC BLOOD PRESSURE: 104 MMHG | WEIGHT: 260.2 LBS

## 2025-05-14 DIAGNOSIS — Z96.89 SPINAL CORD STIMULATOR STATUS: Primary | ICD-10-CM

## 2025-05-14 PROCEDURE — 99214 OFFICE O/P EST MOD 30 MIN: CPT

## 2025-05-14 RX ORDER — UBROGEPANT 100 MG/1
100 TABLET ORAL
COMMUNITY
Start: 2024-07-17

## 2025-05-14 RX ORDER — OMEPRAZOLE 20 MG/1
20 CAPSULE, DELAYED RELEASE ORAL
COMMUNITY
Start: 2025-03-10

## 2025-05-14 RX ORDER — FREMANEZUMAB-VFRM 225 MG/1.5ML
225 INJECTION SUBCUTANEOUS
COMMUNITY
Start: 2024-07-17

## 2025-05-14 NOTE — PROGRESS NOTES
MTHFR (methylene THF reductase) deficiency and homocystinuria     Obese     Prediabetes     Psoriasis     on Humira,  to use last dose on 12/22/22 per dermatologist for upcoming SCS surgery    RLS (restless legs syndrome)     Under care of service provider 12/20/2022    cmx-Aqweptko-nbeqrrkClari silva-last visit oct 2022    Under care of service provider 12/20/2022    gi-estrella hemphill-last visit nov 2022    Under care of service provider 12/20/2022    pain-Aundrea Jorge-last visit dec 2022    Under care of service provider 12/20/2022    behavioral health-lázaro millan-keshav- [facility]-last visit dec 2022    Under care of service provider 12/20/2022    whawtwvid-fynhzks-em vincent-last visit dec 2022     Past Surgical History:   Procedure Laterality Date    ADENOIDECTOMY      KNEE ARTHROSCOPY Left     OTHER SURGICAL HISTORY Bilateral 03/25/2021    lumbar DENIZ    OTHER SURGICAL HISTORY      bone spurs removed from both feet    PAIN MANAGEMENT PROCEDURE Bilateral 12/14/2020    EPIDURAL STEROID INJECTION BILATERAL L5 performed by Griffin Guzman MD at [facility] OR    PAIN MANAGEMENT PROCEDURE Bilateral 01/04/2021    EPIDURAL STEROID INJECTION BILATERAL L5 performed by Griffin Guzman MD at [facility] OR    PAIN MANAGEMENT PROCEDURE Bilateral 03/25/2021    LUMBAR FACET STEROID INJECTION BILATERAL L4 / 5 performed by Griffin Guzman MD at [facility] OR    SKIN BIOPSY  04/15/2021    MID BACK CYST LESION BIOPSY EXCISION    SKIN BIOPSY N/A 04/15/2021    MID BACK CYST LESION BIOPSY EXCISION performed by Gayle León DO at [facility] OR    SPINAL CORD STIMULATOR SURGERY  01/03/2023    thoracic    STIMULATOR SURGERY N/A 1/3/2023    THORACIC T9-T10 LAMINOPLASTY FOR SPINAL CORD STIMULATOR IMPLANTATION performed by Raza Rivas DO at [facility] OR    TONSILLECTOMY      WISDOM TOOTH EXTRACTION       Family History   Problem Relation Age of Onset    No Known Problems Mother     Heart Disease Father     Diabetes Father

## 2025-07-16 ENCOUNTER — HOSPITAL ENCOUNTER (OUTPATIENT)
Age: 35
Discharge: HOME | End: 2025-07-16
Payer: MEDICAID

## 2025-07-16 ENCOUNTER — HOSPITAL ENCOUNTER (OUTPATIENT)
Dept: GENERAL RADIOLOGY | Age: 35
Discharge: HOME | End: 2025-07-18
Payer: MEDICAID

## 2025-07-16 ENCOUNTER — OFFICE VISIT (OUTPATIENT)
Dept: NEUROSURGERY | Age: 35
End: 2025-07-16
Payer: MEDICAID

## 2025-07-16 VITALS
HEART RATE: 74 BPM | HEIGHT: 63 IN | BODY MASS INDEX: 46 KG/M2 | WEIGHT: 259.6 LBS | DIASTOLIC BLOOD PRESSURE: 88 MMHG | SYSTOLIC BLOOD PRESSURE: 106 MMHG

## 2025-07-16 DIAGNOSIS — M47.26 OTHER SPONDYLOSIS WITH RADICULOPATHY, LUMBAR REGION: ICD-10-CM

## 2025-07-16 DIAGNOSIS — Z96.89 SPINAL CORD STIMULATOR STATUS: Primary | ICD-10-CM

## 2025-07-16 DIAGNOSIS — Z96.89 SPINAL CORD STIMULATOR STATUS: ICD-10-CM

## 2025-07-16 PROCEDURE — 72070 X-RAY EXAM THORAC SPINE 2VWS: CPT

## 2025-07-16 PROCEDURE — 99213 OFFICE O/P EST LOW 20 MIN: CPT | Performed by: NEUROLOGICAL SURGERY

## 2025-07-16 NOTE — PROGRESS NOTES
[facility] [place] [id number] [facility] [address]  [id number] - GR[address] FLOOR, [address]  [facility] [address]  Dept: [phone]    Patient:  Maira Villalba  YOB: 1990  Date: 7/16/25    The patient is a 34 y.o. female who presents today for consult of the following problems:     Chief Complaint   Patient presents with    Back Pain     Patient states the pain is her entire back and the pain radiates down her legs.             HPI:     Maira Villalba is a 34 y.o. female on whom neurosurgical consultation was requested by Cintia Cleary MD for management of lumbar spondylosis with radiculopathy with predominantly axial pain.  Patient had a stimulator placed approximately 2-1/2 years ago.  Unfortunate she has had some diminishing pain control over time but still states that she gets 50% pain relief approximate 50% of the time or 3-1/2 days/week.  She does state that there is some improvement in terms of her overall activity and function during these time periods.  She had previously seen her nurse practitioner and had been contemplating explantation but has decided today that she would like to keep it in..      History:     Past Medical History:   Diagnosis Date    ADHD     Anxiety     Arthritis     Back pain     Bipolar 1 disorder, depressed (HCC)     Brain injury (HCC)     age 4-spinal meningitis    COVID-19 vaccine series not administered     DDD (degenerative disc disease), lumbar     Depression     Fibromyalgia     GERD (gastroesophageal reflux disease)     Hearing loss     Knee pain     Learning disability     Lumbar spondylosis     with radiculopathy    Migraine headache     MTHFR (methylene THF reductase) deficiency and homocystinuria     Obese     Prediabetes     Psoriasis     on Humira,  to use last dose on 12/22/22 per dermatologist for upcoming SCS surgery    RLS (restless legs syndrome)     Under care of service provider

## (undated) DEVICE — BATTERY PACK 2 FOR QUIKDRIVE: Brand: UNIVERSAL NEURO 2, QUIKDRIVE

## (undated) DEVICE — ADHESIVE SKIN CLOSURE TOP 36 CC HI VISC DERMBND MINI

## (undated) DEVICE — DRAPE,REIN 53X77,STERILE: Brand: MEDLINE

## (undated) DEVICE — SYRINGE MED 10ML TRNSLUC BRL PLUNG BLK MRK POLYPR CTRL

## (undated) DEVICE — GLOVE ORANGE PI 7   MSG9070

## (undated) DEVICE — SHEET, ORTHO, SPLIT, STERILE: Brand: MEDLINE

## (undated) DEVICE — ELECTRODE PT RET AD L9FT HI MOIST COND ADH HYDRGEL CORDED

## (undated) DEVICE — NEEDLE SPNL 18GA L3.5IN W/ QNCKE SHARPER BVL DURA CLICK

## (undated) DEVICE — EXTENSION SET IV 12 IN 0.45 CC INFUSION MINIBOR TBNG CLMP

## (undated) DEVICE — CHARGER 2500 KIT: Brand: OMNIA

## (undated) DEVICE — BLADE ES L4IN INSUL EDGE

## (undated) DEVICE — 1010 S-DRAPE TOWEL DRAPE 10/BX: Brand: STERI-DRAPE™

## (undated) DEVICE — COVER,MAYO STAND,STERILE: Brand: MEDLINE

## (undated) DEVICE — TOWEL,OR,DSP,ST,BLUE,DLX,XR,4/PK,20PK/CS: Brand: MEDLINE

## (undated) DEVICE — SUTURE PROL 5-0 L18IN NONABSORBABLE BLU RB-2 L13MM 1/2 CIR 8713H

## (undated) DEVICE — GOWN,SIRUS,NONRNF,SETINSLV,XL,20/CS: Brand: MEDLINE

## (undated) DEVICE — CONTAINER,SPECIMEN,4OZ,OR STRL: Brand: MEDLINE

## (undated) DEVICE — BLADE ES ELASTOMERIC COAT INSUL DURABLE BEND UPTO 90DEG

## (undated) DEVICE — 3.0MM PRECISION NEURO (MATCH HEAD)

## (undated) DEVICE — PAD,NON-ADHERENT,3X8,STERILE,LF,1/PK: Brand: MEDLINE

## (undated) DEVICE — TOWEL,OR,DSP,ST,NATURAL,DLX,4/PK,20PK/CS: Brand: MEDLINE

## (undated) DEVICE — SUTURE MCRYL SZ 3-0 L27IN ABSRB UD L24MM PS-1 3/8 CIR PRIM Y936H

## (undated) DEVICE — SVMMC PEDS/UROLOGY MINOR PACK: Brand: MEDLINE INDUSTRIES, INC.

## (undated) DEVICE — APPLICATOR MEDICATED 10.5 CC SOLUTION HI LT ORNG CHLORAPREP

## (undated) DEVICE — CONNECTOR TBNG WHT PLAS SUCT STR 5IN1 LTWT W/ M CONN

## (undated) DEVICE — SUTURE PERMAHAND SZ 3-0 L18IN NONABSORBABLE BLK L26MM SH C013D

## (undated) DEVICE — SYRINGE IRRIG 60ML SFT PLIABLE BLB EZ TO GRP 1 HND USE W/

## (undated) DEVICE — CODMAN® SURGICAL PATTIES 1/2" X 3" (1.27CM X 7.62CM): Brand: CODMAN®

## (undated) DEVICE — SPONGE,NEURO,0.5"X0.5",XR,STRL,10/PK: Brand: MEDLINE

## (undated) DEVICE — ELECTROSURGERY PENCIL ADAPTOR: Brand: PENADAPT

## (undated) DEVICE — SUTURE MCRYL SZ 4-0 L18IN ABSRB UD L16MM PC-3 3/8 CIR PRIM Y845G

## (undated) DEVICE — SOLUTION IV IRRIG POUR BRL 0.9% SODIUM CHL 2F7124

## (undated) DEVICE — YANKAUER,FLEXIBLE HANDLE,REGLR CAPACITY: Brand: MEDLINE INDUSTRIES, INC.

## (undated) DEVICE — GARMENT,MEDLINE,DVT,INT,CALF,MED, GEN2: Brand: MEDLINE

## (undated) DEVICE — GLOVE SURG SZ 75 CRM LTX FREE POLYISOPRENE POLYMER BEAD ANTI

## (undated) DEVICE — SUTURE VCRL SZ 0 L18IN ABSRB UD L36MM CT-1 1/2 CIR J840D

## (undated) DEVICE — SUTURE MCRYL + SZ 4 0 L18IN ABSRB UD PC 3 L16MM 3 8 CIR PRIM MCP845G

## (undated) DEVICE — APPLICATOR MEDICATED 26 CC SOLUTION HI LT ORNG CHLORAPREP

## (undated) DEVICE — AGENT HEMOSTATIC SURGIFLOW MATRIX KIT W/THROMBIN

## (undated) DEVICE — MARKER,SKIN,WI/RULER AND LABELS: Brand: MEDLINE

## (undated) DEVICE — PACK PROCEDURE SURG LUMBAR SPINE SVMMC

## (undated) DEVICE — NEEDLE SPNL L4.5IN OD22GA QNCKE TYP SPINOCAN

## (undated) DEVICE — PROTECTOR ULN NRV PUR FOAM HK LOOP STRP ANATOMICALLY

## (undated) DEVICE — NEEDLE HYPO 25GA L1.5IN BLU POLYPR HUB S STL REG BVL STR

## (undated) DEVICE — TUNNELING TOOL KIT, 35CM: Brand: NEVRO®

## (undated) DEVICE — SYRINGE,CONTROL,LL,FINGER,GRIP: Brand: MEDLINE INDUSTRIES, INC.

## (undated) DEVICE — 3M™ IOBAN™ 2 ANTIMICROBIAL INCISE DRAPE 6650EZ: Brand: IOBAN™ 2

## (undated) DEVICE — GLOVE SURG SZ 65 THK91MIL LTX FREE SYN POLYISOPRENE

## (undated) DEVICE — ADHESIVE SKIN CLSR 0.7ML TOP DERMBND ADV

## (undated) DEVICE — NEEDLE SPINAL 22GA L3.5IN SPINOCAN

## (undated) DEVICE — BLADE BLUNT 10MM W/TUBE SET NEXUS

## (undated) DEVICE — C-ARM: Brand: UNBRANDED

## (undated) DEVICE — STERILE POLYISOPRENE POWDER-FREE SURGICAL GLOVES WITH EMOLLIENT COATING: Brand: PROTEXIS

## (undated) DEVICE — SUTURE VCRL SZ 2-0 L18IN ABSRB UD CT-1 L36MM 1/2 CIR J839D

## (undated) DEVICE — Device

## (undated) DEVICE — 1000 S-DRAPE TOWEL DRAPE 10/BX: Brand: STERI-DRAPE™

## (undated) DEVICE — GAUZE,SPONGE,FLUFF,6"X6.75",STRL,5/TRAY: Brand: MEDLINE

## (undated) DEVICE — SUTURE VCRL SZ 3-0 L27IN ABSRB UD L26MM SH 1/2 CIR J416H

## (undated) DEVICE — INTENDED FOR TISSUE SEPARATION, AND OTHER PROCEDURES THAT REQUIRE A SHARP SURGICAL BLADE TO PUNCTURE OR CUT.: Brand: BARD-PARKER ® CARBON RIB-BACK BLADES

## (undated) DEVICE — SINGLE DOSE EPI TY

## (undated) DEVICE — SPONGE LAP W18XL18IN WHT COT 4 PLY FLD STRUNG RADPQ DISP ST

## (undated) DEVICE — PASSING ELEVATOR ACCESSORY TOOL: Brand: NEVRO